# Patient Record
Sex: FEMALE | Race: WHITE | NOT HISPANIC OR LATINO | URBAN - METROPOLITAN AREA
[De-identification: names, ages, dates, MRNs, and addresses within clinical notes are randomized per-mention and may not be internally consistent; named-entity substitution may affect disease eponyms.]

---

## 2019-05-11 ENCOUNTER — EMERGENCY (EMERGENCY)
Facility: HOSPITAL | Age: 84
LOS: 1 days | Discharge: HOME | End: 2019-05-11
Attending: EMERGENCY MEDICINE | Admitting: EMERGENCY MEDICINE
Payer: MEDICARE

## 2019-05-11 VITALS
TEMPERATURE: 98 F | OXYGEN SATURATION: 98 % | SYSTOLIC BLOOD PRESSURE: 126 MMHG | RESPIRATION RATE: 16 BRPM | DIASTOLIC BLOOD PRESSURE: 70 MMHG | HEART RATE: 78 BPM

## 2019-05-11 LAB
ALBUMIN SERPL ELPH-MCNC: 3.3 G/DL — SIGNIFICANT CHANGE UP (ref 3.3–5)
ALP SERPL-CCNC: 54 U/L — SIGNIFICANT CHANGE UP (ref 40–120)
ALT FLD-CCNC: 22 U/L — SIGNIFICANT CHANGE UP (ref 12–78)
ANION GAP SERPL CALC-SCNC: 7 MMOL/L — SIGNIFICANT CHANGE UP (ref 5–17)
APPEARANCE UR: ABNORMAL
APTT BLD: 26.1 SEC — LOW (ref 27.5–36.3)
AST SERPL-CCNC: 19 U/L — SIGNIFICANT CHANGE UP (ref 15–37)
BACTERIA # UR AUTO: ABNORMAL
BASOPHILS # BLD AUTO: 0.03 K/UL — SIGNIFICANT CHANGE UP (ref 0–0.2)
BASOPHILS NFR BLD AUTO: 0.2 % — SIGNIFICANT CHANGE UP (ref 0–2)
BILIRUB SERPL-MCNC: 0.1 MG/DL — LOW (ref 0.2–1.2)
BILIRUB UR-MCNC: NEGATIVE — SIGNIFICANT CHANGE UP
BUN SERPL-MCNC: 28 MG/DL — HIGH (ref 7–23)
CALCIUM SERPL-MCNC: 8.7 MG/DL — SIGNIFICANT CHANGE UP (ref 8.5–10.1)
CHLORIDE SERPL-SCNC: 107 MMOL/L — SIGNIFICANT CHANGE UP (ref 96–108)
CK SERPL-CCNC: 87 U/L — SIGNIFICANT CHANGE UP (ref 26–192)
CO2 SERPL-SCNC: 29 MMOL/L — SIGNIFICANT CHANGE UP (ref 22–31)
COLOR SPEC: YELLOW — SIGNIFICANT CHANGE UP
CREAT SERPL-MCNC: 1.1 MG/DL — SIGNIFICANT CHANGE UP (ref 0.5–1.3)
DIFF PNL FLD: ABNORMAL
EOSINOPHIL # BLD AUTO: 0.73 K/UL — HIGH (ref 0–0.5)
EOSINOPHIL NFR BLD AUTO: 4.5 % — SIGNIFICANT CHANGE UP (ref 0–6)
EPI CELLS # UR: ABNORMAL
GLUCOSE SERPL-MCNC: 122 MG/DL — HIGH (ref 70–99)
GLUCOSE UR QL: NEGATIVE — SIGNIFICANT CHANGE UP
HCT VFR BLD CALC: 35.1 % — SIGNIFICANT CHANGE UP (ref 34.5–45)
HGB BLD-MCNC: 11.4 G/DL — LOW (ref 11.5–15.5)
IMM GRANULOCYTES NFR BLD AUTO: 0.5 % — SIGNIFICANT CHANGE UP (ref 0–1.5)
INR BLD: 1.09 RATIO — SIGNIFICANT CHANGE UP (ref 0.88–1.16)
KETONES UR-MCNC: ABNORMAL
LEUKOCYTE ESTERASE UR-ACNC: NEGATIVE — SIGNIFICANT CHANGE UP
LYMPHOCYTES # BLD AUTO: 15.1 % — SIGNIFICANT CHANGE UP (ref 13–44)
LYMPHOCYTES # BLD AUTO: 2.43 K/UL — SIGNIFICANT CHANGE UP (ref 1–3.3)
MCHC RBC-ENTMCNC: 30 PG — SIGNIFICANT CHANGE UP (ref 27–34)
MCHC RBC-ENTMCNC: 32.5 GM/DL — SIGNIFICANT CHANGE UP (ref 32–36)
MCV RBC AUTO: 92.4 FL — SIGNIFICANT CHANGE UP (ref 80–100)
MONOCYTES # BLD AUTO: 1.03 K/UL — HIGH (ref 0–0.9)
MONOCYTES NFR BLD AUTO: 6.4 % — SIGNIFICANT CHANGE UP (ref 2–14)
NEUTROPHILS # BLD AUTO: 11.81 K/UL — HIGH (ref 1.8–7.4)
NEUTROPHILS NFR BLD AUTO: 73.3 % — SIGNIFICANT CHANGE UP (ref 43–77)
NITRITE UR-MCNC: NEGATIVE — SIGNIFICANT CHANGE UP
NRBC # BLD: 0 /100 WBCS — SIGNIFICANT CHANGE UP (ref 0–0)
PH UR: 5 — SIGNIFICANT CHANGE UP (ref 5–8)
PLATELET # BLD AUTO: 324 K/UL — SIGNIFICANT CHANGE UP (ref 150–400)
POTASSIUM SERPL-MCNC: 4 MMOL/L — SIGNIFICANT CHANGE UP (ref 3.5–5.3)
POTASSIUM SERPL-SCNC: 4 MMOL/L — SIGNIFICANT CHANGE UP (ref 3.5–5.3)
PROT SERPL-MCNC: 7.4 G/DL — SIGNIFICANT CHANGE UP (ref 6–8.3)
PROT UR-MCNC: 150 MG/DL
PROTHROM AB SERPL-ACNC: 12.4 SEC — SIGNIFICANT CHANGE UP (ref 10–12.9)
RBC # BLD: 3.8 M/UL — SIGNIFICANT CHANGE UP (ref 3.8–5.2)
RBC # FLD: 14.2 % — SIGNIFICANT CHANGE UP (ref 10.3–14.5)
RBC CASTS # UR COMP ASSIST: ABNORMAL /HPF (ref 0–4)
SODIUM SERPL-SCNC: 143 MMOL/L — SIGNIFICANT CHANGE UP (ref 135–145)
SP GR SPEC: 1.02 — SIGNIFICANT CHANGE UP (ref 1.01–1.02)
UROBILINOGEN FLD QL: NEGATIVE — SIGNIFICANT CHANGE UP
WBC # BLD: 16.11 K/UL — HIGH (ref 3.8–10.5)
WBC # FLD AUTO: 16.11 K/UL — HIGH (ref 3.8–10.5)
WBC UR QL: NEGATIVE — SIGNIFICANT CHANGE UP

## 2019-05-11 PROCEDURE — 85610 PROTHROMBIN TIME: CPT

## 2019-05-11 PROCEDURE — 73030 X-RAY EXAM OF SHOULDER: CPT | Mod: 26,RT

## 2019-05-11 PROCEDURE — 99284 EMERGENCY DEPT VISIT MOD MDM: CPT

## 2019-05-11 PROCEDURE — 99284 EMERGENCY DEPT VISIT MOD MDM: CPT | Mod: 25

## 2019-05-11 PROCEDURE — 70450 CT HEAD/BRAIN W/O DYE: CPT

## 2019-05-11 PROCEDURE — 73562 X-RAY EXAM OF KNEE 3: CPT

## 2019-05-11 PROCEDURE — 72125 CT NECK SPINE W/O DYE: CPT

## 2019-05-11 PROCEDURE — 85730 THROMBOPLASTIN TIME PARTIAL: CPT

## 2019-05-11 PROCEDURE — 72125 CT NECK SPINE W/O DYE: CPT | Mod: 26

## 2019-05-11 PROCEDURE — 73030 X-RAY EXAM OF SHOULDER: CPT

## 2019-05-11 PROCEDURE — 70450 CT HEAD/BRAIN W/O DYE: CPT | Mod: 26

## 2019-05-11 PROCEDURE — 82550 ASSAY OF CK (CPK): CPT

## 2019-05-11 PROCEDURE — 71045 X-RAY EXAM CHEST 1 VIEW: CPT

## 2019-05-11 PROCEDURE — 85027 COMPLETE CBC AUTOMATED: CPT

## 2019-05-11 PROCEDURE — 71045 X-RAY EXAM CHEST 1 VIEW: CPT | Mod: 26

## 2019-05-11 PROCEDURE — 87086 URINE CULTURE/COLONY COUNT: CPT

## 2019-05-11 PROCEDURE — 80053 COMPREHEN METABOLIC PANEL: CPT

## 2019-05-11 PROCEDURE — 73502 X-RAY EXAM HIP UNI 2-3 VIEWS: CPT

## 2019-05-11 PROCEDURE — 36415 COLL VENOUS BLD VENIPUNCTURE: CPT

## 2019-05-11 PROCEDURE — 90714 TD VACC NO PRESV 7 YRS+ IM: CPT

## 2019-05-11 PROCEDURE — 73562 X-RAY EXAM OF KNEE 3: CPT | Mod: 26,RT

## 2019-05-11 PROCEDURE — 81001 URINALYSIS AUTO W/SCOPE: CPT

## 2019-05-11 PROCEDURE — 73502 X-RAY EXAM HIP UNI 2-3 VIEWS: CPT | Mod: 26,RT

## 2019-05-11 RX ORDER — SODIUM CHLORIDE 9 MG/ML
3 INJECTION INTRAMUSCULAR; INTRAVENOUS; SUBCUTANEOUS ONCE
Refills: 0 | Status: COMPLETED | OUTPATIENT
Start: 2019-05-11 | End: 2019-05-11

## 2019-05-11 RX ORDER — TETANUS AND DIPHTHERIA TOXOIDS ADSORBED 2; 2 [LF]/.5ML; [LF]/.5ML
0.5 INJECTION INTRAMUSCULAR ONCE
Refills: 0 | Status: COMPLETED | OUTPATIENT
Start: 2019-05-11 | End: 2019-05-11

## 2019-05-11 RX ADMIN — TETANUS AND DIPHTHERIA TOXOIDS ADSORBED 0.5 MILLILITER(S): 2; 2 INJECTION INTRAMUSCULAR at 20:28

## 2019-05-11 RX ADMIN — SODIUM CHLORIDE 3 MILLILITER(S): 9 INJECTION INTRAMUSCULAR; INTRAVENOUS; SUBCUTANEOUS at 19:27

## 2019-05-11 NOTE — ED PROVIDER NOTE - ENMT, MLM
Airway patent, Nasal mucosa clear. Mouth with normal mucosa. Throat has no vesicles, no oropharyngeal exudates and uvula is midline. R parietal contusion with superficial skin tear, no open laceration. NO active bleeding at this time.

## 2019-05-11 NOTE — ED ADULT TRIAGE NOTE - TEMPERATURE IN CELSIUS (DEGREES C)
36.4 Have Your Skin Lesions Been Treated?: not been treated Is This A New Presentation, Or A Follow-Up?: Follow Up Skin Lesions How Severe Is Your Skin Lesion?: moderate

## 2019-05-11 NOTE — ED ADULT NURSE NOTE - NS ED NURSE LEVEL OF CONSCIOUSNESS ORIENTATION
pt with fever since last night. tylenol 4am. tmax 103. denies cold symptoms. pt crying throughout vitals
Oriented - self; Oriented - place; Oriented - time

## 2019-05-11 NOTE — ED PROVIDER NOTE - CARE PROVIDER_API CALL
Jordy Washington)  Internal Medicine  [address] [place] [id number]  Phone: [phone]  Fax: [phone]  Follow Up Time:

## 2019-05-11 NOTE — ED PROVIDER NOTE - PROGRESS NOTE DETAILS
Pt doing well, is at basline. No acute findings. no acute signs of infxn, noted elev WBC. Will sent back to SNF. Pt doing well, is at basline. No acute findings. no acute signs of infxn, noted elev WBC. Will sent back to SNF. Pt able to walk in ed at her usual baseline

## 2019-05-11 NOTE — ED ADULT NURSE NOTE - PMH
Anemia    Cognitive deficits    Constipation    Depression    HLD (hyperlipidemia)    HTN (hypertension)    TIA (transient ischemic attack)    Weakness

## 2019-05-11 NOTE — ED PROVIDER NOTE - MUSCULOSKELETAL, MLM
Spine appears normal, no spinal tend (c,t,l). range of motion is not limited, no muscle or joint tenderness

## 2019-05-11 NOTE — ED PROVIDER NOTE - OBJECTIVE STATEMENT
89 yo F p/w was walking in bathroom and lost her footing. Pt fell and hit head. no loc. Pt co small lac to scalp, mild R shoulder pain and mild R hip pain. Pt was on ground for ~ 4 hrs prior to EMS arrival. No cp/sob/palp. no weakness. no dizzyness. NO neck / back pain. NO recent illness. no agg/allev factors. NO other inj or co.

## 2019-05-11 NOTE — ED PROVIDER NOTE - CARE PLAN
Principal Discharge DX:	Fall, initial encounter  Secondary Diagnosis:	Head injuries, initial encounter  Secondary Diagnosis:	Abrasion

## 2019-05-11 NOTE — ED ADULT NURSE NOTE - CHIEF COMPLAINT QUOTE
pt s/p fall, head injury, unwitnessed, pt poor historian, pr on floor for approx 4 hours, hs of dementia, c/o mild dizziness

## 2019-05-11 NOTE — ED ADULT NURSE NOTE - OBJECTIVE STATEMENT
88 yo F p/w was walking in bathroom and lost her footing. Pt states she hit her head on the floor has small lac to scalp, R shoulder pain and R hip pain pt states she was on the floor for appropx 3 hours pt has no other complaints

## 2019-05-11 NOTE — ED ADULT NURSE REASSESSMENT NOTE - NS ED NURSE REASSESS COMMENT FT1
Received patient awake and alert from previous shift RN, Introduced self to patient. Patient presenting with S/P fall, currently in CT scan. Pending Tetanus at this time. Blood work sent by previous RN, safety and comfort maintained, will continue to monitor.

## 2019-05-11 NOTE — ED PROVIDER NOTE - NSFOLLOWUPINSTRUCTIONS_ED_ALL_ED_FT
1) Follow-up with your Primary Medical Doctor. Call today / next business day for prompt follow-up.  2) Return to Emergency room for any worsening or persistent pain, weakness, fever, or any other concerning symptoms.  3) See attached instruction sheets for additional information, including information regarding signs and symptoms to look out for, reasons to seek immediate care and other important instructions.  4) Tylenol as needed  5) Bacitracin to scalp wound twice daily

## 2019-05-12 VITALS
DIASTOLIC BLOOD PRESSURE: 85 MMHG | TEMPERATURE: 98 F | OXYGEN SATURATION: 95 % | HEART RATE: 69 BPM | RESPIRATION RATE: 16 BRPM | SYSTOLIC BLOOD PRESSURE: 145 MMHG

## 2019-05-13 LAB
CULTURE RESULTS: SIGNIFICANT CHANGE UP
SPECIMEN SOURCE: SIGNIFICANT CHANGE UP

## 2020-11-27 ENCOUNTER — INPATIENT (INPATIENT)
Facility: HOSPITAL | Age: 85
LOS: 4 days | Discharge: NURSING, REHAB OR LONG-TERM CARE | DRG: 689 | End: 2020-12-02
Attending: INTERNAL MEDICINE | Admitting: INTERNAL MEDICINE
Payer: MEDICARE

## 2020-11-27 VITALS
HEIGHT: 55 IN | TEMPERATURE: 98 F | HEART RATE: 77 BPM | WEIGHT: 110.01 LBS | SYSTOLIC BLOOD PRESSURE: 161 MMHG | DIASTOLIC BLOOD PRESSURE: 81 MMHG | OXYGEN SATURATION: 98 % | RESPIRATION RATE: 16 BRPM

## 2020-11-27 DIAGNOSIS — R53.1 WEAKNESS: ICD-10-CM

## 2020-11-27 DIAGNOSIS — N39.0 URINARY TRACT INFECTION, SITE NOT SPECIFIED: ICD-10-CM

## 2020-11-27 DIAGNOSIS — R93.0 ABNORMAL FINDINGS ON DIAGNOSTIC IMAGING OF SKULL AND HEAD, NOT ELSEWHERE CLASSIFIED: ICD-10-CM

## 2020-11-27 DIAGNOSIS — R41.82 ALTERED MENTAL STATUS, UNSPECIFIED: ICD-10-CM

## 2020-11-27 DIAGNOSIS — K59.00 CONSTIPATION, UNSPECIFIED: ICD-10-CM

## 2020-11-27 DIAGNOSIS — F32.9 MAJOR DEPRESSIVE DISORDER, SINGLE EPISODE, UNSPECIFIED: ICD-10-CM

## 2020-11-27 DIAGNOSIS — Z29.9 ENCOUNTER FOR PROPHYLACTIC MEASURES, UNSPECIFIED: ICD-10-CM

## 2020-11-27 DIAGNOSIS — I10 ESSENTIAL (PRIMARY) HYPERTENSION: ICD-10-CM

## 2020-11-27 PROBLEM — D64.9 ANEMIA, UNSPECIFIED: Chronic | Status: ACTIVE | Noted: 2019-05-11

## 2020-11-27 PROBLEM — R41.89 OTHER SYMPTOMS AND SIGNS INVOLVING COGNITIVE FUNCTIONS AND AWARENESS: Chronic | Status: ACTIVE | Noted: 2019-05-11

## 2020-11-27 PROBLEM — G45.9 TRANSIENT CEREBRAL ISCHEMIC ATTACK, UNSPECIFIED: Chronic | Status: ACTIVE | Noted: 2019-05-11

## 2020-11-27 PROBLEM — E78.5 HYPERLIPIDEMIA, UNSPECIFIED: Chronic | Status: ACTIVE | Noted: 2019-05-11

## 2020-11-27 LAB
ALBUMIN SERPL ELPH-MCNC: 3.2 G/DL — LOW (ref 3.3–5)
ALP SERPL-CCNC: 66 U/L — SIGNIFICANT CHANGE UP (ref 40–120)
ALT FLD-CCNC: 19 U/L — SIGNIFICANT CHANGE UP (ref 12–78)
ANION GAP SERPL CALC-SCNC: 8 MMOL/L — SIGNIFICANT CHANGE UP (ref 5–17)
APPEARANCE UR: ABNORMAL
APTT BLD: 32.2 SEC — SIGNIFICANT CHANGE UP (ref 27.5–35.5)
AST SERPL-CCNC: 18 U/L — SIGNIFICANT CHANGE UP (ref 15–37)
BASOPHILS # BLD AUTO: 0.02 K/UL — SIGNIFICANT CHANGE UP (ref 0–0.2)
BASOPHILS NFR BLD AUTO: 0.2 % — SIGNIFICANT CHANGE UP (ref 0–2)
BILIRUB SERPL-MCNC: 0.4 MG/DL — SIGNIFICANT CHANGE UP (ref 0.2–1.2)
BILIRUB UR-MCNC: NEGATIVE — SIGNIFICANT CHANGE UP
BUN SERPL-MCNC: 17 MG/DL — SIGNIFICANT CHANGE UP (ref 7–23)
CALCIUM SERPL-MCNC: 8.8 MG/DL — SIGNIFICANT CHANGE UP (ref 8.5–10.1)
CHLORIDE SERPL-SCNC: 106 MMOL/L — SIGNIFICANT CHANGE UP (ref 96–108)
CO2 SERPL-SCNC: 28 MMOL/L — SIGNIFICANT CHANGE UP (ref 22–31)
COLOR SPEC: YELLOW — SIGNIFICANT CHANGE UP
CREAT SERPL-MCNC: 1 MG/DL — SIGNIFICANT CHANGE UP (ref 0.5–1.3)
DIFF PNL FLD: ABNORMAL
EOSINOPHIL # BLD AUTO: 0.36 K/UL — SIGNIFICANT CHANGE UP (ref 0–0.5)
EOSINOPHIL NFR BLD AUTO: 2.8 % — SIGNIFICANT CHANGE UP (ref 0–6)
GLUCOSE SERPL-MCNC: 97 MG/DL — SIGNIFICANT CHANGE UP (ref 70–99)
GLUCOSE UR QL: NEGATIVE — SIGNIFICANT CHANGE UP
HCT VFR BLD CALC: 40.6 % — SIGNIFICANT CHANGE UP (ref 34.5–45)
HGB BLD-MCNC: 13.7 G/DL — SIGNIFICANT CHANGE UP (ref 11.5–15.5)
IMM GRANULOCYTES NFR BLD AUTO: 0.5 % — SIGNIFICANT CHANGE UP (ref 0–1.5)
INR BLD: 1.14 RATIO — SIGNIFICANT CHANGE UP (ref 0.88–1.16)
KETONES UR-MCNC: NEGATIVE — SIGNIFICANT CHANGE UP
LACTATE SERPL-SCNC: 1.1 MMOL/L — SIGNIFICANT CHANGE UP (ref 0.7–2)
LEUKOCYTE ESTERASE UR-ACNC: ABNORMAL
LYMPHOCYTES # BLD AUTO: 1.69 K/UL — SIGNIFICANT CHANGE UP (ref 1–3.3)
LYMPHOCYTES # BLD AUTO: 13 % — SIGNIFICANT CHANGE UP (ref 13–44)
MCHC RBC-ENTMCNC: 30.6 PG — SIGNIFICANT CHANGE UP (ref 27–34)
MCHC RBC-ENTMCNC: 33.7 GM/DL — SIGNIFICANT CHANGE UP (ref 32–36)
MCV RBC AUTO: 90.6 FL — SIGNIFICANT CHANGE UP (ref 80–100)
MONOCYTES # BLD AUTO: 0.64 K/UL — SIGNIFICANT CHANGE UP (ref 0–0.9)
MONOCYTES NFR BLD AUTO: 4.9 % — SIGNIFICANT CHANGE UP (ref 2–14)
NEUTROPHILS # BLD AUTO: 10.24 K/UL — HIGH (ref 1.8–7.4)
NEUTROPHILS NFR BLD AUTO: 78.6 % — HIGH (ref 43–77)
NITRITE UR-MCNC: POSITIVE
NRBC # BLD: 0 /100 WBCS — SIGNIFICANT CHANGE UP (ref 0–0)
PH UR: 6 — SIGNIFICANT CHANGE UP (ref 5–8)
PLATELET # BLD AUTO: 365 K/UL — SIGNIFICANT CHANGE UP (ref 150–400)
POTASSIUM SERPL-MCNC: 3.8 MMOL/L — SIGNIFICANT CHANGE UP (ref 3.5–5.3)
POTASSIUM SERPL-SCNC: 3.8 MMOL/L — SIGNIFICANT CHANGE UP (ref 3.5–5.3)
PROT SERPL-MCNC: 8 G/DL — SIGNIFICANT CHANGE UP (ref 6–8.3)
PROT UR-MCNC: 15
PROTHROM AB SERPL-ACNC: 13.3 SEC — SIGNIFICANT CHANGE UP (ref 10.6–13.6)
RBC # BLD: 4.48 M/UL — SIGNIFICANT CHANGE UP (ref 3.8–5.2)
RBC # FLD: 13.5 % — SIGNIFICANT CHANGE UP (ref 10.3–14.5)
SARS-COV-2 RNA SPEC QL NAA+PROBE: SIGNIFICANT CHANGE UP
SODIUM SERPL-SCNC: 142 MMOL/L — SIGNIFICANT CHANGE UP (ref 135–145)
SP GR SPEC: 1.01 — SIGNIFICANT CHANGE UP (ref 1.01–1.02)
TROPONIN I SERPL-MCNC: <.015 NG/ML — SIGNIFICANT CHANGE UP (ref 0.01–0.04)
UROBILINOGEN FLD QL: NEGATIVE — SIGNIFICANT CHANGE UP
WBC # BLD: 13.02 K/UL — HIGH (ref 3.8–10.5)
WBC # FLD AUTO: 13.02 K/UL — HIGH (ref 3.8–10.5)

## 2020-11-27 PROCEDURE — 99284 EMERGENCY DEPT VISIT MOD MDM: CPT

## 2020-11-27 PROCEDURE — 71045 X-RAY EXAM CHEST 1 VIEW: CPT | Mod: 26

## 2020-11-27 PROCEDURE — 70450 CT HEAD/BRAIN W/O DYE: CPT | Mod: 26

## 2020-11-27 RX ORDER — HEPARIN SODIUM 5000 [USP'U]/ML
5000 INJECTION INTRAVENOUS; SUBCUTANEOUS EVERY 12 HOURS
Refills: 0 | Status: DISCONTINUED | OUTPATIENT
Start: 2020-11-27 | End: 2020-12-02

## 2020-11-27 RX ORDER — ASPIRIN/CALCIUM CARB/MAGNESIUM 324 MG
81 TABLET ORAL DAILY
Refills: 0 | Status: DISCONTINUED | OUTPATIENT
Start: 2020-11-27 | End: 2020-12-02

## 2020-11-27 RX ORDER — AMLODIPINE BESYLATE 2.5 MG/1
5 TABLET ORAL DAILY
Refills: 0 | Status: DISCONTINUED | OUTPATIENT
Start: 2020-11-27 | End: 2020-12-02

## 2020-11-27 RX ORDER — ACETAMINOPHEN 500 MG
650 TABLET ORAL EVERY 6 HOURS
Refills: 0 | Status: DISCONTINUED | OUTPATIENT
Start: 2020-11-27 | End: 2020-12-02

## 2020-11-27 RX ORDER — LANOLIN/MINERAL OIL
1 LOTION (ML) TOPICAL
Qty: 0 | Refills: 0 | DISCHARGE

## 2020-11-27 RX ORDER — LANOLIN ALCOHOL/MO/W.PET/CERES
3 CREAM (GRAM) TOPICAL AT BEDTIME
Refills: 0 | Status: DISCONTINUED | OUTPATIENT
Start: 2020-11-27 | End: 2020-12-02

## 2020-11-27 RX ORDER — PANTOPRAZOLE SODIUM 20 MG/1
40 TABLET, DELAYED RELEASE ORAL
Refills: 0 | Status: DISCONTINUED | OUTPATIENT
Start: 2020-11-27 | End: 2020-12-02

## 2020-11-27 RX ORDER — FERROUS SULFATE 325(65) MG
1 TABLET ORAL
Qty: 0 | Refills: 0 | DISCHARGE

## 2020-11-27 RX ORDER — CEFTRIAXONE 500 MG/1
1000 INJECTION, POWDER, FOR SOLUTION INTRAMUSCULAR; INTRAVENOUS EVERY 24 HOURS
Refills: 0 | Status: DISCONTINUED | OUTPATIENT
Start: 2020-11-28 | End: 2020-11-29

## 2020-11-27 RX ORDER — SODIUM CHLORIDE 9 MG/ML
1000 INJECTION INTRAMUSCULAR; INTRAVENOUS; SUBCUTANEOUS ONCE
Refills: 0 | Status: COMPLETED | OUTPATIENT
Start: 2020-11-27 | End: 2020-11-27

## 2020-11-27 RX ORDER — CEFTRIAXONE 500 MG/1
1000 INJECTION, POWDER, FOR SOLUTION INTRAMUSCULAR; INTRAVENOUS ONCE
Refills: 0 | Status: COMPLETED | OUTPATIENT
Start: 2020-11-27 | End: 2020-11-27

## 2020-11-27 RX ORDER — ALBUTEROL 90 UG/1
2 AEROSOL, METERED ORAL
Qty: 0 | Refills: 0 | DISCHARGE

## 2020-11-27 RX ORDER — CHOLECALCIFEROL (VITAMIN D3) 125 MCG
0 CAPSULE ORAL
Qty: 0 | Refills: 0 | DISCHARGE

## 2020-11-27 RX ORDER — HYDRALAZINE HCL 50 MG
10 TABLET ORAL
Refills: 0 | Status: DISCONTINUED | OUTPATIENT
Start: 2020-11-27 | End: 2020-12-02

## 2020-11-27 RX ORDER — MAGNESIUM HYDROXIDE 400 MG/1
30 TABLET, CHEWABLE ORAL DAILY
Refills: 0 | Status: DISCONTINUED | OUTPATIENT
Start: 2020-11-27 | End: 2020-12-02

## 2020-11-27 RX ORDER — ESCITALOPRAM OXALATE 10 MG/1
10 TABLET, FILM COATED ORAL DAILY
Refills: 0 | Status: DISCONTINUED | OUTPATIENT
Start: 2020-11-27 | End: 2020-12-02

## 2020-11-27 RX ORDER — SODIUM CHLORIDE 9 MG/ML
1000 INJECTION, SOLUTION INTRAVENOUS
Refills: 0 | Status: DISCONTINUED | OUTPATIENT
Start: 2020-11-27 | End: 2020-12-01

## 2020-11-27 RX ORDER — ACETAMINOPHEN 500 MG
2 TABLET ORAL
Qty: 0 | Refills: 0 | DISCHARGE

## 2020-11-27 RX ORDER — POLYETHYLENE GLYCOL 3350 17 G/17G
17 POWDER, FOR SOLUTION ORAL DAILY
Refills: 0 | Status: DISCONTINUED | OUTPATIENT
Start: 2020-11-27 | End: 2020-12-02

## 2020-11-27 RX ORDER — CHOLECALCIFEROL (VITAMIN D3) 125 MCG
1000 CAPSULE ORAL DAILY
Refills: 0 | Status: DISCONTINUED | OUTPATIENT
Start: 2020-11-27 | End: 2020-12-02

## 2020-11-27 RX ADMIN — Medication 10 MILLIGRAM(S): at 18:33

## 2020-11-27 RX ADMIN — CEFTRIAXONE 100 MILLIGRAM(S): 500 INJECTION, POWDER, FOR SOLUTION INTRAMUSCULAR; INTRAVENOUS at 13:48

## 2020-11-27 RX ADMIN — SODIUM CHLORIDE 50 MILLILITER(S): 9 INJECTION, SOLUTION INTRAVENOUS at 22:38

## 2020-11-27 RX ADMIN — SODIUM CHLORIDE 1000 MILLILITER(S): 9 INJECTION INTRAMUSCULAR; INTRAVENOUS; SUBCUTANEOUS at 12:45

## 2020-11-27 NOTE — ED ADULT TRIAGE NOTE - CHIEF COMPLAINT QUOTE
Pt BIBA from [facility] for possible CVA- staff from NH last known well 800- staff said that new onset of right sided weakness -state pt is normally talkative -now making grunts- EMS state patient was speaking on ambulance - pt is speaking here -

## 2020-11-27 NOTE — H&P ADULT - NSHPLABSRESULTS_GEN_ALL_CORE
< from: Xray Chest 1 View-PORTABLE IMMEDIATE (Xray Chest 1 View-PORTABLE IMMEDIATE .) (11.27.20 @ 13:07) >    INTERPRETATION:  DATE OF STUDY: 11/27/2020    PRIOR: 5/11/19    CLINICAL INDICATION: AMS. Lethargy. Assess for chest process.    TECHNIQUE: portable chest.    FINDINGS:  Thoracic aortic atheromatous changes and ectasia are stable.  The cardiomediastinal silhouette is within normal limits.  Pulmonary vascularity appears normal.  No focal consolidations. No pleural effusion or pneumothorax.  No acutebony finding.  Redemonstration of right breast calcifications.    IMPRESSION:  1. Stable chest.  2. Negative for active pulmonary disease.    < end of copied text >    < from: CT Head No Cont (11.27.20 @ 15:29) >    EXAM:  CT BRAIN                            PROCEDURE DATE:  11/27/2020          INTERPRETATION:  CLINICAL INDICATION: Mental status change    Technique: Noncontrast CT of the head was performed.    Multiple contiguous axial images were acquired from the skull base to the vertex without the administration of intravenous contrast. Coronal and sagittal reformations were made.    COMPARISON: Head CT, 5/11/2019    FINDINGS:  Redemonstration moderate enlargement of ventricles and sulci consistent with volume loss. Is demonstration of nonspecific white matter decreased attenuation, likely microvascular disease with age indeterminate lacunar infarction at the left ventral medial thalamus new from prior (2:14). There is atherosclerotic vascular calcification along the carotid siphons. There is slight motion, as well as streak artifact with artificial increased attenuation along the right lateral frontal lobe. There is no new intraparenchymal hematoma, mass effect or midline shift. No new abnormal extra-axial fluid collections are present. The basal cisterns remain patent.    Intact calvarium, absent lenses with cataract surgery, interval new right anterior ostiomeatal complex and right frontal recess obstruction with new right maxillary, right frontal and right anterior ethmoidal air cell complete opacification and mucoceles with bony hyperostosis, mass such as polyp or inverting papilloma not excluded, new mucosal thickening along the right posterior ethmoid sinus left frontal and sphenoid sinuses. Suggest dedicated ENT assessment. Mastoid cavities remain unremarkable. Calcific densities  left frontal and right parietal scalp likely represent calcified sebaceous cysts.    IMPRESSION:    Volume loss, microvascular disease, age indeterminate lacunar infarct including  left ventral medial thalamus, MR sensitive for new ischemia. No acute hemorrhage or midline shift. Right ostiomeatal complex obstruction with right frontal, ethmoid and maxillary mucoceles right maxillary bony hyperostosis,, mass effect as polyp or inverting papilloma not excluded, suggest dedicated ENT assessment. If symptoms persist consider follow-up head CT or MR if no contraindications.    < end of copied text >

## 2020-11-27 NOTE — H&P ADULT - PROBLEM SELECTOR PLAN 1
LIKELY 2/2 TO ACUTE METABOLIC ENCEPHALOPATHY SECONDARY TO UTI ,POA .PROGRESSION /ONSET OF DEMENTIA MAY BE CONTRIBUTING FACTOR .NEUROLOGY CONS REQUESTED TO RULE OUT ACUTE CVA

## 2020-11-27 NOTE — H&P ADULT - PROBLEM SELECTOR PLAN 4
REPORT REVIEWED WITH  ,ISCHEMIC LACUNAR CHANGES ARE OF INDETERMINATE TIME OF ORIGIN .MAY REQUIRE MRI .CONTINUE BASA FOR NOW DAILY

## 2020-11-27 NOTE — H&P ADULT - HISTORY OF PRESENT ILLNESS
89 YO F ,[facility] resident was brought to ER with altered mental status and weakness ,found to have probable UTI Admitted for septic workup and evaluation,send blood and urine cx,serial lactate levels,monitor vitals closley,ivfs hydration,monitor urine output and renal profile,iv abx initiated -started on ceftriaxone in ER and ID CONS requested .CT Brain was done and showed Volume loss, microvascular disease, age indeterminate lacunar infarct including  left ventral medial thalamus, MR sensitive for new ischemia. No acute hemorrhage or midline shift. Right ostiomeatal complex obstruction with right frontal, ethmoid and maxillary mucoceles right maxillary bony hyperostosis,, mass effect as polyp or inverting papilloma not excluded,report was discussed with neurologist and no new interventions recommended ,continue home rx .Patient;s past med hx is significant for Anemia  ,Cognitive deficits  ,constipation ,Depression  ,HLD (hyperlipidemia)  ,HTN (hypertension)  ,TIA (transient ischemic attack)  .Found to have BP elevation and cardiology consult requested for comanagement Admitted  to telemetry unit for monitoring , send 3 sets of cardiac enzymes to rule out acute coronary event, obtain ECHO to evaluate LVEF, cardiology consult  ,continue current management, O2 supply, anticoagulation plan as per cardiology consult COVID PCR sent and currently is pending . Palliative care consult requested ,to discuss advance directives and complete MOLST

## 2020-11-27 NOTE — ED PROVIDER NOTE - ATTENDING CONTRIBUTION TO CARE
I have personally performed a face to face diagnostic evaluation on this patient.  I have reviewed the PA note and agree with the history, exam, and plan of care, except as noted.  History and Exam by me shows  bibems from [facility] for ams today c ? right-sided weakness. pt is in nad.  pt is responsive to verbal stimuli.  labs were significant for uti.  Admitted.

## 2020-11-27 NOTE — H&P ADULT - ATTENDING COMMENTS
89 YO F ,[facility] resident was brought to ER with altered mental status and weakness ,found to have probable UTI Admitted for septic workup and evaluation,send blood and urine cx,serial lactate levels,monitor vitals closley,ivfs hydration,monitor urine output and renal profile,iv abx initiated -started on ceftriaxone in ER and ID CONS requested .CT Brain was done and showed Volume loss, microvascular disease, age indeterminate lacunar infarct including  left ventral medial thalamus, MR sensitive for new ischemia. No acute hemorrhage or midline shift. Right ostiomeatal complex obstruction with right frontal, ethmoid and maxillary mucoceles right maxillary bony hyperostosis,, mass effect as polyp or inverting papilloma not excluded,report was discussed with neurologist and no new interventions recommended ,continue home rx .Patient;s past med hx is significant for Anemia  ,Cognitive deficits  ,constipation ,Depression  ,HLD (hyperlipidemia)  ,HTN (hypertension)  ,TIA (transient ischemic attack)  .Found to have BP elevation and cardiology consult requested for comanagement Admitted  to telemetry unit for monitoring , send 3 sets of cardiac enzymes to rule out acute coronary event, obtain ECHO to evaluate LVEF, cardiology consult  ,continue current management, O2 supply, anticoagulation plan as per cardiology consult Palliative care consult requested ,to discuss advance directives and complete MOLST

## 2020-11-27 NOTE — ED PROVIDER NOTE - CLINICAL SUMMARY MEDICAL DECISION MAKING FREE TEXT BOX
89 yo F p/w lethargy x today, decreased responsiveness as per [facility] staff--> will do full workup, CVA vs infectious etiology

## 2020-11-27 NOTE — ED ADULT NURSE NOTE - OBJECTIVE STATEMENT
patient came in ED from [facility]. as per EMS, patient noted to be less responsive since 08:00am. patient noted to be sleepy, responds with open eyes and verbally when approached. non-labored respiration noted. abdomen nondistended, nontender. afebrile. on fall precaution.

## 2020-11-27 NOTE — CONSULT NOTE ADULT - ASSESSMENT
ams  uti- on rocephin  hypertension - on norvasc and hydralazine  dyslipidemia  echo for lv function

## 2020-11-27 NOTE — ED PROVIDER NOTE - OBJECTIVE STATEMENT
89 yo F PMHx HTN, HLD, TIA, cognitive deficits presents to ED from [place] for evaluation of lethargy x today, decreased responsiveness and reported right sided weakness. Last known well 8 AM as per NH staff. Upon evaluation, pt does not provide any history. Pt is arouseable to verbal stimuli but replies with grunting or "yes" and "no" -- No further hx attainable at this time.

## 2020-11-27 NOTE — CONSULT NOTE ADULT - SUBJECTIVE AND OBJECTIVE BOX
CARDIOLOGY CONSULT NOTE    Patient is a 89y Female with a known history of : admitted with ams and uti  has had hypertension and dyslipidemia  Prophylactic measure [Z29.9]    Constipation [K59.00]    Weakness [R53.1]    Depression [F32.9]    Abnormal CT scan of head [R93.0]    HTN (hypertension) [I10]    UTI (urinary tract infection) [N39.0]    Altered mental status [R41.82]      HPI:  89 YO F ,[facility] resident was brought to ER with altered mental status and weakness ,found to have probable UTI Admitted for septic workup and evaluation,send blood and urine cx,serial lactate levels,monitor vitals closley,ivfs hydration,monitor urine output and renal profile,iv abx initiated -started on ceftriaxone in ER and ID CONS requested .CT Brain was done and showed Volume loss, microvascular disease, age indeterminate lacunar infarct including  left ventral medial thalamus, MR sensitive for new ischemia. No acute hemorrhage or midline shift. Right ostiomeatal complex obstruction with right frontal, ethmoid and maxillary mucoceles right maxillary bony hyperostosis,, mass effect as polyp or inverting papilloma not excluded,report was discussed with neurologist and no new interventions recommended ,continue home rx .Patient;s past med hx is significant for Anemia  ,Cognitive deficits  ,constipation ,Depression  ,HLD (hyperlipidemia)  ,HTN (hypertension)  ,TIA (transient ischemic attack)  .Found to have BP elevation and cardiology consult requested for comanagement Admitted  to telemetry unit for monitoring , send 3 sets of cardiac enzymes to rule out acute coronary event, obtain ECHO to evaluate LVEF, cardiology consult  ,continue current management, O2 supply, anticoagulation plan as per cardiology consult COVID PCR sent and currently is pending . Palliative care consult requested ,to discuss advance directives and complete MOLST  (27 Nov 2020 18:15)      REVIEW OF SYSTEMS:    CONSTITUTIONAL: No fever, weight loss, or fatigue  EYES: No eye pain, visual disturbances, or discharge  ENMT:  No difficulty hearing, tinnitus, vertigo; No sinus or throat pain  NECK: No pain or stiffness  BREASTS: No pain, masses, or nipple discharge  RESPIRATORY: No cough, wheezing, chills or hemoptysis; No shortness of breath  CARDIOVASCULAR: No chest pain, palpitations, dizziness, or leg swelling  GASTROINTESTINAL: No abdominal or epigastric pain. No nausea, vomiting, or hematemesis; No diarrhea or constipation. No melena or hematochezia.  GENITOURINARY: No dysuria, frequency, hematuria, or incontinence  NEUROLOGICAL: No headaches, memory loss, loss of strength, numbness, or tremors  SKIN: No itching, burning, rashes, or lesions   LYMPH NODES: No enlarged glands  ENDOCRINE: No heat or cold intolerance; No hair loss  MUSCULOSKELETAL: No joint pain or swelling; No muscle, back, or extremity pain  PSYCHIATRIC: No depression, anxiety, mood swings, or difficulty sleeping  HEME/LYMPH: No easy bruising, or bleeding gums  ALLERGY AND IMMUNOLOGIC: No hives or eczema    MEDICATIONS  (STANDING):  amLODIPine   Tablet 5 milliGRAM(s) Oral daily  aspirin enteric coated 81 milliGRAM(s) Oral daily  cholecalciferol 1000 Unit(s) Oral daily  dextrose 5% + sodium chloride 0.45%. 1000 milliLiter(s) (50 mL/Hr) IV Continuous <Continuous>  escitalopram 10 milliGRAM(s) Oral daily  heparin   Injectable 5000 Unit(s) SubCutaneous every 12 hours  pantoprazole    Tablet 40 milliGRAM(s) Oral before breakfast  polyethylene glycol 3350 17 Gram(s) Oral daily    MEDICATIONS  (PRN):  acetaminophen   Tablet .. 650 milliGRAM(s) Oral every 6 hours PRN Temp greater or equal to 38C (100.4F), Mild Pain (1 - 3)  hydrALAZINE 10 milliGRAM(s) Oral four times a day PRN Systolic blood pressure >180  magnesium hydroxide Suspension 30 milliLiter(s) Oral daily PRN Constipation  melatonin 3 milliGRAM(s) Oral at bedtime PRN Insomnia      ALLERGIES: No Known Allergies      Social History:     FAMILY HISTORY:      PAST MEDICAL & SURGICAL HISTORY:  Constipation    HLD (hyperlipidemia)    Depression    HTN (hypertension)    Cognitive deficits    Weakness    Anemia    TIA (transient ischemic attack)          PHYSICAL EXAMINATION:  -----------------------------  T(C): 36.8 (11-27-20 @ 19:10), Max: 36.9 (11-27-20 @ 12:41)  HR: 81 (11-27-20 @ 19:10) (66 - 81)  BP: 147/86 (11-27-20 @ 19:10) (147/86 - 195/86)  RR: 16 (11-27-20 @ 19:10) (16 - 16)  SpO2: 97% (11-27-20 @ 19:10) (94% - 98%)  Wt(kg): --    Height (cm): 139.7 (11-27 @ 11:52)  Weight (kg): 49.9 (11-27 @ 11:52)  BMI (kg/m2): 25.6 (11-27 @ 11:52)  BSA (m2): 1.36 (11-27 @ 11:52)    Constitutional: well developed, normal appearance, well groomed, well nourished, no deformities and no acute distress.   Eyes: the conjunctiva exhibited no abnormalities and the eyelids demonstrated no xanthelasmas.   HEENT: normal oral mucosa, no oral pallor and no oral cyanosis.   Neck: normal jugular venous A waves present, normal jugular venous V waves present and no jugular venous blake A waves.   Pulmonary: no respiratory distress, normal respiratory rhythm and effort, no accessory muscle use and lungs were clear to auscultation bilaterally.   Cardiovascular: heart rate and rhythm were normal, normal S1 and S2 and no murmur, gallop, rub, heave or thrill are present.   Abdomen: soft, non-tender, no hepato-splenomegaly and no abdominal mass palpated.   Musculoskeletal: the gait could not be assessed..   Extremities: no clubbing of the fingernails, no localized cyanosis, no petechial hemorrhages and no ischemic changes.   Skin: normal skin color and pigmentation, no rash, no venous stasis, no skin lesions, no skin ulcer and no xanthoma was observed.   Psychiatric: oriented to person, place, and time, the affect was normal, the mood was normal and not feeling anxious.     LABS:   --------  11-27    142  |  106  |  17  ----------------------------<  97  3.8   |  28  |  1.00    Ca    8.8      27 Nov 2020 12:44    TPro  8.0  /  Alb  3.2<L>  /  TBili  0.4  /  DBili  x   /  AST  18  /  ALT  19  /  AlkPhos  66  11-27                         13.7   13.02 )-----------( 365      ( 27 Nov 2020 12:44 )             40.6     PT/INR - ( 27 Nov 2020 12:44 )   PT: 13.3 sec;   INR: 1.14 ratio         PTT - ( 27 Nov 2020 12:44 )  PTT:32.2 sec    11-27 @ 12:44 CPK total:--, CKMB --, Troponin I - <.015 ng/mL          RADIOLOGY:  -----------------        ECG:     ECHO

## 2020-11-27 NOTE — CONSULT NOTE ADULT - SUBJECTIVE AND OBJECTIVE BOX
Patient is a 89y old  Female who presents with a chief complaint of ALTERED MENTAL STATUS (27 Nov 2020 19:52)      HPI:  89 YO F ,[place] [facility] resident was brought to ER with altered mental status and weakness ,found to have probable UTI Admitted for septic workup and evaluation,send blood and urine cx,serial lactate levels,monitor vitals closley,ivfs hydration,monitor urine output and renal profile,iv abx initiated -started on ceftriaxone in ER and ID CONS requested .CT Brain was done and showed Volume loss, microvascular disease, age indeterminate lacunar infarct including  left ventral medial thalamus, MR sensitive for new ischemia. No acute hemorrhage or midline shift. Right ostiomeatal complex obstruction with right frontal, ethmoid and maxillary mucoceles right maxillary bony hyperostosis,, mass effect as polyp or inverting papilloma not excluded,report was discussed with neurologist and no new interventions recommended ,continue home rx .Patient;s past med hx is significant for Anemia  ,Cognitive deficits  ,constipation ,Depression  ,HLD (hyperlipidemia)  ,HTN (hypertension)  ,TIA (transient ischemic attack)  .Found to have BP elevation and cardiology consult requested for comanagement Admitted  to telemetry unit for monitoring , send 3 sets of cardiac enzymes to rule out acute coronary event, obtain ECHO to evaluate LVEF, cardiology consult  ,continue current management, O2 supply, anticoagulation plan as per cardiology consult COVID PCR sent and currently is pending . Palliative care consult requested ,to discuss advance directives and complete MOLST  (27 Nov 2020 18:15)      Asked to see patient for ID Consult    PAST MEDICAL & SURGICAL HISTORY:  Constipation    HLD (hyperlipidemia)    Depression    HTN (hypertension)    Cognitive deficits    Weakness    Anemia    TIA (transient ischemic attack)        Allergies    No Known Allergies    Intolerances        REVIEW OF SYSTEMS:  No vomiting or abdominal pain      HOME MEDICATIONS:    MEDICATIONS  (STANDING):  amLODIPine   Tablet 5 milliGRAM(s) Oral daily  aspirin enteric coated 81 milliGRAM(s) Oral daily  cholecalciferol 1000 Unit(s) Oral daily  dextrose 5% + sodium chloride 0.45%. 1000 milliLiter(s) (50 mL/Hr) IV Continuous <Continuous>  escitalopram 10 milliGRAM(s) Oral daily  heparin   Injectable 5000 Unit(s) SubCutaneous every 12 hours  pantoprazole    Tablet 40 milliGRAM(s) Oral before breakfast  polyethylene glycol 3350 17 Gram(s) Oral daily    MEDICATIONS  (PRN):  acetaminophen   Tablet .. 650 milliGRAM(s) Oral every 6 hours PRN Temp greater or equal to 38C (100.4F), Mild Pain (1 - 3)  hydrALAZINE 10 milliGRAM(s) Oral four times a day PRN Systolic blood pressure >180  magnesium hydroxide Suspension 30 milliLiter(s) Oral daily PRN Constipation  melatonin 3 milliGRAM(s) Oral at bedtime PRN Insomnia      Vital Signs Last 24 Hrs  T(C): 36.8 (27 Nov 2020 19:10), Max: 36.9 (27 Nov 2020 12:41)  T(F): 98.2 (27 Nov 2020 19:10), Max: 98.4 (27 Nov 2020 12:41)  HR: 81 (27 Nov 2020 19:10) (66 - 81)  BP: 147/86 (27 Nov 2020 19:10) (147/86 - 195/86)  BP(mean): --  RR: 16 (27 Nov 2020 19:10) (16 - 16)  SpO2: 97% (27 Nov 2020 19:10) (94% - 98%)    PHYSICAL EXAM:  HEENT: NC/AT  Neck: oft   Lungs: Coarse BS   Heart: RRR  Abdomen: Soft  Genital/ Rectal: No garcia   Extremities: No ulcers,   Neurologic: Confused.   Vascular:    I&O's Summary      LABORATORY:                          13.7   13.02 )-----------( 365      ( 27 Nov 2020 12:44 )             40.6       ESR:                   11-27 @ 12:44  --    C-Reactive Protein:     11-27 @ 12:44  --    Procalcitonin:           11-27 @ 12:44   <0.05      11-27    142  |  106  |  17  ----------------------------<  97  3.8   |  28  |  1.00    Ca    8.8      27 Nov 2020 12:44    TPro  8.0  /  Alb  3.2<L>  /  TBili  0.4  /  DBili  x   /  AST  18  /  ALT  19  /  AlkPhos  66  11-27      LABORATORY:    CBC Full  -  ( 27 Nov 2020 12:44 )  WBC Count : 13.02 K/uL  RBC Count : 4.48 M/uL  Hemoglobin : 13.7 g/dL  Hematocrit : 40.6 %  Platelet Count - Automated : 365 K/uL  Mean Cell Volume : 90.6 fl  Mean Cell Hemoglobin : 30.6 pg  Mean Cell Hemoglobin Concentration : 33.7 gm/dL  Auto Neutrophil # : 10.24 K/uL  Auto Lymphocyte # : 1.69 K/uL  Auto Monocyte # : 0.64 K/uL  Auto Eosinophil # : 0.36 K/uL  Auto Basophil # : 0.02 K/uL  Auto Neutrophil % : 78.6 %  Auto Lymphocyte % : 13.0 %  Auto Monocyte % : 4.9 %  Auto Eosinophil % : 2.8 %  Auto Basophil % : 0.2 %        ESR:                   11-27 @ 12:44  --    C-Reactive Protein:     11-27 @ 12:44  --    Procalcitonin:           11-27 @ 12:44   <0.05      11-27    142  |  106  |  17  ----------------------------<  97  3.8   |  28  |  1.00    Ca    8.8      27 Nov 2020 12:44    TPro  8.0  /  Alb  3.2<L>  /  TBili  0.4  /  DBili  x   /  AST  18  /  ALT  19  /  AlkPhos  66  11-27      Assessment and Plans:     1. UTI/  2. Toxic metabolic encephalopathy.  3. Weakness    / Continue IV Rocephin.  . Follow cultures.    Thank you                                              Patient is a 89y old  Female who presents with a chief complaint of ALTERED MENTAL STATUS (27 Nov 2020 19:52)      HPI:  89 YO F ,[place] [facility] resident was brought to ER with altered mental status and weakness ,found to have probable UTI Admitted for septic workup and evaluation,send blood and urine cx,serial lactate levels,monitor vitals closley,ivfs hydration,monitor urine output and renal profile,iv abx initiated -started on ceftriaxone in ER and ID CONS requested .CT Brain was done and showed Volume loss, microvascular disease, age indeterminate lacunar infarct including  left ventral medial thalamus, MR sensitive for new ischemia. No acute hemorrhage or midline shift. Right ostiomeatal complex obstruction with right frontal, ethmoid and maxillary mucoceles right maxillary bony hyperostosis,, mass effect as polyp or inverting papilloma not excluded,report was discussed with neurologist and no new interventions recommended ,continue home rx .Patient;s past med hx is significant for Anemia  ,Cognitive deficits  ,constipation ,Depression  ,HLD (hyperlipidemia)  ,HTN (hypertension)  ,TIA (transient ischemic attack)  .Found to have BP elevation and cardiology consult requested for comanagement Admitted  to telemetry unit for monitoring , send 3 sets of cardiac enzymes to rule out acute coronary event, obtain ECHO to evaluate LVEF, cardiology consult  ,continue current management, O2 supply, anticoagulation plan as per cardiology consult COVID PCR sent and currently is pending . Palliative care consult requested ,to discuss advance directives and complete MOLST  (27 Nov 2020 18:15)      Asked to see patient for ID Consult    PAST MEDICAL & SURGICAL HISTORY:  Constipation    HLD (hyperlipidemia)    Depression    HTN (hypertension)    Cognitive deficits    Weakness    Anemia    TIA (transient ischemic attack)        Allergies    No Known Allergies    Intolerances        REVIEW OF SYSTEMS:  No vomiting or abdominal pain      HOME MEDICATIONS:    MEDICATIONS  (STANDING):  amLODIPine   Tablet 5 milliGRAM(s) Oral daily  aspirin enteric coated 81 milliGRAM(s) Oral daily  cholecalciferol 1000 Unit(s) Oral daily  dextrose 5% + sodium chloride 0.45%. 1000 milliLiter(s) (50 mL/Hr) IV Continuous <Continuous>  escitalopram 10 milliGRAM(s) Oral daily  heparin   Injectable 5000 Unit(s) SubCutaneous every 12 hours  pantoprazole    Tablet 40 milliGRAM(s) Oral before breakfast  polyethylene glycol 3350 17 Gram(s) Oral daily    MEDICATIONS  (PRN):  acetaminophen   Tablet .. 650 milliGRAM(s) Oral every 6 hours PRN Temp greater or equal to 38C (100.4F), Mild Pain (1 - 3)  hydrALAZINE 10 milliGRAM(s) Oral four times a day PRN Systolic blood pressure >180  magnesium hydroxide Suspension 30 milliLiter(s) Oral daily PRN Constipation  melatonin 3 milliGRAM(s) Oral at bedtime PRN Insomnia      Vital Signs Last 24 Hrs  T(C): 36.8 (27 Nov 2020 19:10), Max: 36.9 (27 Nov 2020 12:41)  T(F): 98.2 (27 Nov 2020 19:10), Max: 98.4 (27 Nov 2020 12:41)  HR: 81 (27 Nov 2020 19:10) (66 - 81)  BP: 147/86 (27 Nov 2020 19:10) (147/86 - 195/86)  BP(mean): --  RR: 16 (27 Nov 2020 19:10) (16 - 16)  SpO2: 97% (27 Nov 2020 19:10) (94% - 98%)    PHYSICAL EXAM:  HEENT: NC/AT  Neck: oft   Lungs: Coarse BS   Heart: RRR  Abdomen: Soft  Genital/ Rectal: No garcia   Extremities: No ulcers,   Neurologic: Confused.     I&O's Summary      LABORATORY:                          13.7   13.02 )-----------( 365      ( 27 Nov 2020 12:44 )             40.6       ESR:                   11-27 @ 12:44  --    C-Reactive Protein:     11-27 @ 12:44  --    Procalcitonin:           11-27 @ 12:44   <0.05      11-27    142  |  106  |  17  ----------------------------<  97  3.8   |  28  |  1.00    Ca    8.8      27 Nov 2020 12:44    TPro  8.0  /  Alb  3.2<L>  /  TBili  0.4  /  DBili  x   /  AST  18  /  ALT  19  /  AlkPhos  66  11-27      LABORATORY:    CBC Full  -  ( 27 Nov 2020 12:44 )  WBC Count : 13.02 K/uL  RBC Count : 4.48 M/uL  Hemoglobin : 13.7 g/dL  Hematocrit : 40.6 %  Platelet Count - Automated : 365 K/uL  Mean Cell Volume : 90.6 fl  Mean Cell Hemoglobin : 30.6 pg  Mean Cell Hemoglobin Concentration : 33.7 gm/dL  Auto Neutrophil # : 10.24 K/uL  Auto Lymphocyte # : 1.69 K/uL  Auto Monocyte # : 0.64 K/uL  Auto Eosinophil # : 0.36 K/uL  Auto Basophil # : 0.02 K/uL  Auto Neutrophil % : 78.6 %  Auto Lymphocyte % : 13.0 %  Auto Monocyte % : 4.9 %  Auto Eosinophil % : 2.8 %  Auto Basophil % : 0.2 %        ESR:                   11-27 @ 12:44  --    C-Reactive Protein:     11-27 @ 12:44  --    Procalcitonin:           11-27 @ 12:44   <0.05      11-27    142  |  106  |  17  ----------------------------<  97  3.8   |  28  |  1.00    Ca    8.8      27 Nov 2020 12:44    TPro  8.0  /  Alb  3.2<L>  /  TBili  0.4  /  DBili  x   /  AST  18  /  ALT  19  /  AlkPhos  66  11-27      Assessment and Plans:     1. UTI.   2. Toxic metabolic encephalopathy.  3. Weakness    . Continue IV Rocephin.  . Follow cultures.  . Monitor mental status.     Thank you                                              Patient is a 89y old  Female who presents with a chief complaint of ALTERED MENTAL STATUS (27 Nov 2020 19:52)        The patient is a 89 year old female [facility] resident was brought to ER with altered mental status and weakness ,found to have probable UTI Admitted for septic workup and evaluation,send blood and urine cx,serial lactate levels,monitor vitals closley,ivfs hydration,monitor urine output and renal profile,iv abx initiated -started on ceftriaxone in ER and ID CONS requested .CT Brain was done and showed Volume loss, microvascular disease, age indeterminate lacunar infarct including  left ventral medial thalamus, MR sensitive for new ischemia. No acute hemorrhage or midline shift. Right ostiomeatal complex obstruction with right frontal, ethmoid and maxillary mucoceles right maxillary bony hyperostosis,, mass effect as polyp or inverting papilloma not excluded,report was discussed with neurologist and no new interventions recommended ,continue home rx .Patient;s past med hx is significant for Anemia  ,Cognitive deficits  ,constipation ,Depression  ,HLD (hyperlipidemia)  ,HTN (hypertension)  ,TIA (transient ischemic attack)  .Found to have BP elevation and cardiology consult requested for comanagement Admitted  to telemetry unit for monitoring , send 3 sets of cardiac enzymes to rule out acute coronary event, obtain ECHO to evaluate LVEF, cardiology consult  ,continue current management, O2 supply, anticoagulation plan as per cardiology consult COVID PCR sent and currently is pending . Palliative care consult requested ,to discuss advance directives and complete MOLST  (27 Nov 2020 18:15)      Asked to see patient for ID Consult    PAST MEDICAL & SURGICAL HISTORY:  Constipation    HLD (hyperlipidemia)    Depression    HTN (hypertension)    Cognitive deficits    Weakness    Anemia    TIA (transient ischemic attack)        Allergies    No Known Allergies    Intolerances        REVIEW OF SYSTEMS:  No vomiting or abdominal pain      HOME MEDICATIONS:    MEDICATIONS  (STANDING):  amLODIPine   Tablet 5 milliGRAM(s) Oral daily  aspirin enteric coated 81 milliGRAM(s) Oral daily  cholecalciferol 1000 Unit(s) Oral daily  dextrose 5% + sodium chloride 0.45%. 1000 milliLiter(s) (50 mL/Hr) IV Continuous <Continuous>  escitalopram 10 milliGRAM(s) Oral daily  heparin   Injectable 5000 Unit(s) SubCutaneous every 12 hours  pantoprazole    Tablet 40 milliGRAM(s) Oral before breakfast  polyethylene glycol 3350 17 Gram(s) Oral daily    MEDICATIONS  (PRN):  acetaminophen   Tablet .. 650 milliGRAM(s) Oral every 6 hours PRN Temp greater or equal to 38C (100.4F), Mild Pain (1 - 3)  hydrALAZINE 10 milliGRAM(s) Oral four times a day PRN Systolic blood pressure >180  magnesium hydroxide Suspension 30 milliLiter(s) Oral daily PRN Constipation  melatonin 3 milliGRAM(s) Oral at bedtime PRN Insomnia      Vital Signs Last 24 Hrs  T(C): 36.8 (27 Nov 2020 19:10), Max: 36.9 (27 Nov 2020 12:41)  T(F): 98.2 (27 Nov 2020 19:10), Max: 98.4 (27 Nov 2020 12:41)  HR: 81 (27 Nov 2020 19:10) (66 - 81)  BP: 147/86 (27 Nov 2020 19:10) (147/86 - 195/86)  BP(mean): --  RR: 16 (27 Nov 2020 19:10) (16 - 16)  SpO2: 97% (27 Nov 2020 19:10) (94% - 98%)    PHYSICAL EXAM:  HEENT: NC/AT  Neck: oft   Lungs: Coarse BS   Heart: RRR  Abdomen: Soft  Genital/ Rectal: No garcia   Extremities: No ulcers,   Neurologic: Confused.     I&O's Summary      LABORATORY:                          13.7   13.02 )-----------( 365      ( 27 Nov 2020 12:44 )             40.6       ESR:                   11-27 @ 12:44  --    C-Reactive Protein:     11-27 @ 12:44  --    Procalcitonin:           11-27 @ 12:44   <0.05      11-27    142  |  106  |  17  ----------------------------<  97  3.8   |  28  |  1.00    Ca    8.8      27 Nov 2020 12:44    TPro  8.0  /  Alb  3.2<L>  /  TBili  0.4  /  DBili  x   /  AST  18  /  ALT  19  /  AlkPhos  66  11-27      LABORATORY:    CBC Full  -  ( 27 Nov 2020 12:44 )  WBC Count : 13.02 K/uL  RBC Count : 4.48 M/uL  Hemoglobin : 13.7 g/dL  Hematocrit : 40.6 %  Platelet Count - Automated : 365 K/uL  Mean Cell Volume : 90.6 fl  Mean Cell Hemoglobin : 30.6 pg  Mean Cell Hemoglobin Concentration : 33.7 gm/dL  Auto Neutrophil # : 10.24 K/uL  Auto Lymphocyte # : 1.69 K/uL  Auto Monocyte # : 0.64 K/uL  Auto Eosinophil # : 0.36 K/uL  Auto Basophil # : 0.02 K/uL  Auto Neutrophil % : 78.6 %  Auto Lymphocyte % : 13.0 %  Auto Monocyte % : 4.9 %  Auto Eosinophil % : 2.8 %  Auto Basophil % : 0.2 %        ESR:                   11-27 @ 12:44  --    C-Reactive Protein:     11-27 @ 12:44  --    Procalcitonin:           11-27 @ 12:44   <0.05      11-27    142  |  106  |  17  ----------------------------<  97  3.8   |  28  |  1.00    Ca    8.8      27 Nov 2020 12:44    TPro  8.0  /  Alb  3.2<L>  /  TBili  0.4  /  DBili  x   /  AST  18  /  ALT  19  /  AlkPhos  66  11-27      Assessment and Plans:     1. UTI.   2. Toxic metabolic encephalopathy.  3. Weakness    . Continue IV Rocephin.  . Follow cultures.  . Monitor mental status.     Thank you                                              Patient is a 89y old  Female who presents with a chief complaint of ALTERED MENTAL STATUS (27 Nov 2020 19:52)        The patient is a 89 year old female [facility] resident with a history of HTN, constipation, anemia, TIA and depression was brought to ER today with altered mental status and weakness. In the ED she was afebrile but had high WBC of 13K. UA was suggestive of UTI. CT Brain was done and showed volume loss, microvascular disease, age indeterminate lacunar infarct including  left ventral medial thalamus. She was admitted and placed on IV Rocephin. There was no report of cough or SOB or hypoxia. CXR was negative for pneumonia. COVID 19 PCR was negative.       PAST MEDICAL & SURGICAL HISTORY:  Constipation    HLD (hyperlipidemia)    Depression    HTN (hypertension)    Cognitive deficits    Weakness    Anemia    TIA (transient ischemic attack)        Allergies    No Known Allergies    Intolerances        REVIEW OF SYSTEMS:  No vomiting or abdominal pain  No hematuria    HOME MEDICATIONS:    MEDICATIONS  (STANDING):  amLODIPine   Tablet 5 milliGRAM(s) Oral daily  aspirin enteric coated 81 milliGRAM(s) Oral daily  cholecalciferol 1000 Unit(s) Oral daily  dextrose 5% + sodium chloride 0.45%. 1000 milliLiter(s) (50 mL/Hr) IV Continuous <Continuous>  escitalopram 10 milliGRAM(s) Oral daily  heparin   Injectable 5000 Unit(s) SubCutaneous every 12 hours  pantoprazole    Tablet 40 milliGRAM(s) Oral before breakfast  polyethylene glycol 3350 17 Gram(s) Oral daily    MEDICATIONS  (PRN):  acetaminophen   Tablet .. 650 milliGRAM(s) Oral every 6 hours PRN Temp greater or equal to 38C (100.4F), Mild Pain (1 - 3)  hydrALAZINE 10 milliGRAM(s) Oral four times a day PRN Systolic blood pressure >180  magnesium hydroxide Suspension 30 milliLiter(s) Oral daily PRN Constipation  melatonin 3 milliGRAM(s) Oral at bedtime PRN Insomnia      Vital Signs Last 24 Hrs  T(C): 36.8 (27 Nov 2020 19:10), Max: 36.9 (27 Nov 2020 12:41)  T(F): 98.2 (27 Nov 2020 19:10), Max: 98.4 (27 Nov 2020 12:41)  HR: 81 (27 Nov 2020 19:10) (66 - 81)  BP: 147/86 (27 Nov 2020 19:10) (147/86 - 195/86)  BP(mean): --  RR: 16 (27 Nov 2020 19:10) (16 - 16)  SpO2: 97% (27 Nov 2020 19:10) (94% - 98%)    PHYSICAL EXAM:  HEENT: NC/AT  Neck: soft, no tenderness.  Lungs: Coarse BS bilaterally; no wheezing.   Heart: RRR, no murmurs.   Abdomen: Soft, no tenderness.   Genital/ Rectal: No garcia   Extremities: No ulcers. no edema.   Neurologic: Confused.     I&O's Summary      LABORATORY:                          13.7   13.02 )-----------( 365      ( 27 Nov 2020 12:44 )             40.6       ESR:                   11-27 @ 12:44  --    C-Reactive Protein:     11-27 @ 12:44  --    Procalcitonin:           11-27 @ 12:44   <0.05      11-27    142  |  106  |  17  ----------------------------<  97  3.8   |  28  |  1.00    Ca    8.8      27 Nov 2020 12:44    TPro  8.0  /  Alb  3.2<L>  /  TBili  0.4  /  DBili  x   /  AST  18  /  ALT  19  /  AlkPhos  66  11-27      LABORATORY:    CBC Full  -  ( 27 Nov 2020 12:44 )  WBC Count : 13.02 K/uL  RBC Count : 4.48 M/uL  Hemoglobin : 13.7 g/dL  Hematocrit : 40.6 %  Platelet Count - Automated : 365 K/uL  Mean Cell Volume : 90.6 fl  Mean Cell Hemoglobin : 30.6 pg  Mean Cell Hemoglobin Concentration : 33.7 gm/dL  Auto Neutrophil # : 10.24 K/uL  Auto Lymphocyte # : 1.69 K/uL  Auto Monocyte # : 0.64 K/uL  Auto Eosinophil # : 0.36 K/uL  Auto Basophil # : 0.02 K/uL  Auto Neutrophil % : 78.6 %  Auto Lymphocyte % : 13.0 %  Auto Monocyte % : 4.9 %  Auto Eosinophil % : 2.8 %  Auto Basophil % : 0.2 %        ESR:                   11-27 @ 12:44  --    C-Reactive Protein:     11-27 @ 12:44  --    Procalcitonin:           11-27 @ 12:44   <0.05      11-27    142  |  106  |  17  ----------------------------<  97  3.8   |  28  |  1.00    Ca    8.8      27 Nov 2020 12:44    TPro  8.0  /  Alb  3.2<L>  /  TBili  0.4  /  DBili  x   /  AST  18  /  ALT  19  /  AlkPhos  66  11-27      Assessment and Plans:     1. UTI.   2. Toxic metabolic encephalopathy.  3. Weakness    . Continue IV Rocephin.  . Follow cultures.  . Monitor mental status.     Thank you for the courtesy of this consultation.

## 2020-11-27 NOTE — H&P ADULT - NSICDXPASTMEDICALHX_GEN_ALL_CORE_FT
PAST MEDICAL HISTORY:  Anemia     Cognitive deficits     Constipation     Depression     HLD (hyperlipidemia)     HTN (hypertension)     TIA (transient ischemic attack)     Weakness

## 2020-11-27 NOTE — H&P ADULT - NSHPATTENDINGPLANDISCUSS_GEN_ALL_CORE
ER PHYSICIAN , , ,TRIED TO CALL MACI KEARNEY [phone] ,was not able to leave a message ( "called party is temporarily not available ")

## 2020-11-27 NOTE — H&P ADULT - NEGATIVE OPHTHALMOLOGIC SYMPTOMS
no blurred vision L/no blurred vision R/no photophobia/no lacrimation L/no lacrimation R/no diplopia

## 2020-11-27 NOTE — H&P ADULT - ASSESSMENT
89 YO F ,[place] [facility] resident was brought to ER with altered mental status and weakness ,found to have probable UTI Admitted for septic workup and evaluation,send blood and urine cx,serial lactate levels,monitor vitals closley,ivfs hydration,monitor urine output and renal profile,iv abx initiated -started on ceftriaxone in ER and ID CONS requested .CT Brain was done and showed Volume loss, microvascular disease, age indeterminate lacunar infarct including  left ventral medial thalamus, MR sensitive for new ischemia. No acute hemorrhage or midline shift. Right ostiomeatal complex obstruction with right frontal, ethmoid and maxillary mucoceles right maxillary bony hyperostosis,, mass effect as polyp or inverting papilloma not excluded,report was discussed with neurologist and no new interventions recommended ,continue home rx .Patient;s past med hx is significant for Anemia  ,Cognitive deficits  ,constipation ,Depression  ,HLD (hyperlipidemia)  ,HTN (hypertension)  ,TIA (transient ischemic attack)  .Found to have BP elevation and cardiology consult requested for comanagement Admitted  to telemetry unit for monitoring , send 3 sets of cardiac enzymes to rule out acute coronary event, obtain ECHO to evaluate LVEF, cardiology consult  ,continue current management, O2 supply, anticoagulation plan as per cardiology consult Palliative care consult requested ,to discuss advance directives and complete MOLST

## 2020-11-28 DIAGNOSIS — Z51.5 ENCOUNTER FOR PALLIATIVE CARE: ICD-10-CM

## 2020-11-28 LAB
ALBUMIN SERPL ELPH-MCNC: 2.9 G/DL — LOW (ref 3.3–5)
ALP SERPL-CCNC: 60 U/L — SIGNIFICANT CHANGE UP (ref 40–120)
ALT FLD-CCNC: 16 U/L — SIGNIFICANT CHANGE UP (ref 12–78)
ANION GAP SERPL CALC-SCNC: 8 MMOL/L — SIGNIFICANT CHANGE UP (ref 5–17)
AST SERPL-CCNC: 17 U/L — SIGNIFICANT CHANGE UP (ref 15–37)
BILIRUB SERPL-MCNC: 0.4 MG/DL — SIGNIFICANT CHANGE UP (ref 0.2–1.2)
BUN SERPL-MCNC: 11 MG/DL — SIGNIFICANT CHANGE UP (ref 7–23)
CALCIUM SERPL-MCNC: 8.4 MG/DL — LOW (ref 8.5–10.1)
CHLORIDE SERPL-SCNC: 106 MMOL/L — SIGNIFICANT CHANGE UP (ref 96–108)
CHOLEST SERPL-MCNC: 233 MG/DL — HIGH
CO2 SERPL-SCNC: 27 MMOL/L — SIGNIFICANT CHANGE UP (ref 22–31)
CREAT SERPL-MCNC: 0.84 MG/DL — SIGNIFICANT CHANGE UP (ref 0.5–1.3)
GLUCOSE SERPL-MCNC: 110 MG/DL — HIGH (ref 70–99)
HCT VFR BLD CALC: 40.4 % — SIGNIFICANT CHANGE UP (ref 34.5–45)
HDLC SERPL-MCNC: 29 MG/DL — LOW
HGB BLD-MCNC: 13.5 G/DL — SIGNIFICANT CHANGE UP (ref 11.5–15.5)
LACTATE SERPL-SCNC: 1 MMOL/L — SIGNIFICANT CHANGE UP (ref 0.7–2)
LIPID PNL WITH DIRECT LDL SERPL: 175 MG/DL — HIGH
MCHC RBC-ENTMCNC: 30.1 PG — SIGNIFICANT CHANGE UP (ref 27–34)
MCHC RBC-ENTMCNC: 33.4 GM/DL — SIGNIFICANT CHANGE UP (ref 32–36)
MCV RBC AUTO: 90.2 FL — SIGNIFICANT CHANGE UP (ref 80–100)
NON HDL CHOLESTEROL: 204 MG/DL — HIGH
NRBC # BLD: 0 /100 WBCS — SIGNIFICANT CHANGE UP (ref 0–0)
PLATELET # BLD AUTO: 341 K/UL — SIGNIFICANT CHANGE UP (ref 150–400)
POTASSIUM SERPL-MCNC: 3.2 MMOL/L — LOW (ref 3.5–5.3)
POTASSIUM SERPL-SCNC: 3.2 MMOL/L — LOW (ref 3.5–5.3)
PROCALCITONIN SERPL-MCNC: <0.05 — SIGNIFICANT CHANGE UP (ref 0–0.04)
PROT SERPL-MCNC: 7.3 G/DL — SIGNIFICANT CHANGE UP (ref 6–8.3)
RBC # BLD: 4.48 M/UL — SIGNIFICANT CHANGE UP (ref 3.8–5.2)
RBC # FLD: 13.5 % — SIGNIFICANT CHANGE UP (ref 10.3–14.5)
SARS-COV-2 IGG SERPL QL IA: NEGATIVE — SIGNIFICANT CHANGE UP
SARS-COV-2 IGM SERPL IA-ACNC: 1.01 INDEX — SIGNIFICANT CHANGE UP
SODIUM SERPL-SCNC: 141 MMOL/L — SIGNIFICANT CHANGE UP (ref 135–145)
TRIGL SERPL-MCNC: 143 MG/DL — SIGNIFICANT CHANGE UP
TROPONIN I SERPL-MCNC: 0.17 NG/ML — HIGH (ref 0.01–0.04)
TSH SERPL-MCNC: 3.39 UIU/ML — SIGNIFICANT CHANGE UP (ref 0.36–3.74)
WBC # BLD: 13.58 K/UL — HIGH (ref 3.8–10.5)
WBC # FLD AUTO: 13.58 K/UL — HIGH (ref 3.8–10.5)

## 2020-11-28 RX ADMIN — CEFTRIAXONE 100 MILLIGRAM(S): 500 INJECTION, POWDER, FOR SOLUTION INTRAMUSCULAR; INTRAVENOUS at 12:03

## 2020-11-28 RX ADMIN — PANTOPRAZOLE SODIUM 40 MILLIGRAM(S): 20 TABLET, DELAYED RELEASE ORAL at 05:18

## 2020-11-28 RX ADMIN — HEPARIN SODIUM 5000 UNIT(S): 5000 INJECTION INTRAVENOUS; SUBCUTANEOUS at 05:18

## 2020-11-28 RX ADMIN — Medication 81 MILLIGRAM(S): at 11:21

## 2020-11-28 RX ADMIN — AMLODIPINE BESYLATE 5 MILLIGRAM(S): 2.5 TABLET ORAL at 05:18

## 2020-11-28 RX ADMIN — Medication 1000 UNIT(S): at 11:22

## 2020-11-28 RX ADMIN — ESCITALOPRAM OXALATE 10 MILLIGRAM(S): 10 TABLET, FILM COATED ORAL at 11:21

## 2020-11-28 RX ADMIN — HEPARIN SODIUM 5000 UNIT(S): 5000 INJECTION INTRAVENOUS; SUBCUTANEOUS at 17:07

## 2020-11-28 RX ADMIN — POLYETHYLENE GLYCOL 3350 17 GRAM(S): 17 POWDER, FOR SOLUTION ORAL at 11:22

## 2020-11-28 RX ADMIN — Medication 10 MILLIGRAM(S): at 05:17

## 2020-11-28 NOTE — PROGRESS NOTE ADULT - SUBJECTIVE AND OBJECTIVE BOX
Interval History:    CENTRAL LINE:   [  ] YES       [  ] NO  OLMOS:                 [  ] YES       [  ] NO         REVIEW OF SYSTEMS:  All Systems below were reviewed and are negative [  ]  HEENT:  ID:  Pulmonary:  Cardiac:  GI:  Renal:  Musculoskeletal:  All other systems above were reviewed and are negative   [  ]      MEDICATIONS  (STANDING):  amLODIPine   Tablet 5 milliGRAM(s) Oral daily  aspirin enteric coated 81 milliGRAM(s) Oral daily  cefTRIAXone   IVPB 1000 milliGRAM(s) IV Intermittent every 24 hours  cholecalciferol 1000 Unit(s) Oral daily  dextrose 5% + sodium chloride 0.45%. 1000 milliLiter(s) (50 mL/Hr) IV Continuous <Continuous>  escitalopram 10 milliGRAM(s) Oral daily  heparin   Injectable 5000 Unit(s) SubCutaneous every 12 hours  pantoprazole    Tablet 40 milliGRAM(s) Oral before breakfast  polyethylene glycol 3350 17 Gram(s) Oral daily    MEDICATIONS  (PRN):  acetaminophen   Tablet .. 650 milliGRAM(s) Oral every 6 hours PRN Temp greater or equal to 38C (100.4F), Mild Pain (1 - 3)  hydrALAZINE 10 milliGRAM(s) Oral four times a day PRN Systolic blood pressure >180  magnesium hydroxide Suspension 30 milliLiter(s) Oral daily PRN Constipation  melatonin 3 milliGRAM(s) Oral at bedtime PRN Insomnia      Vital Signs Last 24 Hrs  T(C): 37.2 (28 Nov 2020 16:00), Max: 37.2 (28 Nov 2020 16:00)  T(F): 99 (28 Nov 2020 16:00), Max: 99 (28 Nov 2020 16:00)  HR: 73 (28 Nov 2020 16:00) (64 - 81)  BP: 176/61 (28 Nov 2020 16:00) (137/69 - 187/69)  BP(mean): --  RR: 17 (28 Nov 2020 16:00) (16 - 19)  SpO2: 95% (28 Nov 2020 16:00) (94% - 97%)    I&O's Summary    28 Nov 2020 07:01  -  28 Nov 2020 19:09  --------------------------------------------------------  IN: 0 mL / OUT: 200 mL / NET: -200 mL        PHYSICAL EXAM:  HEENT: NC/AT; PERRLA  Neck: Soft; no tenderness  Lungs: CTA bilaterally; no wheezing.   Heart:  Abdomen:  Genital/ Rectal:  Extremities:  Neurologic:  Vascular:      LABORATORY:    CBC Full  -  ( 28 Nov 2020 05:40 )  WBC Count : 13.58 K/uL  RBC Count : 4.48 M/uL  Hemoglobin : 13.5 g/dL  Hematocrit : 40.4 %  Platelet Count - Automated : 341 K/uL  Mean Cell Volume : 90.2 fl  Mean Cell Hemoglobin : 30.1 pg  Mean Cell Hemoglobin Concentration : 33.4 gm/dL  Auto Neutrophil # : x  Auto Lymphocyte # : x  Auto Monocyte # : x  Auto Eosinophil # : x  Auto Basophil # : x  Auto Neutrophil % : x  Auto Lymphocyte % : x  Auto Monocyte % : x  Auto Eosinophil % : x  Auto Basophil % : x      ESR:                   11-28 @ 05:40  --    C-Reactive Protein:     11-28 @ 05:40  --    Procalcitonin:           11-28 @ 05:40   <0.05  ESR:                   11-27 @ 12:44  --    C-Reactive Protein:     11-27 @ 12:44  --    Procalcitonin:           11-27 @ 12:44   <0.05      11-28    141  |  106  |  11  ----------------------------<  110<H>  3.2<L>   |  27  |  0.84    Ca    8.4<L>      28 Nov 2020 05:40    TPro  7.3  /  Alb  2.9<L>  /  TBili  0.4  /  DBili  x   /  AST  17  /  ALT  16  /  AlkPhos  60  11-28          Assessment and Plan:          Wayne Wall MD   [phone].   She is afebrile  Comfortable.     MEDICATIONS  (STANDING):  amLODIPine   Tablet 5 milliGRAM(s) Oral daily  aspirin enteric coated 81 milliGRAM(s) Oral daily  cefTRIAXone   IVPB 1000 milliGRAM(s) IV Intermittent every 24 hours  cholecalciferol 1000 Unit(s) Oral daily  dextrose 5% + sodium chloride 0.45%. 1000 milliLiter(s) (50 mL/Hr) IV Continuous <Continuous>  escitalopram 10 milliGRAM(s) Oral daily  heparin   Injectable 5000 Unit(s) SubCutaneous every 12 hours  pantoprazole    Tablet 40 milliGRAM(s) Oral before breakfast  polyethylene glycol 3350 17 Gram(s) Oral daily    MEDICATIONS  (PRN):  acetaminophen   Tablet .. 650 milliGRAM(s) Oral every 6 hours PRN Temp greater or equal to 38C (100.4F), Mild Pain (1 - 3)  hydrALAZINE 10 milliGRAM(s) Oral four times a day PRN Systolic blood pressure >180  magnesium hydroxide Suspension 30 milliLiter(s) Oral daily PRN Constipation  melatonin 3 milliGRAM(s) Oral at bedtime PRN Insomnia      Vital Signs Last 24 Hrs  T(C): 37.2 (28 Nov 2020 16:00), Max: 37.2 (28 Nov 2020 16:00)  T(F): 99 (28 Nov 2020 16:00), Max: 99 (28 Nov 2020 16:00)  HR: 73 (28 Nov 2020 16:00) (64 - 81)  BP: 176/61 (28 Nov 2020 16:00) (137/69 - 187/69)  BP(mean): --  RR: 17 (28 Nov 2020 16:00) (16 - 19)  SpO2: 95% (28 Nov 2020 16:00) (94% - 97%)    I&O's Summary    28 Nov 2020 07:01  -  28 Nov 2020 19:09  --------------------------------------------------------  IN: 0 mL / OUT: 200 mL / NET: -200 mL      PHYSICAL EXAM:  HEENT: NC/AT; PERRLA  Neck: Soft; no tenderness  Lungs: Coarse BS bilaterally; no wheezing.   Heart: RRR; no murmurs.   Abdomen: Soft; no tenderness.   Extremities: No ulcers.   Neurologic: Confused.       LABORATORY:    CBC Full  -  ( 28 Nov 2020 05:40 )  WBC Count : 13.58 K/uL  RBC Count : 4.48 M/uL  Hemoglobin : 13.5 g/dL  Hematocrit : 40.4 %  Platelet Count - Automated : 341 K/uL  Mean Cell Volume : 90.2 fl  Mean Cell Hemoglobin : 30.1 pg  Mean Cell Hemoglobin Concentration : 33.4 gm/dL  Auto Neutrophil # : x  Auto Lymphocyte # : x  Auto Monocyte # : x  Auto Eosinophil # : x  Auto Basophil # : x  Auto Neutrophil % : x  Auto Lymphocyte % : x  Auto Monocyte % : x  Auto Eosinophil % : x  Auto Basophil % : x      ESR:                   11-28 @ 05:40  --    C-Reactive Protein:     11-28 @ 05:40  --    Procalcitonin:           11-28 @ 05:40   <0.05  ESR:                   11-27 @ 12:44  --    C-Reactive Protein:     11-27 @ 12:44  --    Procalcitonin:           11-27 @ 12:44   <0.05      11-28    141  |  106  |  11  ----------------------------<  110<H>  3.2<L>   |  27  |  0.84    Ca    8.4<L>      28 Nov 2020 05:40    TPro  7.3  /  Alb  2.9<L>  /  TBili  0.4  /  DBili  x   /  AST  17  /  ALT  16  /  AlkPhos  60  11-28    Assessment and Plan:    1. UTI.   2. Toxic metabolic encephalopathy.  3. Weakness.  4. Leukocytosis.    . Urine culture is growing E coli. Continue IV Rocephin.  . Follow cultures. Anticipate oral antibiotics in 48 hrs.      Wayne Wall MD   [phone].

## 2020-11-28 NOTE — PROGRESS NOTE ADULT - SUBJECTIVE AND OBJECTIVE BOX
Patient is a 89y Female with a known history of : admitted with ams and uti  Palliative care encounter [Z51.5]    Prophylactic measure [Z29.9]    Constipation [K59.00]    Weakness [R53.1]    Depression [F32.9]    Abnormal CT scan of head [R93.0]    HTN (hypertension) [I10]    UTI (urinary tract infection) [N39.0]    Altered mental status [R41.82]      HPI:  89 YO F ,[place] alf resident was brought to ER with altered mental status and weakness ,found to have probable UTI Admitted for septic workup and evaluation,send blood and urine cx,serial lactate levels,monitor vitals closley,ivfs hydration,monitor urine output and renal profile,iv abx initiated -started on ceftriaxone in ER and ID CONS requested .CT Brain was done and showed Volume loss, microvascular disease, age indeterminate lacunar infarct including  left ventral medial thalamus, MR sensitive for new ischemia. No acute hemorrhage or midline shift. Right ostiomeatal complex obstruction with right frontal, ethmoid and maxillary mucoceles right maxillary bony hyperostosis,, mass effect as polyp or inverting papilloma not excluded,report was discussed with neurologist and no new interventions recommended ,continue home rx .Patient;s past med hx is significant for Anemia  ,Cognitive deficits  ,constipation ,Depression  ,HLD (hyperlipidemia)  ,HTN (hypertension)  ,TIA (transient ischemic attack)  .Found to have BP elevation and cardiology consult requested for comanagement Admitted  to telemetry unit for monitoring , send 3 sets of cardiac enzymes to rule out acute coronary event, obtain ECHO to evaluate LVEF, cardiology consult  ,continue current management, O2 supply, anticoagulation plan as per cardiology consult COVID PCR sent and currently is pending . Palliative care consult requested ,to discuss advance directives and complete MOLST  (27 Nov 2020 18:15)      REVIEW OF SYSTEMS:    CONSTITUTIONAL: No fever, weight loss, or fatigue  EYES: No eye pain, visual disturbances, or discharge  ENMT:  No difficulty hearing, tinnitus, vertigo; No sinus or throat pain  NECK: No pain or stiffness  BREASTS: No pain, masses, or nipple discharge  RESPIRATORY: No cough, wheezing, chills or hemoptysis; No shortness of breath  CARDIOVASCULAR: No chest pain, palpitations, dizziness, or leg swelling  GASTROINTESTINAL: No abdominal or epigastric pain. No nausea, vomiting, or hematemesis; No diarrhea or constipation. No melena or hematochezia.  GENITOURINARY: No dysuria, frequency, hematuria, or incontinence  NEUROLOGICAL: No headaches, memory loss, loss of strength, numbness, or tremors  SKIN: No itching, burning, rashes, or lesions   LYMPH NODES: No enlarged glands  ENDOCRINE: No heat or cold intolerance; No hair loss  MUSCULOSKELETAL: No joint pain or swelling; No muscle, back, or extremity pain  PSYCHIATRIC: No depression, anxiety, mood swings, or difficulty sleeping  HEME/LYMPH: No easy bruising, or bleeding gums  ALLERGY AND IMMUNOLOGIC: No hives or eczema    MEDICATIONS  (STANDING):  amLODIPine   Tablet 5 milliGRAM(s) Oral daily  aspirin enteric coated 81 milliGRAM(s) Oral daily  cefTRIAXone   IVPB 1000 milliGRAM(s) IV Intermittent every 24 hours  cholecalciferol 1000 Unit(s) Oral daily  dextrose 5% + sodium chloride 0.45%. 1000 milliLiter(s) (50 mL/Hr) IV Continuous <Continuous>  escitalopram 10 milliGRAM(s) Oral daily  heparin   Injectable 5000 Unit(s) SubCutaneous every 12 hours  pantoprazole    Tablet 40 milliGRAM(s) Oral before breakfast  polyethylene glycol 3350 17 Gram(s) Oral daily    MEDICATIONS  (PRN):  acetaminophen   Tablet .. 650 milliGRAM(s) Oral every 6 hours PRN Temp greater or equal to 38C (100.4F), Mild Pain (1 - 3)  hydrALAZINE 10 milliGRAM(s) Oral four times a day PRN Systolic blood pressure >180  magnesium hydroxide Suspension 30 milliLiter(s) Oral daily PRN Constipation  melatonin 3 milliGRAM(s) Oral at bedtime PRN Insomnia      ALLERGIES: No Known Allergies      FAMILY HISTORY:      PHYSICAL EXAMINATION:  -----------------------------  T(C): 36.5 (11-28-20 @ 07:35), Max: 36.9 (11-27-20 @ 12:41)  HR: 64 (11-28-20 @ 07:35) (64 - 81)  BP: 160/68 (11-28-20 @ 07:35) (137/69 - 195/86)  RR: 19 (11-28-20 @ 07:35) (16 - 19)  SpO2: 95% (11-28-20 @ 07:35) (94% - 98%)  Wt(kg): --    Height (cm): 139.7 (11-27 @ 11:52)  Weight (kg): 49.9 (11-27 @ 11:52)  BMI (kg/m2): 25.6 (11-27 @ 11:52)  BSA (m2): 1.36 (11-27 @ 11:52)    VITALS  T(C): 36.5 (11-28-20 @ 07:35), Max: 36.9 (11-27-20 @ 12:41)  HR: 64 (11-28-20 @ 07:35) (64 - 81)  BP: 160/68 (11-28-20 @ 07:35) (137/69 - 195/86)  RR: 19 (11-28-20 @ 07:35) (16 - 19)  SpO2: 95% (11-28-20 @ 07:35) (94% - 98%)    Constitutional: well developed, normal appearance, well groomed, well nourished, no deformities and no acute distress.   Eyes: the conjunctiva exhibited no abnormalities and the eyelids demonstrated no xanthelasmas.   HEENT: normal oral mucosa, no oral pallor and no oral cyanosis.   Neck: normal jugular venous A waves present, normal jugular venous V waves present and no jugular venous blake A waves.   Pulmonary: no respiratory distress, normal respiratory rhythm and effort, no accessory muscle use and lungs were clear to auscultation bilaterally.   Cardiovascular: heart rate and rhythm were normal, normal S1 and S2 and no murmur, gallop, rub, heave or thrill are present.   Abdomen: soft, non-tender, no hepato-splenomegaly and no abdominal mass palpated.   Musculoskeletal: the gait could not be assessed..   Extremities: no clubbing of the fingernails, no localized cyanosis, no petechial hemorrhages and no ischemic changes.   Skin: normal skin color and pigmentation, no rash, no venous stasis, no skin lesions, no skin ulcer and no xanthoma was observed.   Psychiatric: oriented to person, place, and time, the affect was normal, the mood was normal and not feeling anxious.     LABS:   --------  11-28    141  |  106  |  11  ----------------------------<  110<H>  3.2<L>   |  27  |  0.84    Ca    8.4<L>      28 Nov 2020 05:40    TPro  7.3  /  Alb  2.9<L>  /  TBili  0.4  /  DBili  x   /  AST  17  /  ALT  16  /  AlkPhos  60  11-28                         13.5   13.58 )-----------( 341      ( 28 Nov 2020 05:40 )             40.4     PT/INR - ( 27 Nov 2020 12:44 )   PT: 13.3 sec;   INR: 1.14 ratio         PTT - ( 27 Nov 2020 12:44 )  PTT:32.2 sec    11-28 @ 05:40 CPK total:--, CKMB --, Troponin I - .167 ng/mL<H>  11-27 @ 12:44 CPK total:--, CKMB --, Troponin I - <.015 ng/mL          RADIOLOGY:  -----------------    ECG:     ECHO:

## 2020-11-28 NOTE — CONSULT NOTE ADULT - PROBLEM SELECTOR RECOMMENDATION 9
89 YO F ,[place] [facility] resident was brought to ER with altered mental status and weakness ,found to have probable UTI Admitted for septic workup   on Dysphagia Diet - asp prec - HOB Elev - oral hygiene  on ABX - for poss UTI - cx pending - UA noted - labs reviewed -   SLP eval pending  on gentle hydration at present  left a message for the son  GOC discussion pending

## 2020-11-28 NOTE — SWALLOW BEDSIDE ASSESSMENT ADULT - COMMENTS
Per charting, the patient is a "89 YO F ,[place] [facility] resident was brought to ER with altered mental status and weakness ,found to have probable UTI Admitted for septic workup and evaluation,send blood and urine cx,serial lactate levels,monitor vitals closley,ivfs hydration,monitor urine output and renal profile,iv abx initiated -started on ceftriaxone in ER and ID CONS requested .    CT HEAD 11/27: "Volume loss, microvascular disease, age indeterminate lacunar infarct including  left ventral medial thalamus, MR sensitive for new ischemia. No acute hemorrhage or midline shift. Right ostiomeatal complex obstruction with right frontal, ethmoid and maxillary mucoceles right maxillary bony hyperostosis,, mass effect as polyp or inverting papilloma not excluded, suggest dedicated ENT assessment. If symptoms persist consider follow-up head CT or MR if no contraindications."    XR CHEST 11/27: "1. Stable chest. 2. Negative for active pulmonary disease."    Consult received and chart reviewed. The patient was seen this morning for an assessment of swallow function, at which time she was arousable to awake/alert state. The patient inconsistently followed simple directives and answered yes/no questions.

## 2020-11-28 NOTE — DIETITIAN INITIAL EVALUATION ADULT. - OTHER INFO
89 year old female Dx UTI change in mental status PMH HLD constipation HTN TIA CVA   patient from [facility] seen sleeping in bed  per RN with fair PO intake  fecal incontinence per flow sheets Folate and B12 ordered

## 2020-11-28 NOTE — CONSULT NOTE ADULT - SUBJECTIVE AND OBJECTIVE BOX
Date/Time Patient Seen:  		  Referring MD:   Data Reviewed	       Patient is a 89y old  Female who presents with a chief complaint of ALTERED MENTAL STATUS (27 Nov 2020 20:05)      Subjective/HPI    in bed  seen and examined  vs and meds reviewed  labs reviewed  H and P reviewed  ER provider note reviewed  poor historian  from [facility]     89 YO F [place] [facility] resident was brought to ER with altered mental status and weakness ,found to have probable UTI Admitted for septic workup and evaluation,send blood and urine cx,serial lactate levels,monitor vitals closley,ivfs hydration,monitor urine output and renal profile,iv abx initiated -started on ceftriaxone in ER and ID CONS requested .CT Brain was done and showed Volume loss, microvascular disease, age indeterminate lacunar infarct including  left ventral medial thalamus, MR sensitive for new ischemia. No acute hemorrhage or midline shift. Right ostiomeatal complex obstruction with right frontal, ethmoid and maxillary mucoceles right maxillary bony hyperostosis,, mass effect as polyp or inverting papilloma not excluded,report was discussed with neurologist and no new interventions recommended ,continue home rx .Patient;s past med hx is significant for Anemia  ,Cognitive deficits  ,constipation ,Depression  ,HLD (hyperlipidemia)  ,HTN (hypertension)  ,TIA (transient ischemic attack)  .Found to have BP elevation and cardiology consult requested for comanagement Admitted  to telemetry unit for monitoring , send 3 sets of cardiac enzymes to rule out acute coronary event, obtain ECHO to evaluate LVEF, cardiology consult  ,continue current management, O2 supply, anticoagulation plan as per cardiology consult COVID PCR sent and currently is pending . Palliative care consult requested ,to discuss advance directives and complete MOLST       non smoker  non drinker  lives in [facility]  family hx - ashd   ros - ams    PAST MEDICAL & SURGICAL HISTORY:  Constipation    HLD (hyperlipidemia)    Depression    HTN (hypertension)    Cognitive deficits    Weakness    Anemia    TIA (transient ischemic attack)          Medication list         MEDICATIONS  (STANDING):  amLODIPine   Tablet 5 milliGRAM(s) Oral daily  aspirin enteric coated 81 milliGRAM(s) Oral daily  cefTRIAXone   IVPB 1000 milliGRAM(s) IV Intermittent every 24 hours  cholecalciferol 1000 Unit(s) Oral daily  dextrose 5% + sodium chloride 0.45%. 1000 milliLiter(s) (50 mL/Hr) IV Continuous <Continuous>  escitalopram 10 milliGRAM(s) Oral daily  heparin   Injectable 5000 Unit(s) SubCutaneous every 12 hours  pantoprazole    Tablet 40 milliGRAM(s) Oral before breakfast  polyethylene glycol 3350 17 Gram(s) Oral daily    MEDICATIONS  (PRN):  acetaminophen   Tablet .. 650 milliGRAM(s) Oral every 6 hours PRN Temp greater or equal to 38C (100.4F), Mild Pain (1 - 3)  hydrALAZINE 10 milliGRAM(s) Oral four times a day PRN Systolic blood pressure >180  magnesium hydroxide Suspension 30 milliLiter(s) Oral daily PRN Constipation  melatonin 3 milliGRAM(s) Oral at bedtime PRN Insomnia         Vitals log        ICU Vital Signs Last 24 Hrs  T(C): 36.6 (28 Nov 2020 05:05), Max: 36.9 (27 Nov 2020 12:41)  T(F): 97.8 (28 Nov 2020 05:05), Max: 98.4 (27 Nov 2020 12:41)  HR: 66 (28 Nov 2020 06:50) (64 - 81)  BP: 147/74 (28 Nov 2020 06:50) (137/69 - 195/86)  BP(mean): --  ABP: --  ABP(mean): --  RR: 17 (28 Nov 2020 05:05) (16 - 17)  SpO2: 97% (28 Nov 2020 05:05) (94% - 98%)           Input and Output:  I&O's Detail      Lab Data                        13.5   13.58 )-----------( 341      ( 28 Nov 2020 05:40 )             40.4     11-28    141  |  106  |  11  ----------------------------<  110<H>  3.2<L>   |  27  |  0.84    Ca    8.4<L>      28 Nov 2020 05:40    TPro  7.3  /  Alb  2.9<L>  /  TBili  0.4  /  DBili  x   /  AST  17  /  ALT  16  /  AlkPhos  60  11-28      CARDIAC MARKERS ( 28 Nov 2020 05:40 )  .167 ng/mL / x     / x     / x     / x      CARDIAC MARKERS ( 27 Nov 2020 12:44 )  <.015 ng/mL / x     / x     / x     / x            Review of Systems	  ams    Objective     Physical Examination    heart s1s2  lung dec BS  abd soft      Pertinent Lab findings & Imaging      [place]:  NO   Adequate UO     I&O's Detail           Discussed with:     Cultures:	        Radiology    EXAM:  CT BRAIN                            PROCEDURE DATE:  11/27/2020          INTERPRETATION:  CLINICAL INDICATION: Mental status change    Technique: Noncontrast CT of the head was performed.    Multiple contiguous axial images were acquired from the skull base to the vertex without the administration of intravenous contrast. Coronal and sagittal reformations were made.    COMPARISON: Head CT, 5/11/2019    FINDINGS:  Redemonstration moderate enlargement of ventricles and sulci consistent with volume loss. Is demonstration of nonspecific white matter decreased attenuation, likely microvascular disease with age indeterminate lacunar infarction at the left ventral medial thalamus new from prior (2:14). There is atherosclerotic vascular calcification along the carotid siphons. There is slight motion, as well as streak artifact with artificial increased attenuation along the right lateral frontal lobe. There is no new intraparenchymal hematoma, mass effect or midline shift. No new abnormal extra-axial fluid collections are present. The basal cisterns remain patent.    Intact calvarium, absent lenses with cataract surgery, interval new right anterior ostiomeatal complex and right frontal recess obstruction with new right maxillary, right frontal and right anterior ethmoidal air cell complete opacification and mucoceles with bony hyperostosis, mass such as polyp or inverting papilloma not excluded, new mucosal thickening along the right posterior ethmoid sinus left frontal and sphenoid sinuses. Suggest dedicated ENT assessment. Mastoid cavities remain unremarkable. Calcific densities  left frontal and right parietal scalp likely represent calcified sebaceous cysts.    IMPRESSION:    Volume loss, microvascular disease, age indeterminate lacunar infarct including  left ventral medial thalamus, MR sensitive for new ischemia. No acute hemorrhage or midline shift. Right ostiomeatal complex obstruction with right frontal, ethmoid and maxillary mucoceles right maxillary bony hyperostosis,, mass effect as polyp or inverting papilloma not excluded, suggest dedicated ENT assessment. If symptoms persist consider follow-up head CT or MR if no contraindications.              TAWNYA SINGH MD; Attending Radiologist        EXAM:  XR CHEST PORTABLE IMMED 1V                            PROCEDURE DATE:  11/27/2020          INTERPRETATION:  DATE OF STUDY: 11/27/2020    PRIOR: 5/11/19    CLINICAL INDICATION: AMS. Lethargy. Assess for chest process.    TECHNIQUE: portable chest.    FINDINGS:  Thoracic aortic atheromatous changes and ectasia are stable.  The cardiomediastinal silhouette is within normal limits.  Pulmonary vascularity appears normal.  No focal consolidations. No pleural effusion or pneumothorax.  No acute bony finding.  Redemonstration of right breast calcifications.    IMPRESSION:  1. Stable chest.  2. Negative for active pulmonary disease.              BEBETO CAMPBELL MD; Attending Radiologist

## 2020-11-28 NOTE — SWALLOW BEDSIDE ASSESSMENT ADULT - SWALLOW EVAL: DIAGNOSIS
1. The patient demonstrated functional oral management of puree and thin liquids marked by adequate bolus collection, transfer and posterior transport. 2. The patient demonstrated a mild oral dysphagia for mechanical soft and minimal amount of regular solids marked by prolonged manipulation resulting in delayed bolus collection, transfer and posterior transport. Lingual residue noted subsequent to deglutition which cleared with a liquid wash. 3. The patient demonstrated a mild pharyngeal dysphagia for all consistencies trialed marked by suspect delayed initiation of swallow trigger with reduced hyolaryngeal elevation upon digital palpation without evidence of laryngeal penetration. 4. Recommend dysphagia 2 mechanical soft with thin liquids + aspiration precautions.

## 2020-11-28 NOTE — SWALLOW BEDSIDE ASSESSMENT ADULT - ASR SWALLOW ASPIRATION MONITOR
gurgly voice/oral hygiene/fever/pneumonia/change of breathing pattern/position upright (89Y)/throat clearing/cough/upper respiratory infection

## 2020-11-29 LAB
-  AMIKACIN: SIGNIFICANT CHANGE UP
-  AMOXICILLIN/CLAVULANIC ACID: SIGNIFICANT CHANGE UP
-  AMPICILLIN/SULBACTAM: SIGNIFICANT CHANGE UP
-  AMPICILLIN/SULBACTAM: SIGNIFICANT CHANGE UP
-  AMPICILLIN: SIGNIFICANT CHANGE UP
-  AMPICILLIN: SIGNIFICANT CHANGE UP
-  AZTREONAM: SIGNIFICANT CHANGE UP
-  CEFAZOLIN: SIGNIFICANT CHANGE UP
-  CEFAZOLIN: SIGNIFICANT CHANGE UP
-  CEFEPIME: SIGNIFICANT CHANGE UP
-  CEFOTAXIME: SIGNIFICANT CHANGE UP
-  CEFOXITIN: SIGNIFICANT CHANGE UP
-  CEFTAZIDIME: SIGNIFICANT CHANGE UP
-  CEFTRIAXONE: SIGNIFICANT CHANGE UP
-  CEFTRIAXONE: SIGNIFICANT CHANGE UP
-  CEFUROXIME: SIGNIFICANT CHANGE UP
-  CIPROFLOXACIN: SIGNIFICANT CHANGE UP
-  ERTAPENEM: SIGNIFICANT CHANGE UP
-  GENTAMICIN: SIGNIFICANT CHANGE UP
-  LEVOFLOXACIN: SIGNIFICANT CHANGE UP
-  MEROPENEM: SIGNIFICANT CHANGE UP
-  MINOCYCLINE: SIGNIFICANT CHANGE UP
-  NITROFURANTOIN: SIGNIFICANT CHANGE UP
-  NITROFURANTOIN: SIGNIFICANT CHANGE UP
-  PIPERACILLIN/TAZOBACTAM: SIGNIFICANT CHANGE UP
-  TIGECYCLINE: SIGNIFICANT CHANGE UP
-  TOBRAMYCIN: SIGNIFICANT CHANGE UP
-  TRIMETHOPRIM/SULFAMETHOXAZOLE: SIGNIFICANT CHANGE UP
CULTURE RESULTS: SIGNIFICANT CHANGE UP
FOLATE SERPL-MCNC: 12.6 NG/ML — SIGNIFICANT CHANGE UP
HCT VFR BLD CALC: 41.7 % — SIGNIFICANT CHANGE UP (ref 34.5–45)
HGB BLD-MCNC: 14.1 G/DL — SIGNIFICANT CHANGE UP (ref 11.5–15.5)
MCHC RBC-ENTMCNC: 30.3 PG — SIGNIFICANT CHANGE UP (ref 27–34)
MCHC RBC-ENTMCNC: 33.8 GM/DL — SIGNIFICANT CHANGE UP (ref 32–36)
MCV RBC AUTO: 89.5 FL — SIGNIFICANT CHANGE UP (ref 80–100)
METHOD TYPE: SIGNIFICANT CHANGE UP
NRBC # BLD: 0 /100 WBCS — SIGNIFICANT CHANGE UP (ref 0–0)
ORGANISM # SPEC MICROSCOPIC CNT: SIGNIFICANT CHANGE UP
ORGANISM # SPEC MICROSCOPIC CNT: SIGNIFICANT CHANGE UP
PLATELET # BLD AUTO: 344 K/UL — SIGNIFICANT CHANGE UP (ref 150–400)
RBC # BLD: 4.66 M/UL — SIGNIFICANT CHANGE UP (ref 3.8–5.2)
RBC # FLD: 13.7 % — SIGNIFICANT CHANGE UP (ref 10.3–14.5)
SPECIMEN SOURCE: SIGNIFICANT CHANGE UP
TSH SERPL-MCNC: 3.18 UIU/ML — SIGNIFICANT CHANGE UP (ref 0.36–3.74)
VIT B12 SERPL-MCNC: 706 PG/ML — SIGNIFICANT CHANGE UP (ref 232–1245)
WBC # BLD: 11.45 K/UL — HIGH (ref 3.8–10.5)
WBC # FLD AUTO: 11.45 K/UL — HIGH (ref 3.8–10.5)

## 2020-11-29 RX ORDER — CEFPODOXIME PROXETIL 100 MG
200 TABLET ORAL EVERY 12 HOURS
Refills: 0 | Status: DISCONTINUED | OUTPATIENT
Start: 2020-11-30 | End: 2020-12-02

## 2020-11-29 RX ADMIN — Medication 1000 UNIT(S): at 11:14

## 2020-11-29 RX ADMIN — CEFTRIAXONE 100 MILLIGRAM(S): 500 INJECTION, POWDER, FOR SOLUTION INTRAMUSCULAR; INTRAVENOUS at 12:16

## 2020-11-29 RX ADMIN — MAGNESIUM HYDROXIDE 30 MILLILITER(S): 400 TABLET, CHEWABLE ORAL at 11:15

## 2020-11-29 RX ADMIN — POLYETHYLENE GLYCOL 3350 17 GRAM(S): 17 POWDER, FOR SOLUTION ORAL at 11:15

## 2020-11-29 RX ADMIN — HEPARIN SODIUM 5000 UNIT(S): 5000 INJECTION INTRAVENOUS; SUBCUTANEOUS at 16:47

## 2020-11-29 RX ADMIN — ESCITALOPRAM OXALATE 10 MILLIGRAM(S): 10 TABLET, FILM COATED ORAL at 11:14

## 2020-11-29 RX ADMIN — Medication 81 MILLIGRAM(S): at 11:15

## 2020-11-29 RX ADMIN — SODIUM CHLORIDE 50 MILLILITER(S): 9 INJECTION, SOLUTION INTRAVENOUS at 05:53

## 2020-11-29 RX ADMIN — AMLODIPINE BESYLATE 5 MILLIGRAM(S): 2.5 TABLET ORAL at 05:50

## 2020-11-29 RX ADMIN — PANTOPRAZOLE SODIUM 40 MILLIGRAM(S): 20 TABLET, DELAYED RELEASE ORAL at 05:50

## 2020-11-29 RX ADMIN — HEPARIN SODIUM 5000 UNIT(S): 5000 INJECTION INTRAVENOUS; SUBCUTANEOUS at 05:50

## 2020-11-29 NOTE — PROGRESS NOTE ADULT - PROBLEM SELECTOR PLAN 1
89 YO F ,[place] [facility] resident was brought to ER with altered mental status and weakness ,found to have probable UTI Admitted for septic workup   on Dysphagia Diet - asp prec - HOB Elev - oral hygiene  on ABX - for poss UTI - cx noted - UA noted - labs reviewed -   SLP eval  - Dysphagia II diet - HOB elev - oral hygiene -   on gentle hydration at present  GOC discussion - spoke with Son - three children all together - Son who lives in [place] does not know if mom is DNR - he will speak with his brothers

## 2020-11-29 NOTE — PHYSICAL THERAPY INITIAL EVALUATION ADULT - IMPAIRMENTS FOUND, PT EVAL
gait, locomotion, and balance/muscle strength/posture/cognitive impairment/gross motor/poor safety awareness/aerobic capacity/endurance

## 2020-11-29 NOTE — PROGRESS NOTE ADULT - SUBJECTIVE AND OBJECTIVE BOX
Patient is a 89y Female with a known history of :  Palliative care encounter [Z51.5]    Prophylactic measure [Z29.9]    Constipation [K59.00]    Weakness [R53.1]    Depression [F32.9]    Abnormal CT scan of head [R93.0]    HTN (hypertension) [I10]    UTI (urinary tract infection) [N39.0]    Altered mental status [R41.82]      HPI:  89 YO F ,[facility] resident was brought to ER with altered mental status and weakness ,found to have probable UTI Admitted for septic workup and evaluation,send blood and urine cx,serial lactate levels,monitor vitals closley,ivfs hydration,monitor urine output and renal profile,iv abx initiated -started on ceftriaxone in ER and ID CONS requested .CT Brain was done and showed Volume loss, microvascular disease, age indeterminate lacunar infarct including  left ventral medial thalamus, MR sensitive for new ischemia. No acute hemorrhage or midline shift. Right ostiomeatal complex obstruction with right frontal, ethmoid and maxillary mucoceles right maxillary bony hyperostosis,, mass effect as polyp or inverting papilloma not excluded,report was discussed with neurologist and no new interventions recommended ,continue home rx .Patient;s past med hx is significant for Anemia  ,Cognitive deficits  ,constipation ,Depression  ,HLD (hyperlipidemia)  ,HTN (hypertension)  ,TIA (transient ischemic attack)  .Found to have BP elevation and cardiology consult requested for comanagement Admitted  to telemetry unit for monitoring , send 3 sets of cardiac enzymes to rule out acute coronary event, obtain ECHO to evaluate LVEF, cardiology consult  ,continue current management, O2 supply, anticoagulation plan as per cardiology consult COVID PCR sent and currently is pending . Palliative care consult requested ,to discuss advance directives and complete MOLST  (27 Nov 2020 18:15)      REVIEW OF SYSTEMS:    CONSTITUTIONAL: No fever, weight loss, or fatigue  EYES: No eye pain, visual disturbances, or discharge  ENMT:  No difficulty hearing, tinnitus, vertigo; No sinus or throat pain  NECK: No pain or stiffness  BREASTS: No pain, masses, or nipple discharge  RESPIRATORY: No cough, wheezing, chills or hemoptysis; No shortness of breath  CARDIOVASCULAR: No chest pain, palpitations, dizziness, or leg swelling  GASTROINTESTINAL: No abdominal or epigastric pain. No nausea, vomiting, or hematemesis; No diarrhea or constipation. No melena or hematochezia.  GENITOURINARY: No dysuria, frequency, hematuria, or incontinence  NEUROLOGICAL: No headaches, memory loss, loss of strength, numbness, or tremors  SKIN: No itching, burning, rashes, or lesions   LYMPH NODES: No enlarged glands  ENDOCRINE: No heat or cold intolerance; No hair loss  MUSCULOSKELETAL: No joint pain or swelling; No muscle, back, or extremity pain  PSYCHIATRIC: No depression, anxiety, mood swings, or difficulty sleeping  HEME/LYMPH: No easy bruising, or bleeding gums  ALLERGY AND IMMUNOLOGIC: No hives or eczema    MEDICATIONS  (STANDING):  amLODIPine   Tablet 5 milliGRAM(s) Oral daily  aspirin enteric coated 81 milliGRAM(s) Oral daily  cefTRIAXone   IVPB 1000 milliGRAM(s) IV Intermittent every 24 hours  cholecalciferol 1000 Unit(s) Oral daily  dextrose 5% + sodium chloride 0.45%. 1000 milliLiter(s) (50 mL/Hr) IV Continuous <Continuous>  escitalopram 10 milliGRAM(s) Oral daily  heparin   Injectable 5000 Unit(s) SubCutaneous every 12 hours  pantoprazole    Tablet 40 milliGRAM(s) Oral before breakfast  polyethylene glycol 3350 17 Gram(s) Oral daily    MEDICATIONS  (PRN):  acetaminophen   Tablet .. 650 milliGRAM(s) Oral every 6 hours PRN Temp greater or equal to 38C (100.4F), Mild Pain (1 - 3)  hydrALAZINE 10 milliGRAM(s) Oral four times a day PRN Systolic blood pressure >180  magnesium hydroxide Suspension 30 milliLiter(s) Oral daily PRN Constipation  melatonin 3 milliGRAM(s) Oral at bedtime PRN Insomnia      ALLERGIES: No Known Allergies      FAMILY HISTORY:      PHYSICAL EXAMINATION:  -----------------------------  T(C): 36.3 (11-29-20 @ 04:12), Max: 37.2 (11-28-20 @ 16:00)  HR: 74 (11-29-20 @ 04:12) (70 - 82)  BP: 153/76 (11-29-20 @ 04:12) (152/71 - 178/83)  RR: 17 (11-29-20 @ 04:12) (17 - 19)  SpO2: 95% (11-29-20 @ 04:12) (92% - 95%)  Wt(kg): --    11-28 @ 07:01  -  11-29 @ 07:00  --------------------------------------------------------  IN:  Total IN: 0 mL    OUT:    Voided (mL): 600 mL  Total OUT: 600 mL    Total NET: -600 mL            VITALS  T(C): 36.3 (11-29-20 @ 04:12), Max: 37.2 (11-28-20 @ 16:00)  HR: 74 (11-29-20 @ 04:12) (70 - 82)  BP: 153/76 (11-29-20 @ 04:12) (152/71 - 178/83)  RR: 17 (11-29-20 @ 04:12) (17 - 19)  SpO2: 95% (11-29-20 @ 04:12) (92% - 95%)    Constitutional: well developed, normal appearance, well groomed, well nourished, no deformities and no acute distress.   Eyes: the conjunctiva exhibited no abnormalities and the eyelids demonstrated no xanthelasmas.   HEENT: normal oral mucosa, no oral pallor and no oral cyanosis.   Neck: normal jugular venous A waves present, normal jugular venous V waves present and no jugular venous blake A waves.   Pulmonary: no respiratory distress, normal respiratory rhythm and effort, no accessory muscle use and lungs were clear to auscultation bilaterally.   Cardiovascular: heart rate and rhythm were normal, normal S1 and S2 and no murmur, gallop, rub, heave or thrill are present.   Abdomen: soft, non-tender, no hepato-splenomegaly and no abdominal mass palpated.   Musculoskeletal: the gait could not be assessed..   Extremities: no clubbing of the fingernails, no localized cyanosis, no petechial hemorrhages and no ischemic changes.   Skin: normal skin color and pigmentation, no rash, no venous stasis, no skin lesions, no skin ulcer and no xanthoma was observed.   Psychiatric: oriented to person, place, and time, the affect was normal, the mood was normal and not feeling anxious.     LABS:   --------  11-28    141  |  106  |  11  ----------------------------<  110<H>  3.2<L>   |  27  |  0.84    Ca    8.4<L>      28 Nov 2020 05:40    TPro  7.3  /  Alb  2.9<L>  /  TBili  0.4  /  DBili  x   /  AST  17  /  ALT  16  /  AlkPhos  60  11-28                         14.1   11.45 )-----------( 344      ( 29 Nov 2020 08:50 )             41.7     PT/INR - ( 27 Nov 2020 12:44 )   PT: 13.3 sec;   INR: 1.14 ratio         PTT - ( 27 Nov 2020 12:44 )  PTT:32.2 sec    11-28 @ 05:40 CPK total:--, CKMB --, Troponin I - .167 ng/mL<H>  11-27 @ 12:44 CPK total:--, CKMB --, Troponin I - <.015 ng/mL    233 mg/dL<H>, --, 29 mg/dL<L>, 143 mg/dL    Culture Results:   >100,000 CFU/ml Escherichia coli (11-27 @ 18:31)  Culture Results:   No growth to date. (11-27 @ 18:29)  Culture Results:   No growth to date. (11-27 @ 18:29)      RADIOLOGY:  -----------------    ECG:     ECHO:

## 2020-11-29 NOTE — PROGRESS NOTE ADULT - SUBJECTIVE AND OBJECTIVE BOX
Date/Time Patient Seen:  		  Referring MD:   Data Reviewed	       Patient is a 89y old  Female who presents with a chief complaint of ALTERED MENTAL STATUS (28 Nov 2020 19:09)      Subjective/HPI     PAST MEDICAL & SURGICAL HISTORY:  Constipation    HLD (hyperlipidemia)    Depression    HTN (hypertension)    Cognitive deficits    Weakness    Anemia    TIA (transient ischemic attack)          Medication list         MEDICATIONS  (STANDING):  amLODIPine   Tablet 5 milliGRAM(s) Oral daily  aspirin enteric coated 81 milliGRAM(s) Oral daily  cefTRIAXone   IVPB 1000 milliGRAM(s) IV Intermittent every 24 hours  cholecalciferol 1000 Unit(s) Oral daily  dextrose 5% + sodium chloride 0.45%. 1000 milliLiter(s) (50 mL/Hr) IV Continuous <Continuous>  escitalopram 10 milliGRAM(s) Oral daily  heparin   Injectable 5000 Unit(s) SubCutaneous every 12 hours  pantoprazole    Tablet 40 milliGRAM(s) Oral before breakfast  polyethylene glycol 3350 17 Gram(s) Oral daily    MEDICATIONS  (PRN):  acetaminophen   Tablet .. 650 milliGRAM(s) Oral every 6 hours PRN Temp greater or equal to 38C (100.4F), Mild Pain (1 - 3)  hydrALAZINE 10 milliGRAM(s) Oral four times a day PRN Systolic blood pressure >180  magnesium hydroxide Suspension 30 milliLiter(s) Oral daily PRN Constipation  melatonin 3 milliGRAM(s) Oral at bedtime PRN Insomnia         Vitals log        ICU Vital Signs Last 24 Hrs  T(C): 36.3 (29 Nov 2020 04:12), Max: 37.2 (28 Nov 2020 16:00)  T(F): 97.4 (29 Nov 2020 04:12), Max: 99 (28 Nov 2020 16:00)  HR: 74 (29 Nov 2020 04:12) (70 - 82)  BP: 153/76 (29 Nov 2020 04:12) (152/71 - 178/83)  BP(mean): --  ABP: --  ABP(mean): --  RR: 17 (29 Nov 2020 04:12) (17 - 19)  SpO2: 95% (29 Nov 2020 04:12) (92% - 95%)           Input and Output:  I&O's Detail    28 Nov 2020 07:01  -  29 Nov 2020 07:00  --------------------------------------------------------  IN:  Total IN: 0 mL    OUT:    Voided (mL): 600 mL  Total OUT: 600 mL    Total NET: -600 mL          Lab Data                        13.5   13.58 )-----------( 341      ( 28 Nov 2020 05:40 )             40.4     11-28    141  |  106  |  11  ----------------------------<  110<H>  3.2<L>   |  27  |  0.84    Ca    8.4<L>      28 Nov 2020 05:40    TPro  7.3  /  Alb  2.9<L>  /  TBili  0.4  /  DBili  x   /  AST  17  /  ALT  16  /  AlkPhos  60  11-28      CARDIAC MARKERS ( 28 Nov 2020 05:40 )  .167 ng/mL / x     / x     / x     / x      CARDIAC MARKERS ( 27 Nov 2020 12:44 )  <.015 ng/mL / x     / x     / x     / x            Review of Systems	      Objective     Physical Examination    heart s1s2  lung dec BS  abd soft      Pertinent Lab findings & Imaging      Benny:  NO   Adequate UO     I&O's Detail    28 Nov 2020 07:01  -  29 Nov 2020 07:00  --------------------------------------------------------  IN:  Total IN: 0 mL    OUT:    Voided (mL): 600 mL  Total OUT: 600 mL    Total NET: -600 mL               Discussed with:     Cultures:	        Radiology

## 2020-11-29 NOTE — PHYSICAL THERAPY INITIAL EVALUATION ADULT - ADDITIONAL COMMENTS
Pt came from [facility], PLOF unknown, pt poor historian secondary to being lethargic/confusion, cognitive hx.

## 2020-11-29 NOTE — PROGRESS NOTE ADULT - SUBJECTIVE AND OBJECTIVE BOX
Interval History:    CENTRAL LINE:   [  ] YES       [  ] NO  OLMOS:                 [  ] YES       [  ] NO         REVIEW OF SYSTEMS:  All Systems below were reviewed and are negative [  ]  HEENT:  ID:  Pulmonary:  Cardiac:  GI:  Renal:  Musculoskeletal:  All other systems above were reviewed and are negative   [  ]      MEDICATIONS  (STANDING):  amLODIPine   Tablet 5 milliGRAM(s) Oral daily  aspirin enteric coated 81 milliGRAM(s) Oral daily  cefTRIAXone   IVPB 1000 milliGRAM(s) IV Intermittent every 24 hours  cholecalciferol 1000 Unit(s) Oral daily  dextrose 5% + sodium chloride 0.45%. 1000 milliLiter(s) (50 mL/Hr) IV Continuous <Continuous>  escitalopram 10 milliGRAM(s) Oral daily  heparin   Injectable 5000 Unit(s) SubCutaneous every 12 hours  pantoprazole    Tablet 40 milliGRAM(s) Oral before breakfast  polyethylene glycol 3350 17 Gram(s) Oral daily    MEDICATIONS  (PRN):  acetaminophen   Tablet .. 650 milliGRAM(s) Oral every 6 hours PRN Temp greater or equal to 38C (100.4F), Mild Pain (1 - 3)  hydrALAZINE 10 milliGRAM(s) Oral four times a day PRN Systolic blood pressure >180  magnesium hydroxide Suspension 30 milliLiter(s) Oral daily PRN Constipation  melatonin 3 milliGRAM(s) Oral at bedtime PRN Insomnia      Vital Signs Last 24 Hrs  T(C): 37.2 (29 Nov 2020 14:25), Max: 37.2 (29 Nov 2020 14:25)  T(F): 98.9 (29 Nov 2020 14:25), Max: 98.9 (29 Nov 2020 14:25)  HR: 75 (29 Nov 2020 14:25) (70 - 82)  BP: 183/67 (29 Nov 2020 14:25) (153/76 - 183/67)  BP(mean): --  RR: 18 (29 Nov 2020 14:25) (17 - 19)  SpO2: 90% (29 Nov 2020 14:25) (90% - 95%)    I&O's Summary    28 Nov 2020 07:01  -  29 Nov 2020 07:00  --------------------------------------------------------  IN: 0 mL / OUT: 600 mL / NET: -600 mL        PHYSICAL EXAM:  HEENT: NC/AT; PERRLA  Neck: Soft; no tenderness  Lungs: CTA bilaterally; no wheezing.   Heart:  Abdomen:  Genital/ Rectal:  Extremities:  Neurologic:  Vascular:      LABORATORY:    CBC Full  -  ( 29 Nov 2020 08:50 )  WBC Count : 11.45 K/uL  RBC Count : 4.66 M/uL  Hemoglobin : 14.1 g/dL  Hematocrit : 41.7 %  Platelet Count - Automated : 344 K/uL  Mean Cell Volume : 89.5 fl  Mean Cell Hemoglobin : 30.3 pg  Mean Cell Hemoglobin Concentration : 33.8 gm/dL  Auto Neutrophil # : x  Auto Lymphocyte # : x  Auto Monocyte # : x  Auto Eosinophil # : x  Auto Basophil # : x  Auto Neutrophil % : x  Auto Lymphocyte % : x  Auto Monocyte % : x  Auto Eosinophil % : x  Auto Basophil % : x      ESR:                   11-28 @ 05:40  --    C-Reactive Protein:     11-28 @ 05:40  --    Procalcitonin:           11-28 @ 05:40   <0.05  ESR:                   11-27 @ 12:44  --    C-Reactive Protein:     11-27 @ 12:44  --    Procalcitonin:           11-27 @ 12:44   <0.05      11-28    141  |  106  |  11  ----------------------------<  110<H>  3.2<L>   |  27  |  0.84    Ca    8.4<L>      28 Nov 2020 05:40    TPro  7.3  /  Alb  2.9<L>  /  TBili  0.4  /  DBili  x   /  AST  17  /  ALT  16  /  AlkPhos  60  11-28          Assessment and Plan:          Wayne Wall MD   [phone].   She is afebrile  Comfortable      MEDICATIONS  (STANDING):  amLODIPine   Tablet 5 milliGRAM(s) Oral daily  aspirin enteric coated 81 milliGRAM(s) Oral daily  cefTRIAXone   IVPB 1000 milliGRAM(s) IV Intermittent every 24 hours  cholecalciferol 1000 Unit(s) Oral daily  dextrose 5% + sodium chloride 0.45%. 1000 milliLiter(s) (50 mL/Hr) IV Continuous <Continuous>  escitalopram 10 milliGRAM(s) Oral daily  heparin   Injectable 5000 Unit(s) SubCutaneous every 12 hours  pantoprazole    Tablet 40 milliGRAM(s) Oral before breakfast  polyethylene glycol 3350 17 Gram(s) Oral daily    MEDICATIONS  (PRN):  acetaminophen   Tablet .. 650 milliGRAM(s) Oral every 6 hours PRN Temp greater or equal to 38C (100.4F), Mild Pain (1 - 3)  hydrALAZINE 10 milliGRAM(s) Oral four times a day PRN Systolic blood pressure >180  magnesium hydroxide Suspension 30 milliLiter(s) Oral daily PRN Constipation  melatonin 3 milliGRAM(s) Oral at bedtime PRN Insomnia      Vital Signs Last 24 Hrs  T(C): 37.2 (29 Nov 2020 14:25), Max: 37.2 (29 Nov 2020 14:25)  T(F): 98.9 (29 Nov 2020 14:25), Max: 98.9 (29 Nov 2020 14:25)  HR: 75 (29 Nov 2020 14:25) (70 - 82)  BP: 183/67 (29 Nov 2020 14:25) (153/76 - 183/67)  BP(mean): --  RR: 18 (29 Nov 2020 14:25) (17 - 19)  SpO2: 90% (29 Nov 2020 14:25) (90% - 95%)    I&O's Summary    28 Nov 2020 07:01  -  29 Nov 2020 07:00  --------------------------------------------------------  IN: 0 mL / OUT: 600 mL / NET: -600 mL        PHYSICAL EXAM:  HEENT: NC/AT; PERRLA  Neck: Soft; no tenderness  Lungs: CTA bilaterally; no wheezing.   Heart:  Abdomen:  Genital/ Rectal:  Extremities:  Neurologic:  Vascular:      LABORATORY:    CBC Full  -  ( 29 Nov 2020 08:50 )  WBC Count : 11.45 K/uL  RBC Count : 4.66 M/uL  Hemoglobin : 14.1 g/dL  Hematocrit : 41.7 %  Platelet Count - Automated : 344 K/uL  Mean Cell Volume : 89.5 fl  Mean Cell Hemoglobin : 30.3 pg  Mean Cell Hemoglobin Concentration : 33.8 gm/dL  Auto Neutrophil # : x  Auto Lymphocyte # : x  Auto Monocyte # : x  Auto Eosinophil # : x  Auto Basophil # : x  Auto Neutrophil % : x  Auto Lymphocyte % : x  Auto Monocyte % : x  Auto Eosinophil % : x  Auto Basophil % : x      ESR:                   11-28 @ 05:40  --    C-Reactive Protein:     11-28 @ 05:40  --    Procalcitonin:           11-28 @ 05:40   <0.05  ESR:                   11-27 @ 12:44  --    C-Reactive Protein:     11-27 @ 12:44  --    Procalcitonin:           11-27 @ 12:44   <0.05      11-28    141  |  106  |  11  ----------------------------<  110<H>  3.2<L>   |  27  |  0.84    Ca    8.4<L>      28 Nov 2020 05:40    TPro  7.3  /  Alb  2.9<L>  /  TBili  0.4  /  DBili  x   /  AST  17  /  ALT  16  /  AlkPhos  60  11-28    Assessment and Plan:    2. Toxic metabolic encephalopathy.  3. Weakness.  4. Leukocytosis.    . Urine culture is growing E coli.   . Change Rocephin to po Vantin.            Wayne Wall MD   [phone].   She is afebrile  Comfortable      MEDICATIONS  (STANDING):  amLODIPine   Tablet 5 milliGRAM(s) Oral daily  aspirin enteric coated 81 milliGRAM(s) Oral daily  cefTRIAXone   IVPB 1000 milliGRAM(s) IV Intermittent every 24 hours  cholecalciferol 1000 Unit(s) Oral daily  dextrose 5% + sodium chloride 0.45%. 1000 milliLiter(s) (50 mL/Hr) IV Continuous <Continuous>  escitalopram 10 milliGRAM(s) Oral daily  heparin   Injectable 5000 Unit(s) SubCutaneous every 12 hours  pantoprazole    Tablet 40 milliGRAM(s) Oral before breakfast  polyethylene glycol 3350 17 Gram(s) Oral daily    MEDICATIONS  (PRN):  acetaminophen   Tablet .. 650 milliGRAM(s) Oral every 6 hours PRN Temp greater or equal to 38C (100.4F), Mild Pain (1 - 3)  hydrALAZINE 10 milliGRAM(s) Oral four times a day PRN Systolic blood pressure >180  magnesium hydroxide Suspension 30 milliLiter(s) Oral daily PRN Constipation  melatonin 3 milliGRAM(s) Oral at bedtime PRN Insomnia      Vital Signs Last 24 Hrs  T(C): 37.2 (29 Nov 2020 14:25), Max: 37.2 (29 Nov 2020 14:25)  T(F): 98.9 (29 Nov 2020 14:25), Max: 98.9 (29 Nov 2020 14:25)  HR: 75 (29 Nov 2020 14:25) (70 - 82)  BP: 183/67 (29 Nov 2020 14:25) (153/76 - 183/67)  BP(mean): --  RR: 18 (29 Nov 2020 14:25) (17 - 19)  SpO2: 90% (29 Nov 2020 14:25) (90% - 95%)    I&O's Summary    28 Nov 2020 07:01  -  29 Nov 2020 07:00  --------------------------------------------------------  IN: 0 mL / OUT: 600 mL / NET: -600 mL    PHYSICAL EXAM:  HEENT: NC/AT; PERRLA  Neck: Soft; no tenderness  Lungs: CTA bilaterally; no wheezing.   Heart: RRR; no murmurs.  Abdomen: Soft; no tenderness.   Genital/ Rectal: No garcia   Extremities: No ulcers.   Neurologic: Awake.       LABORATORY:    CBC Full  -  ( 29 Nov 2020 08:50 )  WBC Count : 11.45 K/uL  RBC Count : 4.66 M/uL  Hemoglobin : 14.1 g/dL  Hematocrit : 41.7 %  Platelet Count - Automated : 344 K/uL  Mean Cell Volume : 89.5 fl  Mean Cell Hemoglobin : 30.3 pg  Mean Cell Hemoglobin Concentration : 33.8 gm/dL  Auto Neutrophil # : x  Auto Lymphocyte # : x  Auto Monocyte # : x  Auto Eosinophil # : x  Auto Basophil # : x  Auto Neutrophil % : x  Auto Lymphocyte % : x  Auto Monocyte % : x  Auto Eosinophil % : x  Auto Basophil % : x      ESR:                   11-28 @ 05:40  --    C-Reactive Protein:     11-28 @ 05:40  --    Procalcitonin:           11-28 @ 05:40   <0.05  ESR:                   11-27 @ 12:44  --    C-Reactive Protein:     11-27 @ 12:44  --    Procalcitonin:           11-27 @ 12:44   <0.05      11-28    141  |  106  |  11  ----------------------------<  110<H>  3.2<L>   |  27  |  0.84    Ca    8.4<L>      28 Nov 2020 05:40    TPro  7.3  /  Alb  2.9<L>  /  TBili  0.4  /  DBili  x   /  AST  17  /  ALT  16  /  AlkPhos  60  11-28    Assessment and Plan:    2. Toxic metabolic encephalopathy.  3. Weakness.  4. Leukocytosis.    . Urine culture is growing E coli.   . Change Rocephin to po Vantin 200 mg daily for 4 days.  . Discharge planning,       Wayne Wall MD   [phone].

## 2020-11-29 NOTE — PHYSICAL THERAPY INITIAL EVALUATION ADULT - PERTINENT HX OF CURRENT PROBLEM, REHAB EVAL
Pt is 89 y.o. [facility] resident brought to ER with AMS, weakness, found to have UTI and admitted for septic workup.

## 2020-11-29 NOTE — PHYSICAL THERAPY INITIAL EVALUATION ADULT - CRITERIA FOR SKILLED THERAPEUTIC INTERVENTIONS
therapy frequency/anticipated equipment needs at discharge/anticipated discharge recommendation/predicted duration of therapy intervention/rehab potential/impairments found/risk reduction/prevention/functional limitations in following categories

## 2020-11-29 NOTE — PROGRESS NOTE ADULT - SUBJECTIVE AND OBJECTIVE BOX
Neurology follow up note    TASH [id number]      Interval History:    Patient more awake in bed     MEDICATIONS    acetaminophen   Tablet .. 650 milliGRAM(s) Oral every 6 hours PRN  amLODIPine   Tablet 5 milliGRAM(s) Oral daily  aspirin enteric coated 81 milliGRAM(s) Oral daily  cefTRIAXone   IVPB 1000 milliGRAM(s) IV Intermittent every 24 hours  cholecalciferol 1000 Unit(s) Oral daily  dextrose 5% + sodium chloride 0.45%. 1000 milliLiter(s) IV Continuous <Continuous>  escitalopram 10 milliGRAM(s) Oral daily  heparin   Injectable 5000 Unit(s) SubCutaneous every 12 hours  hydrALAZINE 10 milliGRAM(s) Oral four times a day PRN  magnesium hydroxide Suspension 30 milliLiter(s) Oral daily PRN  melatonin 3 milliGRAM(s) Oral at bedtime PRN  pantoprazole    Tablet 40 milliGRAM(s) Oral before breakfast  polyethylene glycol 3350 17 Gram(s) Oral daily      Allergies    No Known Allergies    Intolerances            Vital Signs Last 24 Hrs  T(C): 36.3 (2020 04:12), Max: 37.2 (2020 16:00)  T(F): 97.4 (2020 04:12), Max: 99 (2020 16:00)  HR: 74 (2020 04:12) (70 - 82)  BP: 153/76 (2020 04:12) (153/76 - 178/83)  BP(mean): --  RR: 17 (2020 04:12) (17 - 19)  SpO2: 95% (2020 04:12) (92% - 95%)        REVIEW OF SYSTEMS:  Very limited secondary to the patient has underlying dementia, is lethargic, but presented to the hospital secondary to decreased responsiveness and mental status       PHYSICAL EXAMINATION:  HEENT:  Head:  Normocephalic, atraumatic.  Eyes:  No scleral icterus.  Ears:  Hard to evaluate secondary to the patient to be lethargic.  NECK:  Supple.  RESPIRATORY:  Decreased breath sounds bilaterally, but gives poor effort.  CARDIOVASCULAR:  S1 and S2 heard.  ABDOMEN:  Soft and nontender.  EXTREMITIES:  No clubbing or cyanosis were noted.      NEUROLOGIC:  The patient was awake, the patient does keep her eyes closed, had difficulty trying to open them.  Speech:  The patient would articulate one to two words.  No dysarthria.  Motor examination is very limited secondary to the patient's uncooperative examination.  Attempted to pull up bilateral upper extremities, the patient would actively resist, pull both arms down, would say overall 3/5, bilateral lower extremities with plantar stimulation, positive flexation at the hip and knee, would say 3/5, did not notice any focal gross asymmetric movements.          LABS:  CBC Full  -  ( 2020 08:50 )  WBC Count : 11.45 K/uL  RBC Count : 4.66 M/uL  Hemoglobin : 14.1 g/dL  Hematocrit : 41.7 %  Platelet Count - Automated : 344 K/uL  Mean Cell Volume : 89.5 fl  Mean Cell Hemoglobin : 30.3 pg  Mean Cell Hemoglobin Concentration : 33.8 gm/dL  Auto Neutrophil # : x  Auto Lymphocyte # : x  Auto Monocyte # : x  Auto Eosinophil # : x  Auto Basophil # : x  Auto Neutrophil % : x  Auto Lymphocyte % : x  Auto Monocyte % : x  Auto Eosinophil % : x  Auto Basophil % : x    Urinalysis Basic - ( 2020 12:44 )    Color: Yellow / Appearance: Slightly Turbid / S.015 / pH: x  Gluc: x / Ketone: Negative  / Bili: Negative / Urobili: Negative   Blood: x / Protein: 15 / Nitrite: Positive   Leuk Esterase: Small / RBC: 0-2 /HPF / WBC 6-10   Sq Epi: x / Non Sq Epi: Occasional / Bacteria: Many          141  |  106  |  11  ----------------------------<  110<H>  3.2<L>   |  27  |  0.84    Ca    8.4<L>      2020 05:40    TPro  7.3  /  Alb  2.9<L>  /  TBili  0.4  /  DBili  x   /  AST  17  /  ALT  16  /  AlkPhos  60  -    Hemoglobin A1C:     LIVER FUNCTIONS - ( 2020 05:40 )  Alb: 2.9 g/dL / Pro: 7.3 g/dL / ALK PHOS: 60 U/L / ALT: 16 U/L / AST: 17 U/L / GGT: x           Vitamin B12 Vitamin B12, Serum: 706 pg/mL ( @ 11:22)    PT/INR - ( 2020 12:44 )   PT: 13.3 sec;   INR: 1.14 ratio         PTT - ( 2020 12:44 )  PTT:32.2 sec      RADIOLOGY        ANALYSIS AND PLAN:  A 89-year-old with an episode of altered mental status.  For episode of altered mental status, suspect most likely more of metabolic encephalopathy, possibly underlying urinary tract infection.  I would recommend to rule out other causes of metabolic encephalopathy.  The examination is limited but there is no clear signs to suggest a new cerebrovascular accident has ensued, and a small lacunar thalamic infarct will not cause the patient being lethargic and change in mental status.  infectious workup as needed.  Antibiotics as needed.  For history of dementia, secondary to the patient's age, would recommend supportive therapy.  For history of hypertension, monitor systolic blood pressure.  For history of high cholesterol, would check lipid panel.  Would recommend a low-dose statin.  For history of TIAs in the past, would recommend to continue the patient on antiplatelets.  The patient's cognitive status for dementia was evaluated to the best of my ability.  Son's name is Wayne, telephone number is [phone], alternate number is [phone] 2020    Greater than 45 minutes of time was spent with the patient, plan of care, reviewing data, speaking to the family and  50% of the visit was spent counseling and/or coordinating care with multidisciplinary healthcare team

## 2020-11-30 DIAGNOSIS — R13.10 DYSPHAGIA, UNSPECIFIED: ICD-10-CM

## 2020-11-30 DIAGNOSIS — E87.6 HYPOKALEMIA: ICD-10-CM

## 2020-11-30 LAB
ANION GAP SERPL CALC-SCNC: 8 MMOL/L — SIGNIFICANT CHANGE UP (ref 5–17)
BUN SERPL-MCNC: 9 MG/DL — SIGNIFICANT CHANGE UP (ref 7–23)
CALCIUM SERPL-MCNC: 8.9 MG/DL — SIGNIFICANT CHANGE UP (ref 8.5–10.1)
CHLORIDE SERPL-SCNC: 105 MMOL/L — SIGNIFICANT CHANGE UP (ref 96–108)
CO2 SERPL-SCNC: 29 MMOL/L — SIGNIFICANT CHANGE UP (ref 22–31)
CREAT SERPL-MCNC: 1.1 MG/DL — SIGNIFICANT CHANGE UP (ref 0.5–1.3)
GLUCOSE SERPL-MCNC: 118 MG/DL — HIGH (ref 70–99)
HCT VFR BLD CALC: 39.2 % — SIGNIFICANT CHANGE UP (ref 34.5–45)
HGB BLD-MCNC: 13.1 G/DL — SIGNIFICANT CHANGE UP (ref 11.5–15.5)
MCHC RBC-ENTMCNC: 30.1 PG — SIGNIFICANT CHANGE UP (ref 27–34)
MCHC RBC-ENTMCNC: 33.4 GM/DL — SIGNIFICANT CHANGE UP (ref 32–36)
MCV RBC AUTO: 90.1 FL — SIGNIFICANT CHANGE UP (ref 80–100)
NRBC # BLD: 0 /100 WBCS — SIGNIFICANT CHANGE UP (ref 0–0)
PLATELET # BLD AUTO: 370 K/UL — SIGNIFICANT CHANGE UP (ref 150–400)
POTASSIUM SERPL-MCNC: 3.1 MMOL/L — LOW (ref 3.5–5.3)
POTASSIUM SERPL-SCNC: 3.1 MMOL/L — LOW (ref 3.5–5.3)
RBC # BLD: 4.35 M/UL — SIGNIFICANT CHANGE UP (ref 3.8–5.2)
RBC # FLD: 13.5 % — SIGNIFICANT CHANGE UP (ref 10.3–14.5)
SODIUM SERPL-SCNC: 142 MMOL/L — SIGNIFICANT CHANGE UP (ref 135–145)
WBC # BLD: 13.47 K/UL — HIGH (ref 3.8–10.5)
WBC # FLD AUTO: 13.47 K/UL — HIGH (ref 3.8–10.5)

## 2020-11-30 PROCEDURE — 70544 MR ANGIOGRAPHY HEAD W/O DYE: CPT | Mod: 26,59

## 2020-11-30 PROCEDURE — 70551 MRI BRAIN STEM W/O DYE: CPT | Mod: 26

## 2020-11-30 RX ORDER — POTASSIUM CHLORIDE 20 MEQ
40 PACKET (EA) ORAL ONCE
Refills: 0 | Status: COMPLETED | OUTPATIENT
Start: 2020-11-30 | End: 2020-11-30

## 2020-11-30 RX ORDER — POTASSIUM CHLORIDE 20 MEQ
10 PACKET (EA) ORAL
Refills: 0 | Status: COMPLETED | OUTPATIENT
Start: 2020-11-30 | End: 2020-11-30

## 2020-11-30 RX ADMIN — Medication 40 MILLIEQUIVALENT(S): at 08:41

## 2020-11-30 RX ADMIN — Medication 81 MILLIGRAM(S): at 12:37

## 2020-11-30 RX ADMIN — Medication 10 MILLIGRAM(S): at 19:27

## 2020-11-30 RX ADMIN — Medication 200 MILLIGRAM(S): at 05:00

## 2020-11-30 RX ADMIN — Medication 200 MILLIGRAM(S): at 17:49

## 2020-11-30 RX ADMIN — HEPARIN SODIUM 5000 UNIT(S): 5000 INJECTION INTRAVENOUS; SUBCUTANEOUS at 05:02

## 2020-11-30 RX ADMIN — AMLODIPINE BESYLATE 5 MILLIGRAM(S): 2.5 TABLET ORAL at 05:00

## 2020-11-30 RX ADMIN — ESCITALOPRAM OXALATE 10 MILLIGRAM(S): 10 TABLET, FILM COATED ORAL at 12:36

## 2020-11-30 RX ADMIN — POLYETHYLENE GLYCOL 3350 17 GRAM(S): 17 POWDER, FOR SOLUTION ORAL at 12:36

## 2020-11-30 RX ADMIN — Medication 1000 UNIT(S): at 12:36

## 2020-11-30 RX ADMIN — HEPARIN SODIUM 5000 UNIT(S): 5000 INJECTION INTRAVENOUS; SUBCUTANEOUS at 17:49

## 2020-11-30 RX ADMIN — PANTOPRAZOLE SODIUM 40 MILLIGRAM(S): 20 TABLET, DELAYED RELEASE ORAL at 05:00

## 2020-11-30 RX ADMIN — Medication 100 MILLIEQUIVALENT(S): at 10:42

## 2020-11-30 RX ADMIN — Medication 100 MILLIEQUIVALENT(S): at 08:41

## 2020-11-30 RX ADMIN — Medication 10 MILLIGRAM(S): at 05:00

## 2020-11-30 RX ADMIN — Medication 100 MILLIEQUIVALENT(S): at 12:35

## 2020-11-30 NOTE — PROGRESS NOTE ADULT - PROBLEM SELECTOR PLAN 1
LIKELY 2/2 TO ACUTE METABOLIC ENCEPHALOPATHY SECONDARY TO UTI ,POA .PROGRESSION /ONSET OF DEMENTIA MAY BE CONTRIBUTING FACTOR .NEUROLOGY CONS REQUESTED TO RULE OUT ACUTE CVA AND MRI IS PENDING

## 2020-11-30 NOTE — PROGRESS NOTE ADULT - ASSESSMENT
89 YO F ,[place] [facility] resident was brought to ER with altered mental status and weakness ,found to have probable UTI Admitted for septic workup and evaluation,send blood and urine cx,serial lactate levels,monitor vitals closley,ivfs hydration,monitor urine output and renal profile,iv abx initiated -started on ceftriaxone in ER and ID CONS requested .CT Brain was done and showed Volume loss, microvascular disease, age indeterminate lacunar infarct including  left ventral medial thalamus, MR sensitive for new ischemia. No acute hemorrhage or midline shift. Right ostiomeatal complex obstruction with right frontal, ethmoid and maxillary mucoceles right maxillary bony hyperostosis,, mass effect as polyp or inverting papilloma not excluded,report was discussed with neurologist and no new interventions recommended ,continue home rx .Patient;s past med hx is significant for Anemia  ,Cognitive deficits  ,constipation ,Depression  ,HLD (hyperlipidemia)  ,HTN (hypertension)  ,TIA (transient ischemic attack)  .Found to have BP elevation and cardiology consult requested for comanagement Admitted  to telemetry unit for monitoring , send 3 sets of cardiac enzymes to rule out acute coronary event, obtain ECHO to evaluate LVEF, cardiology consult  ,continue current management, O2 supply, anticoagulation plan as per cardiology consult Palliative care consult requested ,to discuss advance directives and complete MOLST

## 2020-11-30 NOTE — PROGRESS NOTE ADULT - PROBLEM SELECTOR PLAN 1
89 YO F ,[place] [facility] resident was brought to ER with altered mental status and weakness ,found to have probable UTI Admitted for septic workup   on Dysphagia Diet - asp prec - HOB Elev - oral hygiene  on ABX - for poss UTI - cx noted - UA noted - labs reviewed -   SLP eval  - Dysphagia II diet - HOB elev - oral hygiene -   on gentle hydration at present  GOC discussion - spoke with Son - three children all together - Son who lives in [place] does not know if mom is DNR - he will speak with his brothers.   TTE report reviewed -

## 2020-11-30 NOTE — PROGRESS NOTE ADULT - SUBJECTIVE AND OBJECTIVE BOX
Interval History:    CENTRAL LINE:   [  ] YES       [  ] NO  OLMOS:                 [  ] YES       [  ] NO         REVIEW OF SYSTEMS:  All Systems below were reviewed and are negative [  ]  HEENT:  ID:  Pulmonary:  Cardiac:  GI:  Renal:  Musculoskeletal:  All other systems above were reviewed and are negative   [  ]      MEDICATIONS  (STANDING):  amLODIPine   Tablet 5 milliGRAM(s) Oral daily  aspirin enteric coated 81 milliGRAM(s) Oral daily  cefpodoxime 200 milliGRAM(s) Oral every 12 hours  cholecalciferol 1000 Unit(s) Oral daily  dextrose 5% + sodium chloride 0.45%. 1000 milliLiter(s) (50 mL/Hr) IV Continuous <Continuous>  escitalopram 10 milliGRAM(s) Oral daily  heparin   Injectable 5000 Unit(s) SubCutaneous every 12 hours  pantoprazole    Tablet 40 milliGRAM(s) Oral before breakfast  polyethylene glycol 3350 17 Gram(s) Oral daily    MEDICATIONS  (PRN):  acetaminophen   Tablet .. 650 milliGRAM(s) Oral every 6 hours PRN Temp greater or equal to 38C (100.4F), Mild Pain (1 - 3)  hydrALAZINE 10 milliGRAM(s) Oral four times a day PRN Systolic blood pressure >180  magnesium hydroxide Suspension 30 milliLiter(s) Oral daily PRN Constipation  melatonin 3 milliGRAM(s) Oral at bedtime PRN Insomnia      Vital Signs Last 24 Hrs  T(C): 36.4 (30 Nov 2020 16:17), Max: 37 (30 Nov 2020 04:10)  T(F): 97.6 (30 Nov 2020 16:17), Max: 98.6 (30 Nov 2020 04:10)  HR: 72 (30 Nov 2020 16:17) (67 - 78)  BP: 177/85 (30 Nov 2020 19:26) (119/73 - 190/80)  BP(mean): --  RR: 18 (30 Nov 2020 16:17) (14 - 19)  SpO2: 93% (30 Nov 2020 16:17) (91% - 96%)    I&O's Summary    29 Nov 2020 07:01  -  30 Nov 2020 07:00  --------------------------------------------------------  IN: 450 mL / OUT: 400 mL / NET: 50 mL    30 Nov 2020 07:01  -  30 Nov 2020 20:09  --------------------------------------------------------  IN: 120 mL / OUT: 0 mL / NET: 120 mL        PHYSICAL EXAM:  HEENT: NC/AT; PERRLA  Neck: Soft; no tenderness  Lungs: CTA bilaterally; no wheezing.   Heart:  Abdomen:  Genital/ Rectal:  Extremities:  Neurologic:  Vascular:      LABORATORY:    CBC Full  -  ( 30 Nov 2020 06:05 )  WBC Count : 13.47 K/uL  RBC Count : 4.35 M/uL  Hemoglobin : 13.1 g/dL  Hematocrit : 39.2 %  Platelet Count - Automated : 370 K/uL  Mean Cell Volume : 90.1 fl  Mean Cell Hemoglobin : 30.1 pg  Mean Cell Hemoglobin Concentration : 33.4 gm/dL  Auto Neutrophil # : x  Auto Lymphocyte # : x  Auto Monocyte # : x  Auto Eosinophil # : x  Auto Basophil # : x  Auto Neutrophil % : x  Auto Lymphocyte % : x  Auto Monocyte % : x  Auto Eosinophil % : x  Auto Basophil % : x      ESR:                   11-28 @ 05:40  --    C-Reactive Protein:     11-28 @ 05:40  --    Procalcitonin:           11-28 @ 05:40   <0.05  ESR:                   11-27 @ 12:44  --    C-Reactive Protein:     11-27 @ 12:44  --    Procalcitonin:           11-27 @ 12:44   <0.05      11-30    142  |  105  |  9   ----------------------------<  118<H>  3.1<L>   |  29  |  1.10    Ca    8.9      30 Nov 2020 06:05            Assessment and Plan:          Wayne Wall MD   [phone]. She is afebrile  Comfortable    MEDICATIONS  (STANDING):  amLODIPine   Tablet 5 milliGRAM(s) Oral daily  aspirin enteric coated 81 milliGRAM(s) Oral daily  cefpodoxime 200 milliGRAM(s) Oral every 12 hours  cholecalciferol 1000 Unit(s) Oral daily  dextrose 5% + sodium chloride 0.45%. 1000 milliLiter(s) (50 mL/Hr) IV Continuous <Continuous>  escitalopram 10 milliGRAM(s) Oral daily  heparin   Injectable 5000 Unit(s) SubCutaneous every 12 hours  pantoprazole    Tablet 40 milliGRAM(s) Oral before breakfast  polyethylene glycol 3350 17 Gram(s) Oral daily    MEDICATIONS  (PRN):  acetaminophen   Tablet .. 650 milliGRAM(s) Oral every 6 hours PRN Temp greater or equal to 38C (100.4F), Mild Pain (1 - 3)  hydrALAZINE 10 milliGRAM(s) Oral four times a day PRN Systolic blood pressure >180  magnesium hydroxide Suspension 30 milliLiter(s) Oral daily PRN Constipation  melatonin 3 milliGRAM(s) Oral at bedtime PRN Insomnia      Vital Signs Last 24 Hrs  T(C): 36.4 (30 Nov 2020 16:17), Max: 37 (30 Nov 2020 04:10)  T(F): 97.6 (30 Nov 2020 16:17), Max: 98.6 (30 Nov 2020 04:10)  HR: 72 (30 Nov 2020 16:17) (67 - 78)  BP: 177/85 (30 Nov 2020 19:26) (119/73 - 190/80)  BP(mean): --  RR: 18 (30 Nov 2020 16:17) (14 - 19)  SpO2: 93% (30 Nov 2020 16:17) (91% - 96%)    I&O's Summary    29 Nov 2020 07:01  -  30 Nov 2020 07:00  --------------------------------------------------------  IN: 450 mL / OUT: 400 mL / NET: 50 mL    30 Nov 2020 07:01  -  30 Nov 2020 20:09  --------------------------------------------------------  IN: 120 mL / OUT: 0 mL / NET: 120 mL    PHYSICAL EXAM:  HEENT: NC/AT; PERRLA  Neck: Soft; no tenderness  Lungs: Coarse BS bilaterally; no wheezing.   Heart: RRR; no murmurs  Abdomen: Soft; no tenderness.   Extremities: No ulcers  Neurologic: Awake.       LABORATORY:    CBC Full  -  ( 30 Nov 2020 06:05 )  WBC Count : 13.47 K/uL  RBC Count : 4.35 M/uL  Hemoglobin : 13.1 g/dL  Hematocrit : 39.2 %  Platelet Count - Automated : 370 K/uL  Mean Cell Volume : 90.1 fl  Mean Cell Hemoglobin : 30.1 pg  Mean Cell Hemoglobin Concentration : 33.4 gm/dL  Auto Neutrophil # : x  Auto Lymphocyte # : x  Auto Monocyte # : x  Auto Eosinophil # : x  Auto Basophil # : x  Auto Neutrophil % : x  Auto Lymphocyte % : x  Auto Monocyte % : x  Auto Eosinophil % : x  Auto Basophil % : x      ESR:                   11-28 @ 05:40  --    C-Reactive Protein:     11-28 @ 05:40  --    Procalcitonin:           11-28 @ 05:40   <0.05  ESR:                   11-27 @ 12:44  --    C-Reactive Protein:     11-27 @ 12:44  --    Procalcitonin:           11-27 @ 12:44   <0.05      11-30    142  |  105  |  9   ----------------------------<  118<H>  3.1<L>   |  29  |  1.10    Ca    8.9      30 Nov 2020 06:05    Assessment and Plan:    1. UTI.  2. Toxic metabolic encephalopathy.  3. Weakness.  4. Leukocytosis.    . Urine culture is growing E coli.   .Discontinue IV Rocephin, Add Vantin 200 mg daily for 4 days.  . Discharge planning,           Wayne Wall MD   [phone].

## 2020-11-30 NOTE — PROGRESS NOTE ADULT - SUBJECTIVE AND OBJECTIVE BOX
PROGRESS NOTE  Patient is a 89y old  Female who presents with a chief complaint of ALTERED MENTAL STATUS (30 Nov 2020 10:51)  LATE ENTRY- patient was seen and examined earlier today . Plan of care was discussed with med staff and unit coordinator .   Chart and available morning labs /imaging are reviewed electronically , urgent issues addressed . More information  is being added upon completion of rounds , when more information is collected and management discussed with consultants , medical staff and social service/case management on the floor   OVERNIGHT  No new issues reported by medical staff . All above noted Patient is resting in a bed comfortably .Confused ,poor mentation .No distress noted     HPI:  89 YO F ,[place] [facility] resident was brought to ER with altered mental status and weakness ,found to have probable UTI Admitted for septic workup and evaluation,send blood and urine cx,serial lactate levels,monitor vitals closley,ivfs hydration,monitor urine output and renal profile,iv abx initiated -started on ceftriaxone in ER and ID CONS requested .CT Brain was done and showed Volume loss, microvascular disease, age indeterminate lacunar infarct including  left ventral medial thalamus, MR sensitive for new ischemia. No acute hemorrhage or midline shift. Right ostiomeatal complex obstruction with right frontal, ethmoid and maxillary mucoceles right maxillary bony hyperostosis,, mass effect as polyp or inverting papilloma not excluded,report was discussed with neurologist and no new interventions recommended ,continue home rx .Patient;s past med hx is significant for Anemia  ,Cognitive deficits  ,constipation ,Depression  ,HLD (hyperlipidemia)  ,HTN (hypertension)  ,TIA (transient ischemic attack)  .Found to have BP elevation and cardiology consult requested for comanagement Admitted  to telemetry unit for monitoring , send 3 sets of cardiac enzymes to rule out acute coronary event, obtain ECHO to evaluate LVEF, cardiology consult  ,continue current management, O2 supply, anticoagulation plan as per cardiology consult COVID PCR sent and currently is pending . Palliative care consult requested ,to discuss advance directives and complete MOLST  (27 Nov 2020 18:15)    PAST MEDICAL & SURGICAL HISTORY:  Constipation    HLD (hyperlipidemia)    Depression    HTN (hypertension)    Cognitive deficits    Weakness    Anemia    TIA (transient ischemic attack)        MEDICATIONS  (STANDING):  amLODIPine   Tablet 5 milliGRAM(s) Oral daily  aspirin enteric coated 81 milliGRAM(s) Oral daily  cefpodoxime 200 milliGRAM(s) Oral every 12 hours  cholecalciferol 1000 Unit(s) Oral daily  dextrose 5% + sodium chloride 0.45%. 1000 milliLiter(s) (50 mL/Hr) IV Continuous <Continuous>  escitalopram 10 milliGRAM(s) Oral daily  heparin   Injectable 5000 Unit(s) SubCutaneous every 12 hours  pantoprazole    Tablet 40 milliGRAM(s) Oral before breakfast  polyethylene glycol 3350 17 Gram(s) Oral daily    MEDICATIONS  (PRN):  acetaminophen   Tablet .. 650 milliGRAM(s) Oral every 6 hours PRN Temp greater or equal to 38C (100.4F), Mild Pain (1 - 3)  hydrALAZINE 10 milliGRAM(s) Oral four times a day PRN Systolic blood pressure >180  magnesium hydroxide Suspension 30 milliLiter(s) Oral daily PRN Constipation  melatonin 3 milliGRAM(s) Oral at bedtime PRN Insomnia      OBJECTIVE    T(C): 36.4 (11-30-20 @ 16:17), Max: 37 (11-30-20 @ 04:10)  HR: 72 (11-30-20 @ 16:17) (67 - 78)  BP: 179/80 (11-30-20 @ 16:17) (119/73 - 190/80)  RR: 18 (11-30-20 @ 16:17) (14 - 19)  SpO2: 93% (11-30-20 @ 16:17) (91% - 96%)  Wt(kg): --  I&O's Summary    29 Nov 2020 07:01  -  30 Nov 2020 07:00  --------------------------------------------------------  IN: 450 mL / OUT: 400 mL / NET: 50 mL    30 Nov 2020 07:01  -  30 Nov 2020 16:43  --------------------------------------------------------  IN: 120 mL / OUT: 0 mL / NET: 120 mL          REVIEW OF SYSTEMS:  CONSTITUTIONAL: No fever, weight loss, or fatigue  ROS is unobtainable due to dementia and confusion PATIENT IS CONFUSED AND IS UNABLE TO GIVE ANY/RELIABLE HISTORY OR REVIEW OF SYSTEMS PLEASE ALSO SEE UNDER HPI AND ASSESSMENT FOR OBTAINED REVIEW OF SYSTEMS   PHYSICAL EXAM:  Appearance: NAD. VS past 24 hrs -as above   HEENT:   Moist oral mucosa. Conjunctiva clear b/l.   Neck : supple  Respiratory: Lungs CTAB.  Gastrointestinal:  Soft, nontender. No rebound. No rigidity. BS present	  Cardiovascular: RRR ,S1S2 present  Neurologic: Non-focal. Moving all extremities.  Extremities: No edema. No erythema. No calf tenderness.  Skin: No rashes, No ecchymoses, No cyanosis.	  wounds ,skin lesions-See skin assesment flow sheet   LABS:                        13.1   13.47 )-----------( 370      ( 30 Nov 2020 06:05 )             39.2     11-30    142  |  105  |  9   ----------------------------<  118<H>  3.1<L>   |  29  |  1.10    Ca    8.9      30 Nov 2020 06:05      CAPILLARY BLOOD GLUCOSE              Culture - Urine (collected 27 Nov 2020 18:31)  Source: .Urine Catheterized  Final Report (29 Nov 2020 13:37):    >100,000 CFU/ml Escherichia coli  Organism: Escherichia coli (29 Nov 2020 13:37)  Organism: Escherichia coli (29 Nov 2020 13:37)    Culture - Blood (collected 27 Nov 2020 18:29)  Source: .Blood Blood-Peripheral  Preliminary Report (28 Nov 2020 19:01):    No growth to date.    Culture - Blood (collected 27 Nov 2020 18:29)  Source: .Blood Blood-Peripheral  Preliminary Report (28 Nov 2020 19:01):    No growth to date.      RADIOLOGY & ADDITIONAL TESTS:< from: TTE Echo Complete w/o Contrast w/ Doppler (11.28.20 @ 17:39) >   EXAM:  ECHO TTE WO CON COMP W DOPP         PROCEDURE DATE:  11/28/2020        INTERPRETATION:  Ordering Physician: NGUYEN URIAS [id number]    Indication: LV function    Study Quality: Difficult and limited study  A complete echocardiographic study was performed utilizing standard protocol including spectral and color Doppler in all echocardiographic windows.    Height: 139.7 cm  Weight: 49.9 KG  BSA: 1.36 sq mm  Blood Pressure: 176/61    MEASUREMENTS  IVS: 1.2cm  PWT: 1.1cm  LA: 3.1cm  AO: 2.9cm  AV Cusp Separation: cm  LVIDd: 3.8cm  LVIDs: 2.7cm  LVOT: 2.1cm    LVEF: 55%  RVSP: mmHg    FINDINGS  Left Ventricle: Left ventricle was of normal size, normal LV systolic function EF  Aortic Valve: Aortic sclerosis with normal systolic opening  Mitral Valve: Mitral annular calcifications with mild mitral regurgitation  Tricuspid Valve: Trace tricuspid regurgitation  Pulmonic Valve: Normal  Left Atrium: Normal  Right Ventricle: Normal  Right Atrium: Normal  Diastolic Function: Normal  Pericardium/Pleura: Normal  CONCLUSIONS:  Difficult and limited study  Left ventricle was of normal size, normal LV systolic function EF 55%  Aortic sclerosis  Mitral annular calcifications with mild mitral regurgitation  Mild tricuspid regurgitation  < end of copied text >   reviewed elctronically  ASSESSMENT/PLAN: 	    45minutes spent on this visit, 50% visit time spent in care co-ordination with other attendings and counselling patient  I have discussed care plan with patient and HCP,expressed understanding of problems treatment and their effect and side effects, alternatives in detail,I have asked if they have any questions and concerns and appropriately addressed them to best of my ability

## 2020-11-30 NOTE — PROGRESS NOTE ADULT - PROBLEM SELECTOR PLAN 3
REPORT REVIEWED WITH  ,ISCHEMIC LACUNAR CHANGES ARE OF INDETERMINATE TIME OF ORIGIN .MRI IS PENDING .CONTINUE BASA FOR NOW DAILY

## 2020-11-30 NOTE — PROGRESS NOTE ADULT - SUBJECTIVE AND OBJECTIVE BOX
Date/Time Patient Seen:  		  Referring MD:   Data Reviewed	       Patient is a 89y old  Female who presents with a chief complaint of ALTERED MENTAL STATUS (29 Nov 2020 16:19)      Subjective/HPI     PAST MEDICAL & SURGICAL HISTORY:  Constipation    HLD (hyperlipidemia)    Depression    HTN (hypertension)    Cognitive deficits    Weakness    Anemia    TIA (transient ischemic attack)          Medication list         MEDICATIONS  (STANDING):  amLODIPine   Tablet 5 milliGRAM(s) Oral daily  aspirin enteric coated 81 milliGRAM(s) Oral daily  cefpodoxime 200 milliGRAM(s) Oral every 12 hours  cholecalciferol 1000 Unit(s) Oral daily  dextrose 5% + sodium chloride 0.45%. 1000 milliLiter(s) (50 mL/Hr) IV Continuous <Continuous>  escitalopram 10 milliGRAM(s) Oral daily  heparin   Injectable 5000 Unit(s) SubCutaneous every 12 hours  pantoprazole    Tablet 40 milliGRAM(s) Oral before breakfast  polyethylene glycol 3350 17 Gram(s) Oral daily  potassium chloride    Tablet ER 40 milliEquivalent(s) Oral once  potassium chloride  10 mEq/100 mL IVPB 10 milliEquivalent(s) IV Intermittent every 1 hour    MEDICATIONS  (PRN):  acetaminophen   Tablet .. 650 milliGRAM(s) Oral every 6 hours PRN Temp greater or equal to 38C (100.4F), Mild Pain (1 - 3)  hydrALAZINE 10 milliGRAM(s) Oral four times a day PRN Systolic blood pressure >180  magnesium hydroxide Suspension 30 milliLiter(s) Oral daily PRN Constipation  melatonin 3 milliGRAM(s) Oral at bedtime PRN Insomnia         Vitals log        ICU Vital Signs Last 24 Hrs  T(C): 36.4 (30 Nov 2020 07:46), Max: 37.2 (29 Nov 2020 14:25)  T(F): 97.6 (30 Nov 2020 07:46), Max: 98.9 (29 Nov 2020 14:25)  HR: 67 (30 Nov 2020 07:46) (67 - 79)  BP: 119/73 (30 Nov 2020 07:46) (119/73 - 190/80)  BP(mean): --  ABP: --  ABP(mean): --  RR: 14 (30 Nov 2020 07:46) (14 - 18)  SpO2: 94% (30 Nov 2020 07:46) (90% - 96%)           Input and Output:  I&O's Detail    29 Nov 2020 07:01  -  30 Nov 2020 07:00  --------------------------------------------------------  IN:    dextrose 5% + sodium chloride 0.45%: 450 mL  Total IN: 450 mL    OUT:    Voided (mL): 400 mL  Total OUT: 400 mL    Total NET: 50 mL          Lab Data                        13.1   13.47 )-----------( 370      ( 30 Nov 2020 06:05 )             39.2     11-30    142  |  105  |  9   ----------------------------<  118<H>  3.1<L>   |  29  |  1.10    Ca    8.9      30 Nov 2020 06:05              Review of Systems	      Objective     Physical Examination    heart s1s2  lung dec BS  abd soft      Pertinent Lab findings & Imaging      Benny:  NO   Adequate UO     I&O's Detail    29 Nov 2020 07:01  -  30 Nov 2020 07:00  --------------------------------------------------------  IN:    dextrose 5% + sodium chloride 0.45%: 450 mL  Total IN: 450 mL    OUT:    Voided (mL): 400 mL  Total OUT: 400 mL    Total NET: 50 mL               Discussed with:     Cultures:	        Radiology

## 2020-11-30 NOTE — PROGRESS NOTE ADULT - SUBJECTIVE AND OBJECTIVE BOX
Patient is a 89y Female with a known history of :  Palliative care encounter [Z51.5]    Prophylactic measure [Z29.9]    Constipation [K59.00]    Weakness [R53.1]    Depression [F32.9]    Abnormal CT scan of head [R93.0]    HTN (hypertension) [I10]    UTI (urinary tract infection) [N39.0]    Altered mental status [R41.82]      HPI:  89 YO F ,[place] [facility] resident was brought to ER with altered mental status and weakness ,found to have probable UTI Admitted for septic workup and evaluation,send blood and urine cx,serial lactate levels,monitor vitals closley,ivfs hydration,monitor urine output and renal profile,iv abx initiated -started on ceftriaxone in ER and ID CONS requested .CT Brain was done and showed Volume loss, microvascular disease, age indeterminate lacunar infarct including  left ventral medial thalamus, MR sensitive for new ischemia. No acute hemorrhage or midline shift. Right ostiomeatal complex obstruction with right frontal, ethmoid and maxillary mucoceles right maxillary bony hyperostosis,, mass effect as polyp or inverting papilloma not excluded,report was discussed with neurologist and no new interventions recommended ,continue home rx .Patient;s past med hx is significant for Anemia  ,Cognitive deficits  ,constipation ,Depression  ,HLD (hyperlipidemia)  ,HTN (hypertension)  ,TIA (transient ischemic attack)  .Found to have BP elevation and cardiology consult requested for comanagement Admitted  to telemetry unit for monitoring , send 3 sets of cardiac enzymes to rule out acute coronary event, obtain ECHO to evaluate LVEF, cardiology consult  ,continue current management, O2 supply, anticoagulation plan as per cardiology consult COVID PCR sent and currently is pending . Palliative care consult requested ,to discuss advance directives and complete MOLST  (27 Nov 2020 18:15)      REVIEW OF SYSTEMS:    CONSTITUTIONAL: No fever, weight loss, or fatigue  EYES: No eye pain, visual disturbances, or discharge  ENMT:  No difficulty hearing, tinnitus, vertigo; No sinus or throat pain  NECK: No pain or stiffness  BREASTS: No pain, masses, or nipple discharge  RESPIRATORY: No cough, wheezing, chills or hemoptysis; No shortness of breath  CARDIOVASCULAR: No chest pain, palpitations, dizziness, or leg swelling  GASTROINTESTINAL: No abdominal or epigastric pain. No nausea, vomiting, or hematemesis; No diarrhea or constipation. No melena or hematochezia.  GENITOURINARY: No dysuria, frequency, hematuria, or incontinence  NEUROLOGICAL: No headaches, memory loss, loss of strength, numbness, or tremors  SKIN: No itching, burning, rashes, or lesions   LYMPH NODES: No enlarged glands  ENDOCRINE: No heat or cold intolerance; No hair loss  MUSCULOSKELETAL: No joint pain or swelling; No muscle, back, or extremity pain  PSYCHIATRIC: No depression, anxiety, mood swings, or difficulty sleeping  HEME/LYMPH: No easy bruising, or bleeding gums  ALLERGY AND IMMUNOLOGIC: No hives or eczema    MEDICATIONS  (STANDING):  amLODIPine   Tablet 5 milliGRAM(s) Oral daily  aspirin enteric coated 81 milliGRAM(s) Oral daily  cefpodoxime 200 milliGRAM(s) Oral every 12 hours  cholecalciferol 1000 Unit(s) Oral daily  dextrose 5% + sodium chloride 0.45%. 1000 milliLiter(s) (50 mL/Hr) IV Continuous <Continuous>  escitalopram 10 milliGRAM(s) Oral daily  heparin   Injectable 5000 Unit(s) SubCutaneous every 12 hours  pantoprazole    Tablet 40 milliGRAM(s) Oral before breakfast  polyethylene glycol 3350 17 Gram(s) Oral daily  potassium chloride  10 mEq/100 mL IVPB 10 milliEquivalent(s) IV Intermittent every 1 hour    MEDICATIONS  (PRN):  acetaminophen   Tablet .. 650 milliGRAM(s) Oral every 6 hours PRN Temp greater or equal to 38C (100.4F), Mild Pain (1 - 3)  hydrALAZINE 10 milliGRAM(s) Oral four times a day PRN Systolic blood pressure >180  magnesium hydroxide Suspension 30 milliLiter(s) Oral daily PRN Constipation  melatonin 3 milliGRAM(s) Oral at bedtime PRN Insomnia      ALLERGIES: No Known Allergies      FAMILY HISTORY:      PHYSICAL EXAMINATION:  -----------------------------  T(C): 36.4 (11-30-20 @ 07:46), Max: 37.2 (11-29-20 @ 14:25)  HR: 67 (11-30-20 @ 07:46) (67 - 79)  BP: 119/73 (11-30-20 @ 07:46) (119/73 - 190/80)  RR: 14 (11-30-20 @ 07:46) (14 - 18)  SpO2: 94% (11-30-20 @ 07:46) (90% - 96%)  Wt(kg): --    11-29 @ 07:01  -  11-30 @ 07:00  --------------------------------------------------------  IN:    dextrose 5% + sodium chloride 0.45%: 450 mL  Total IN: 450 mL    OUT:    Voided (mL): 400 mL  Total OUT: 400 mL    Total NET: 50 mL      11-30 @ 07:01  -  11-30 @ 09:23  --------------------------------------------------------  IN:    Oral Fluid: 120 mL  Total IN: 120 mL    OUT:  Total OUT: 0 mL    Total NET: 120 mL            VITALS  T(C): 36.4 (11-30-20 @ 07:46), Max: 37.2 (11-29-20 @ 14:25)  HR: 67 (11-30-20 @ 07:46) (67 - 79)  BP: 119/73 (11-30-20 @ 07:46) (119/73 - 190/80)  RR: 14 (11-30-20 @ 07:46) (14 - 18)  SpO2: 94% (11-30-20 @ 07:46) (90% - 96%)    Constitutional: well developed, normal appearance, well groomed, well nourished, no deformities and no acute distress.   Eyes: the conjunctiva exhibited no abnormalities and the eyelids demonstrated no xanthelasmas.   HEENT: normal oral mucosa, no oral pallor and no oral cyanosis.   Neck: normal jugular venous A waves present, normal jugular venous V waves present and no jugular venous blake A waves.   Pulmonary: no respiratory distress, normal respiratory rhythm and effort, no accessory muscle use and lungs were clear to auscultation bilaterally.   Cardiovascular: heart rate and rhythm were normal, normal S1 and S2 and no murmur, gallop, rub, heave or thrill are present.   Abdomen: soft, non-tender, no hepato-splenomegaly and no abdominal mass palpated.   Musculoskeletal: the gait could not be assessed..   Extremities: no clubbing of the fingernails, no localized cyanosis, no petechial hemorrhages and no ischemic changes.   Skin: normal skin color and pigmentation, no rash, no venous stasis, no skin lesions, no skin ulcer and no xanthoma was observed.   Psychiatric: oriented to person, place, and time, the affect was normal, the mood was normal and not feeling anxious.     LABS:   --------  11-30    142  |  105  |  9   ----------------------------<  118<H>  3.1<L>   |  29  |  1.10    Ca    8.9      30 Nov 2020 06:05                           13.1   13.47 )-----------( 370      ( 30 Nov 2020 06:05 )             39.2           233 mg/dL<H>, --, 29 mg/dL<L>, 143 mg/dL        RADIOLOGY:  -----------------    ECG:     ECHO:

## 2020-11-30 NOTE — PROGRESS NOTE ADULT - ATTENDING COMMENTS
89 YO F ,[place] [facility] resident was brought to ER with altered mental status and weakness ,found to have probable UTI Admitted for septic workup and evaluation,send blood and urine cx,serial lactate levels,monitor vitals closley,ivfs hydration,monitor urine output and renal profile,iv abx initiated -started on ceftriaxone in ER and ID CONS requested .CT Brain was done and showed Volume loss, microvascular disease, age indeterminate lacunar infarct including  left ventral medial thalamus, MR sensitive for new ischemia. No acute hemorrhage or midline shift. Right ostiomeatal complex obstruction with right frontal, ethmoid and maxillary mucoceles right maxillary bony hyperostosis,, mass effect as polyp or inverting papilloma not excluded,report was discussed with neurologist and no new interventions recommended ,continue home rx .Patient;s past med hx is significant for Anemia  ,Cognitive deficits  ,constipation ,Depression  ,HLD (hyperlipidemia)  ,HTN (hypertension)  ,TIA (transient ischemic attack)  .Found to have BP elevation and cardiology consult requested for comanagement Admitted  to telemetry unit for monitoring , send 3 sets of cardiac enzymes to rule out acute coronary event, obtain ECHO to evaluate LVEF, cardiology consult  ,continue current management, O2 supply, anticoagulation plan as per cardiology consult Palliative care consult requested ,to discuss advance directives and complete MOLST

## 2020-11-30 NOTE — PROGRESS NOTE ADULT - SUBJECTIVE AND OBJECTIVE BOX
Neurology follow up note    TASH [id number]      Interval History:    Patient feels ok     MEDICATIONS    acetaminophen   Tablet .. 650 milliGRAM(s) Oral every 6 hours PRN  amLODIPine   Tablet 5 milliGRAM(s) Oral daily  aspirin enteric coated 81 milliGRAM(s) Oral daily  cefpodoxime 200 milliGRAM(s) Oral every 12 hours  cholecalciferol 1000 Unit(s) Oral daily  dextrose 5% + sodium chloride 0.45%. 1000 milliLiter(s) IV Continuous <Continuous>  escitalopram 10 milliGRAM(s) Oral daily  heparin   Injectable 5000 Unit(s) SubCutaneous every 12 hours  hydrALAZINE 10 milliGRAM(s) Oral four times a day PRN  magnesium hydroxide Suspension 30 milliLiter(s) Oral daily PRN  melatonin 3 milliGRAM(s) Oral at bedtime PRN  pantoprazole    Tablet 40 milliGRAM(s) Oral before breakfast  polyethylene glycol 3350 17 Gram(s) Oral daily  potassium chloride  10 mEq/100 mL IVPB 10 milliEquivalent(s) IV Intermittent every 1 hour      Allergies    No Known Allergies    Intolerances            Vital Signs Last 24 Hrs  T(C): 36.4 (30 Nov 2020 07:46), Max: 37.2 (29 Nov 2020 14:25)  T(F): 97.6 (30 Nov 2020 07:46), Max: 98.9 (29 Nov 2020 14:25)  HR: 67 (30 Nov 2020 07:46) (67 - 79)  BP: 119/73 (30 Nov 2020 07:46) (119/73 - 190/80)  BP(mean): --  RR: 14 (30 Nov 2020 07:46) (14 - 18)  SpO2: 94% (30 Nov 2020 07:46) (90% - 96%)      REVIEW OF SYSTEMS:  Very limited secondary to the patient has underlying dementia, is lethargic, but presented to the hospital secondary to decreased responsiveness and mental status       PHYSICAL EXAMINATION:  HEENT:  Head:  Normocephalic, atraumatic.  Eyes:  No scleral icterus.  Ears:  Hard to evaluate secondary to the patient to be lethargic.  NECK:  Supple.  RESPIRATORY:  Decreased breath sounds bilaterally, but gives poor effort.  CARDIOVASCULAR:  S1 and S2 heard.  ABDOMEN:  Soft and nontender.  EXTREMITIES:  No clubbing or cyanosis were noted.      NEUROLOGIC:  The patient was awake, the patient does keep her eyes closed, had difficulty trying to open them.  Speech:  The patient would articulate one to two words.  No dysarthria.  Motor examination is very limited secondary to the patient's uncooperative examination.  Attempted to pull up bilateral upper extremities, the patient would actively resist, pull both arms down, would say overall 3/5, bilateral lower extremities with plantar stimulation, positive flexation at the hip and knee, would say 3/5, did not notice any focal gross asymmetric movements.                  LABS:  CBC Full  -  ( 30 Nov 2020 06:05 )  WBC Count : 13.47 K/uL  RBC Count : 4.35 M/uL  Hemoglobin : 13.1 g/dL  Hematocrit : 39.2 %  Platelet Count - Automated : 370 K/uL  Mean Cell Volume : 90.1 fl  Mean Cell Hemoglobin : 30.1 pg  Mean Cell Hemoglobin Concentration : 33.4 gm/dL  Auto Neutrophil # : x  Auto Lymphocyte # : x  Auto Monocyte # : x  Auto Eosinophil # : x  Auto Basophil # : x  Auto Neutrophil % : x  Auto Lymphocyte % : x  Auto Monocyte % : x  Auto Eosinophil % : x  Auto Basophil % : x      11-30    142  |  105  |  9   ----------------------------<  118<H>  3.1<L>   |  29  |  1.10    Ca    8.9      30 Nov 2020 06:05      Hemoglobin A1C:       Vitamin B12 Vitamin B12, Serum: 706 pg/mL (11-29 @ 11:22)          RADIOLOGY      ANALYSIS AND PLAN:  A 89-year-old with an episode of altered mental status.  For episode of altered mental status, suspect most likely more of metabolic encephalopathy, possibly underlying urinary tract infection.  I would recommend to rule out other causes of metabolic encephalopathy.  The examination is limited but there is no clear signs to suggest a new cerebrovascular accident has ensued, and a small lacunar thalamic infarct will not cause the patient being lethargic and change in mental status.  infectious workup as needed.  Antibiotics as needed.  For history of dementia, secondary to the patient's age, would recommend supportive therapy.  For history of hypertension, monitor systolic blood pressure.  For history of high cholesterol, would check lipid panel.  Would recommend a low-dose statin.  For history of TIAs in the past, would recommend to continue the patient on antiplatelets.  Son's name is Wayne, telephone number is [phone], alternate number is [phone] 11/30/2020    Greater than 40 minutes of time was spent with the patient, plan of care, reviewing data, speaking to the family and  50% of the visit was spent counseling and/or coordinating care with multidisciplinary healthcare team

## 2020-12-01 DIAGNOSIS — J34.9 UNSPECIFIED DISORDER OF NOSE AND NASAL SINUSES: ICD-10-CM

## 2020-12-01 DIAGNOSIS — I63.9 CEREBRAL INFARCTION, UNSPECIFIED: ICD-10-CM

## 2020-12-01 LAB
HCT VFR BLD CALC: 41.1 % — SIGNIFICANT CHANGE UP (ref 34.5–45)
HGB BLD-MCNC: 13.5 G/DL — SIGNIFICANT CHANGE UP (ref 11.5–15.5)
MCHC RBC-ENTMCNC: 30.1 PG — SIGNIFICANT CHANGE UP (ref 27–34)
MCHC RBC-ENTMCNC: 32.8 GM/DL — SIGNIFICANT CHANGE UP (ref 32–36)
MCV RBC AUTO: 91.5 FL — SIGNIFICANT CHANGE UP (ref 80–100)
NRBC # BLD: 0 /100 WBCS — SIGNIFICANT CHANGE UP (ref 0–0)
PLATELET # BLD AUTO: 340 K/UL — SIGNIFICANT CHANGE UP (ref 150–400)
PROCALCITONIN SERPL-MCNC: 0.08 NG/ML — HIGH (ref 0–0.04)
RBC # BLD: 4.49 M/UL — SIGNIFICANT CHANGE UP (ref 3.8–5.2)
RBC # FLD: 13.8 % — SIGNIFICANT CHANGE UP (ref 10.3–14.5)
SARS-COV-2 RNA SPEC QL NAA+PROBE: SIGNIFICANT CHANGE UP
WBC # BLD: 11.88 K/UL — HIGH (ref 3.8–10.5)
WBC # FLD AUTO: 11.88 K/UL — HIGH (ref 3.8–10.5)

## 2020-12-01 RX ORDER — LACTOBACILLUS ACIDOPHILUS 100MM CELL
2 CAPSULE ORAL
Qty: 0 | Refills: 0 | DISCHARGE
Start: 2020-12-01

## 2020-12-01 RX ORDER — AMLODIPINE BESYLATE 2.5 MG/1
1 TABLET ORAL
Qty: 0 | Refills: 0 | DISCHARGE
Start: 2020-12-01

## 2020-12-01 RX ORDER — ACETAMINOPHEN 500 MG
2 TABLET ORAL
Qty: 0 | Refills: 0 | DISCHARGE
Start: 2020-12-01

## 2020-12-01 RX ORDER — SODIUM CHLORIDE 0.65 %
1 AEROSOL, SPRAY (ML) NASAL
Refills: 0 | Status: DISCONTINUED | OUTPATIENT
Start: 2020-12-01 | End: 2020-12-01

## 2020-12-01 RX ORDER — SIMVASTATIN 20 MG/1
1 TABLET, FILM COATED ORAL
Qty: 0 | Refills: 0 | DISCHARGE
Start: 2020-12-01

## 2020-12-01 RX ORDER — SODIUM CHLORIDE 0.65 %
0 AEROSOL, SPRAY (ML) NASAL
Qty: 0 | Refills: 0 | DISCHARGE
Start: 2020-12-01

## 2020-12-01 RX ORDER — SODIUM CHLORIDE 0.65 %
1 AEROSOL, SPRAY (ML) NASAL
Refills: 0 | Status: DISCONTINUED | OUTPATIENT
Start: 2020-12-01 | End: 2020-12-02

## 2020-12-01 RX ORDER — ESCITALOPRAM OXALATE 10 MG/1
1 TABLET, FILM COATED ORAL
Qty: 0 | Refills: 0 | DISCHARGE

## 2020-12-01 RX ORDER — LACTOBACILLUS ACIDOPHILUS 100MM CELL
1 CAPSULE ORAL DAILY
Refills: 0 | Status: DISCONTINUED | OUTPATIENT
Start: 2020-12-01 | End: 2020-12-02

## 2020-12-01 RX ORDER — HYDRALAZINE HCL 50 MG
1 TABLET ORAL
Qty: 0 | Refills: 0 | DISCHARGE
Start: 2020-12-01

## 2020-12-01 RX ORDER — LANOLIN ALCOHOL/MO/W.PET/CERES
1 CREAM (GRAM) TOPICAL
Qty: 0 | Refills: 0 | DISCHARGE
Start: 2020-12-01

## 2020-12-01 RX ORDER — MAGNESIUM HYDROXIDE 400 MG/1
30 TABLET, CHEWABLE ORAL
Qty: 0 | Refills: 0 | DISCHARGE
Start: 2020-12-01

## 2020-12-01 RX ORDER — AMLODIPINE BESYLATE 2.5 MG/1
1 TABLET ORAL
Qty: 0 | Refills: 0 | DISCHARGE

## 2020-12-01 RX ORDER — FERROUS SULFATE 325(65) MG
7.5 TABLET ORAL
Qty: 0 | Refills: 0 | DISCHARGE

## 2020-12-01 RX ORDER — ESCITALOPRAM OXALATE 10 MG/1
1 TABLET, FILM COATED ORAL
Qty: 0 | Refills: 0 | DISCHARGE
Start: 2020-12-01

## 2020-12-01 RX ORDER — CEFPODOXIME PROXETIL 100 MG
1 TABLET ORAL
Qty: 0 | Refills: 0 | DISCHARGE
Start: 2020-12-01

## 2020-12-01 RX ORDER — SIMVASTATIN 20 MG/1
40 TABLET, FILM COATED ORAL AT BEDTIME
Refills: 0 | Status: DISCONTINUED | OUTPATIENT
Start: 2020-12-01 | End: 2020-12-02

## 2020-12-01 RX ADMIN — Medication 81 MILLIGRAM(S): at 12:20

## 2020-12-01 RX ADMIN — SODIUM CHLORIDE 50 MILLILITER(S): 9 INJECTION, SOLUTION INTRAVENOUS at 06:56

## 2020-12-01 RX ADMIN — Medication 200 MILLIGRAM(S): at 06:59

## 2020-12-01 RX ADMIN — AMLODIPINE BESYLATE 5 MILLIGRAM(S): 2.5 TABLET ORAL at 06:59

## 2020-12-01 RX ADMIN — SIMVASTATIN 40 MILLIGRAM(S): 20 TABLET, FILM COATED ORAL at 22:24

## 2020-12-01 RX ADMIN — POLYETHYLENE GLYCOL 3350 17 GRAM(S): 17 POWDER, FOR SOLUTION ORAL at 12:20

## 2020-12-01 RX ADMIN — ESCITALOPRAM OXALATE 10 MILLIGRAM(S): 10 TABLET, FILM COATED ORAL at 12:20

## 2020-12-01 RX ADMIN — HEPARIN SODIUM 5000 UNIT(S): 5000 INJECTION INTRAVENOUS; SUBCUTANEOUS at 06:59

## 2020-12-01 RX ADMIN — PANTOPRAZOLE SODIUM 40 MILLIGRAM(S): 20 TABLET, DELAYED RELEASE ORAL at 06:59

## 2020-12-01 RX ADMIN — Medication 3 MILLIGRAM(S): at 22:24

## 2020-12-01 RX ADMIN — Medication 200 MILLIGRAM(S): at 18:12

## 2020-12-01 RX ADMIN — Medication 1000 UNIT(S): at 12:20

## 2020-12-01 RX ADMIN — HEPARIN SODIUM 5000 UNIT(S): 5000 INJECTION INTRAVENOUS; SUBCUTANEOUS at 18:12

## 2020-12-01 NOTE — PROGRESS NOTE ADULT - SUBJECTIVE AND OBJECTIVE BOX
Patient is a 89y Female with a known history of :  Hypokalemia [E87.6]    Dysphagia [R13.10]    Palliative care encounter [Z51.5]    Prophylactic measure [Z29.9]    Constipation [K59.00]    Weakness [R53.1]    Depression [F32.9]    Abnormal CT scan of head [R93.0]    HTN (hypertension) [I10]    UTI (urinary tract infection) [N39.0]    Altered mental status [R41.82]      HPI:  89 YO F ,[place] [facility] resident was brought to ER with altered mental status and weakness ,found to have probable UTI Admitted for septic workup and evaluation,send blood and urine cx,serial lactate levels,monitor vitals closley,ivfs hydration,monitor urine output and renal profile,iv abx initiated -started on ceftriaxone in ER and ID CONS requested .CT Brain was done and showed Volume loss, microvascular disease, age indeterminate lacunar infarct including  left ventral medial thalamus, MR sensitive for new ischemia. No acute hemorrhage or midline shift. Right ostiomeatal complex obstruction with right frontal, ethmoid and maxillary mucoceles right maxillary bony hyperostosis,, mass effect as polyp or inverting papilloma not excluded,report was discussed with neurologist and no new interventions recommended ,continue home rx .Patient;s past med hx is significant for Anemia  ,Cognitive deficits  ,constipation ,Depression  ,HLD (hyperlipidemia)  ,HTN (hypertension)  ,TIA (transient ischemic attack)  .Found to have BP elevation and cardiology consult requested for comanagement Admitted  to telemetry unit for monitoring , send 3 sets of cardiac enzymes to rule out acute coronary event, obtain ECHO to evaluate LVEF, cardiology consult  ,continue current management, O2 supply, anticoagulation plan as per cardiology consult COVID PCR sent and currently is pending . Palliative care consult requested ,to discuss advance directives and complete MOLST  (27 Nov 2020 18:15)      REVIEW OF SYSTEMS:    CONSTITUTIONAL: No fever, weight loss, or fatigue  EYES: No eye pain, visual disturbances, or discharge  ENMT:  No difficulty hearing, tinnitus, vertigo; No sinus or throat pain  NECK: No pain or stiffness  BREASTS: No pain, masses, or nipple discharge  RESPIRATORY: No cough, wheezing, chills or hemoptysis; No shortness of breath  CARDIOVASCULAR: No chest pain, palpitations, dizziness, or leg swelling  GASTROINTESTINAL: No abdominal or epigastric pain. No nausea, vomiting, or hematemesis; No diarrhea or constipation. No melena or hematochezia.  GENITOURINARY: No dysuria, frequency, hematuria, or incontinence  NEUROLOGICAL: No headaches, memory loss, loss of strength, numbness, or tremors  SKIN: No itching, burning, rashes, or lesions   LYMPH NODES: No enlarged glands  ENDOCRINE: No heat or cold intolerance; No hair loss  MUSCULOSKELETAL: No joint pain or swelling; No muscle, back, or extremity pain  PSYCHIATRIC: No depression, anxiety, mood swings, or difficulty sleeping  HEME/LYMPH: No easy bruising, or bleeding gums  ALLERGY AND IMMUNOLOGIC: No hives or eczema    MEDICATIONS  (STANDING):  amLODIPine   Tablet 5 milliGRAM(s) Oral daily  aspirin enteric coated 81 milliGRAM(s) Oral daily  cefpodoxime 200 milliGRAM(s) Oral every 12 hours  cholecalciferol 1000 Unit(s) Oral daily  dextrose 5% + sodium chloride 0.45%. 1000 milliLiter(s) (50 mL/Hr) IV Continuous <Continuous>  escitalopram 10 milliGRAM(s) Oral daily  heparin   Injectable 5000 Unit(s) SubCutaneous every 12 hours  pantoprazole    Tablet 40 milliGRAM(s) Oral before breakfast  polyethylene glycol 3350 17 Gram(s) Oral daily    MEDICATIONS  (PRN):  acetaminophen   Tablet .. 650 milliGRAM(s) Oral every 6 hours PRN Temp greater or equal to 38C (100.4F), Mild Pain (1 - 3)  hydrALAZINE 10 milliGRAM(s) Oral four times a day PRN Systolic blood pressure >180  magnesium hydroxide Suspension 30 milliLiter(s) Oral daily PRN Constipation  melatonin 3 milliGRAM(s) Oral at bedtime PRN Insomnia      ALLERGIES: No Known Allergies      FAMILY HISTORY:      PHYSICAL EXAMINATION:  -----------------------------  T(C): 36.4 (12-01-20 @ 07:25), Max: 36.8 (11-30-20 @ 20:11)  HR: 77 (12-01-20 @ 07:25) (66 - 77)  BP: 155/79 (12-01-20 @ 07:25) (141/99 - 179/80)  RR: 16 (12-01-20 @ 07:25) (16 - 19)  SpO2: 96% (12-01-20 @ 07:25) (91% - 96%)  Wt(kg): --    11-30 @ 07:01  -  12-01 @ 07:00  --------------------------------------------------------  IN:    Oral Fluid: 120 mL  Total IN: 120 mL    OUT:    Voided (mL): 300 mL  Total OUT: 300 mL    Total NET: -180 mL            VITALS  T(C): 36.4 (12-01-20 @ 07:25), Max: 36.8 (11-30-20 @ 20:11)  HR: 77 (12-01-20 @ 07:25) (66 - 77)  BP: 155/79 (12-01-20 @ 07:25) (141/99 - 179/80)  RR: 16 (12-01-20 @ 07:25) (16 - 19)  SpO2: 96% (12-01-20 @ 07:25) (91% - 96%)    Constitutional: well developed, normal appearance, well groomed, well nourished, no deformities and no acute distress.   Eyes: the conjunctiva exhibited no abnormalities and the eyelids demonstrated no xanthelasmas.   HEENT: normal oral mucosa, no oral pallor and no oral cyanosis.   Neck: normal jugular venous A waves present, normal jugular venous V waves present and no jugular venous blake A waves.   Pulmonary: no respiratory distress, normal respiratory rhythm and effort, no accessory muscle use and lungs were clear to auscultation bilaterally.   Cardiovascular: heart rate and rhythm were normal, normal S1 and S2 and no murmur, gallop, rub, heave or thrill are present.   Abdomen: soft, non-tender, no hepato-splenomegaly and no abdominal mass palpated.   Musculoskeletal: the gait could not be assessed..   Extremities: no clubbing of the fingernails, no localized cyanosis, no petechial hemorrhages and no ischemic changes.   Skin: normal skin color and pigmentation, no rash, no venous stasis, no skin lesions, no skin ulcer and no xanthoma was observed.   Psychiatric: oriented to person, place, and time, the affect was normal, the mood was normal and not feeling anxious.     LABS:   --------  11-30    142  |  105  |  9   ----------------------------<  118<H>  3.1<L>   |  29  |  1.10    Ca    8.9      30 Nov 2020 06:05                           13.1   13.47 )-----------( 370      ( 30 Nov 2020 06:05 )             39.2                 RADIOLOGY:  -----------------    ECG:     ECHO:

## 2020-12-01 NOTE — DISCHARGE NOTE PROVIDER - PROVIDER TOKENS
PROVIDER:[TOKEN:[[id number]],FOLLOWUP:[2 weeks]],PROVIDER:[TOKEN:[[id number]],FOLLOWUP:[1 month]],PROVIDER:[TOKEN:[[id number]],FOLLOWUP:[1 month]] PROVIDER:[TOKEN:[[id number]],FOLLOWUP:[1 week]],PROVIDER:[TOKEN:[[id number]],FOLLOWUP:[1 month]],PROVIDER:[TOKEN:[[id number]],FOLLOWUP:[1 month]]

## 2020-12-01 NOTE — PROGRESS NOTE ADULT - PROBLEM SELECTOR PLAN 1
LIKELY 2/2 TO ACUTE METABOLIC ENCEPHALOPATHY SECONDARY TO UTI ,POA .PROGRESSION /ONSET OF DEMENTIA MAY BE CONTRIBUTING FACTOR .NEUROLOGY CONS REQUESTED TO RULE OUT ACUTE CVA AND MRI showed  left thalamus  small acute lacunar infarct .Son is aware of findings LIKELY 2/2 TO ACUTE METABOLIC ENCEPHALOPATHY SECONDARY TO UTI AND R SINUSITIS  ,POA .PROGRESSION /ONSET OF DEMENTIA MAY BE CONTRIBUTING FACTOR .NEUROLOGY CONS REQUESTED TO RULE OUT ACUTE CVA AND MRI showed  left thalamus  small acute lacunar infarct .Son is aware of findings .As per neurologist it is old infarct and PATIENT WAS RULED OUT FOR ACUTE CVA .

## 2020-12-01 NOTE — PROGRESS NOTE ADULT - SUBJECTIVE AND OBJECTIVE BOX
Interval History:    CENTRAL LINE:   [  ] YES       [  ] NO  OLMOS:                 [  ] YES       [  ] NO         REVIEW OF SYSTEMS:  All Systems below were reviewed and are negative [  ]  HEENT:  ID:  Pulmonary:  Cardiac:  GI:  Renal:  Musculoskeletal:  All other systems above were reviewed and are negative   [  ]      MEDICATIONS  (STANDING):  amLODIPine   Tablet 5 milliGRAM(s) Oral daily  aspirin enteric coated 81 milliGRAM(s) Oral daily  cefpodoxime 200 milliGRAM(s) Oral every 12 hours  cholecalciferol 1000 Unit(s) Oral daily  escitalopram 10 milliGRAM(s) Oral daily  heparin   Injectable 5000 Unit(s) SubCutaneous every 12 hours  lactobacillus acidophilus 1 Tablet(s) Oral daily  pantoprazole    Tablet 40 milliGRAM(s) Oral before breakfast  polyethylene glycol 3350 17 Gram(s) Oral daily  simvastatin 40 milliGRAM(s) Oral at bedtime  sodium chloride 0.65% Nasal 1 Spray(s) Right Nostril two times a day    MEDICATIONS  (PRN):  acetaminophen   Tablet .. 650 milliGRAM(s) Oral every 6 hours PRN Temp greater or equal to 38C (100.4F), Mild Pain (1 - 3)  hydrALAZINE 10 milliGRAM(s) Oral four times a day PRN Systolic blood pressure >180  magnesium hydroxide Suspension 30 milliLiter(s) Oral daily PRN Constipation  melatonin 3 milliGRAM(s) Oral at bedtime PRN Insomnia      Vital Signs Last 24 Hrs  T(C): 36.4 (01 Dec 2020 15:49), Max: 36.9 (01 Dec 2020 11:49)  T(F): 97.5 (01 Dec 2020 15:49), Max: 98.5 (01 Dec 2020 11:49)  HR: 83 (01 Dec 2020 15:49) (66 - 83)  BP: 154/69 (01 Dec 2020 15:49) (154/69 - 177/85)  BP(mean): --  RR: 18 (01 Dec 2020 15:49) (16 - 18)  SpO2: 92% (01 Dec 2020 15:49) (90% - 96%)    I&O's Summary    30 Nov 2020 07:01  -  01 Dec 2020 07:00  --------------------------------------------------------  IN: 120 mL / OUT: 300 mL / NET: -180 mL    01 Dec 2020 07:01  -  01 Dec 2020 18:26  --------------------------------------------------------  IN: 240 mL / OUT: 0 mL / NET: 240 mL        PHYSICAL EXAM:  HEENT: NC/AT; PERRLA  Neck: Soft; no tenderness  Lungs: CTA bilaterally; no wheezing.   Heart:  Abdomen:  Genital/ Rectal:  Extremities:  Neurologic:  Vascular:      LABORATORY:    CBC Full  -  ( 01 Dec 2020 09:00 )  WBC Count : 11.88 K/uL  RBC Count : 4.49 M/uL  Hemoglobin : 13.5 g/dL  Hematocrit : 41.1 %  Platelet Count - Automated : 340 K/uL  Mean Cell Volume : 91.5 fl  Mean Cell Hemoglobin : 30.1 pg  Mean Cell Hemoglobin Concentration : 32.8 gm/dL  Auto Neutrophil # : x  Auto Lymphocyte # : x  Auto Monocyte # : x  Auto Eosinophil # : x  Auto Basophil # : x  Auto Neutrophil % : x  Auto Lymphocyte % : x  Auto Monocyte % : x  Auto Eosinophil % : x  Auto Basophil % : x      ESR:                   12-01 @ 09:00  --    C-Reactive Protein:     12-01 @ 09:00  --    Procalcitonin:           12-01 @ 09:00   0.08  ESR:                   11-28 @ 05:40  --    C-Reactive Protein:     11-28 @ 05:40  --    Procalcitonin:           11-28 @ 05:40   <0.05  ESR:                   11-27 @ 12:44  --    C-Reactive Protein:     11-27 @ 12:44  --    Procalcitonin:           11-27 @ 12:44   <0.05      12-01    142  |  107  |  8   ----------------------------<  120<H>  3.4<L>   |  25  |  0.96    Ca    9.1      01 Dec 2020 09:00  Phos  2.8     12-01  Mg     2.1     12-01            Assessment and Plan:          Wayne Wall MD   [phone].   She is afebrile.   Comfortable.      MEDICATIONS  (STANDING):  amLODIPine   Tablet 5 milliGRAM(s) Oral daily  aspirin enteric coated 81 milliGRAM(s) Oral daily  cefpodoxime 200 milliGRAM(s) Oral every 12 hours  cholecalciferol 1000 Unit(s) Oral daily  escitalopram 10 milliGRAM(s) Oral daily  heparin   Injectable 5000 Unit(s) SubCutaneous every 12 hours  lactobacillus acidophilus 1 Tablet(s) Oral daily  pantoprazole    Tablet 40 milliGRAM(s) Oral before breakfast  polyethylene glycol 3350 17 Gram(s) Oral daily  simvastatin 40 milliGRAM(s) Oral at bedtime  sodium chloride 0.65% Nasal 1 Spray(s) Right Nostril two times a day    MEDICATIONS  (PRN):  acetaminophen   Tablet .. 650 milliGRAM(s) Oral every 6 hours PRN Temp greater or equal to 38C (100.4F), Mild Pain (1 - 3)  hydrALAZINE 10 milliGRAM(s) Oral four times a day PRN Systolic blood pressure >180  magnesium hydroxide Suspension 30 milliLiter(s) Oral daily PRN Constipation  melatonin 3 milliGRAM(s) Oral at bedtime PRN Insomnia      Vital Signs Last 24 Hrs  T(C): 36.4 (01 Dec 2020 15:49), Max: 36.9 (01 Dec 2020 11:49)  T(F): 97.5 (01 Dec 2020 15:49), Max: 98.5 (01 Dec 2020 11:49)  HR: 83 (01 Dec 2020 15:49) (66 - 83)  BP: 154/69 (01 Dec 2020 15:49) (154/69 - 177/85)  BP(mean): --  RR: 18 (01 Dec 2020 15:49) (16 - 18)  SpO2: 92% (01 Dec 2020 15:49) (90% - 96%)    I&O's Summary    30 Nov 2020 07:01  -  01 Dec 2020 07:00  --------------------------------------------------------  IN: 120 mL / OUT: 300 mL / NET: -180 mL    01 Dec 2020 07:01  -  01 Dec 2020 18:26  --------------------------------------------------------  IN: 240 mL / OUT: 0 mL / NET: 240 mL    PHYSICAL EXAM:  HEENT: NC/AT; PERRLA  Neck: Soft; no tenderness  Lungs: Coarse BS bilaterally; no wheezing.   Heart: RRR; no murmurs.   Abdomen: Soft; no tenderness.   Extremities: No ulcers.    Neurologic: Awake,       LABORATORY:    CBC Full  -  ( 01 Dec 2020 09:00 )  WBC Count : 11.88 K/uL  RBC Count : 4.49 M/uL  Hemoglobin : 13.5 g/dL  Hematocrit : 41.1 %  Platelet Count - Automated : 340 K/uL  Mean Cell Volume : 91.5 fl  Mean Cell Hemoglobin : 30.1 pg  Mean Cell Hemoglobin Concentration : 32.8 gm/dL  Auto Neutrophil # : x  Auto Lymphocyte # : x  Auto Monocyte # : x  Auto Eosinophil # : x  Auto Basophil # : x  Auto Neutrophil % : x  Auto Lymphocyte % : x  Auto Monocyte % : x  Auto Eosinophil % : x  Auto Basophil % : x      ESR:                   12-01 @ 09:00  --    C-Reactive Protein:     12-01 @ 09:00  --    Procalcitonin:           12-01 @ 09:00   0.08  ESR:                   11-28 @ 05:40  --    C-Reactive Protein:     11-28 @ 05:40  --    Procalcitonin:           11-28 @ 05:40   <0.05  ESR:                   11-27 @ 12:44  --    C-Reactive Protein:     11-27 @ 12:44  --    Procalcitonin:           11-27 @ 12:44   <0.05      12-01    142  |  107  |  8   ----------------------------<  120<H>  3.4<L>   |  25  |  0.96    Ca    9.1      01 Dec 2020 09:00  Phos  2.8     12-01  Mg     2.1     12-01    Assessment and Plan:    1. UTI.  2. Toxic metabolic encephalopathy.  3. Weakness.  4. Sinusitis.     . Urine culture is growing E coli.   . MRI of brain showed severe sinusitis. Will extend po Vantin to a total of 7 days.   . Discharge planning,       Wayne Wall MD   [phone].

## 2020-12-01 NOTE — DISCHARGE NOTE PROVIDER - NSDCCPCAREPLAN_GEN_ALL_CORE_FT
PRINCIPAL DISCHARGE DIAGNOSIS  Diagnosis: Altered mental status  Assessment and Plan of Treatment:       SECONDARY DISCHARGE DIAGNOSES  Diagnosis: Acute metabolic encephalopathy  Assessment and Plan of Treatment:     Diagnosis: UTI (urinary tract infection)  Assessment and Plan of Treatment: UTI (urinary tract infection)    Diagnosis: Sinus disorder  Assessment and Plan of Treatment: Sinus disorder    Diagnosis: Hypokalemia  Assessment and Plan of Treatment: Hypokalemia    Diagnosis: Dysphagia  Assessment and Plan of Treatment: Dysphagia    Diagnosis: Constipation  Assessment and Plan of Treatment: Constipation

## 2020-12-01 NOTE — DISCHARGE NOTE PROVIDER - CARE PROVIDER_API CALL
Reza Strange)  Otolaryngology  [address] [facility], [address]  [place] [address]  Phone: [phone]  Fax: [phone]  Follow Up Time: 2 weeks    Gabriela Clifton  NEUROLOGY  [address] [place] [address]  Phone: [phone]  Fax: [phone]  Follow Up Time: 1 month    Suraj Jack)  Cardiology Medicine  [address] [place] [address], [address]  [place] [address]  Phone: [phone]  Fax: [phone]  Follow Up Time: 1 month   Reza Strange)  Otolaryngology  [address] [facility], [address]  [place] [address]  Phone: [phone]  Fax: [phone]  Follow Up Time: 1 week    Gabriela Clifton  NEUROLOGY  [address] [place] [address]  Phone: [phone]  Fax: [phone]  Follow Up Time: 1 month    Suraj Jack)  Cardiology Medicine  [address] [place] [address], [address]  [place] [address]  Phone: [phone]  Fax: [phone]  Follow Up Time: 1 month

## 2020-12-01 NOTE — PROGRESS NOTE ADULT - ASSESSMENT
89 YO F ,[facility] resident was brought to ER with altered mental status and weakness ,found to have probable UTI Admitted for septic workup and evaluation,send blood and urine cx,serial lactate levels,monitor vitals closley,ivfs hydration,monitor urine output and renal profile,iv abx initiated -started on ceftriaxone in ER and ID CONS requested .CT Brain was done and showed Volume loss, microvascular disease, age indeterminate lacunar infarct including  left ventral medial thalamus, MR sensitive for new ischemia. No acute hemorrhage or midline shift. Right ostiomeatal complex obstruction with right frontal, ethmoid and maxillary mucoceles right maxillary bony hyperostosis,, mass effect as polyp or inverting papilloma not excluded,report was discussed with neurologist and no new interventions recommended ,continue home rx .Patient;s past med hx is significant for Anemia  ,Cognitive deficits  ,constipation ,Depression  ,HLD (hyperlipidemia)  ,HTN (hypertension)  ,TIA (transient ischemic attack)  .Found to have BP elevation and cardiology consult requested for comanagement Admitted  to telemetry unit for monitoring , send 3 sets of cardiac enzymes to rule out acute coronary event, obtain ECHO to evaluate LVEF, cardiology consult  ,continue current management, O2 supply, anticoagulation plan as per cardiology consult Palliative care consult requested ,to discuss advance directives and complete MOLST

## 2020-12-01 NOTE — DISCHARGE NOTE PROVIDER - HOSPITAL COURSE
89 YO F ,[facility] resident was brought to ER with altered mental status and weakness ,found to have probable UTI Admitted for septic workup and evaluation,send blood and urine cx,serial lactate levels,monitor vitals closley,ivfs hydration,monitor urine output and renal profile,iv abx initiated -started on ceftriaxone in ER and ID CONS requested .CT Brain was done and showed Volume loss, microvascular disease, age indeterminate lacunar infarct including  left ventral medial thalamus, MR sensitive for new ischemia. No acute hemorrhage or midline shift. Right ostiomeatal complex obstruction with right frontal, ethmoid and maxillary mucoceles right maxillary bony hyperostosis,, mass effect as polyp or inverting papilloma not excluded,report was discussed with neurologist and no new interventions recommended ,continue home rx .Patient;s past med hx is significant for Anemia  ,Cognitive deficits  ,constipation ,Depression  ,HLD (hyperlipidemia)  ,HTN (hypertension)  ,TIA (transient ischemic attack)  .Found to have BP elevation and cardiology consult requested for comanagement Admitted  to telemetry unit for monitoring , send 3 sets of cardiac enzymes to rule out acute coronary event, obtain ECHO to evaluate LVEF, cardiology consult  ,continue current management, O2 supply, anticoagulation plan as per cardiology consult Palliative care consult requested ,to discuss advance directives and complete MOLST     Problem/Plan - 1:  ·  Problem: Altered mental status.  Plan: LIKELY 2/2 TO ACUTE METABOLIC ENCEPHALOPATHY SECONDARY TO UTI AND R SINUSITIS  ,POA .PROGRESSION /ONSET OF DEMENTIA MAY BE CONTRIBUTING FACTOR .NEUROLOGY CONS REQUESTED TO RULE OUT ACUTE CVA AND MRI showed  left thalamus  small acute lacunar infarct .Son is aware of findings .As per neurologist it is old infarct and PATIENT WAS RULED OUT FOR ACUTE CVA .    Problem/Plan - 2:  ·  Problem: UTI (urinary tract infection).  Plan: SEEN BY ID ,ON VANTIN.     Problem/Plan - 3:  ·  Problem: Sinus disorder.  Plan: CONTINUE VANTIN FOR 7 DAYS AS PER  ,ENT FOLLOWUP AS OUTPATIENT.     Problem/Plan - 4:  ·  Problem: CVA (cerebral vascular accident).  Plan: RULED OUT ,CONTINUE ASA AS PER .    Problem/Plan - 5:  ·  Problem: Dysphagia.  Plan: DIET AS PER SLP ,ASPIRATION PRECAUTIONS.     Problem/Plan - 6:  Problem: Hypokalemia. Plan: REPLACED ,SERIAL BMP.    Problem/Plan - 7:  ·  Problem: HTN (hypertension).  Plan: AS PER CARDIOLOGY CONSULT ,CONTINUE NORVASC ,HYDRALAZINE PRN.     Problem/Plan - 8:  ·  Problem: Depression.  Plan: SUPPORTIVE CARE ,CONTINUE LEXAPRO.     Problem/Plan - 9:  ·  Problem: Constipation.  Plan: BOWEL REGIMEN.     Problem/Plan - 10:  Problem: Prophylactic measure. Plan; Gastrointestinal stress ulcer prophylaxis and DVT prophylaxis administered.

## 2020-12-01 NOTE — PROGRESS NOTE ADULT - SUBJECTIVE AND OBJECTIVE BOX
Neurology follow up note    TASH [id number]      Interval History:    Patient feels ok no new complaints.    MEDICATIONS    acetaminophen   Tablet .. 650 milliGRAM(s) Oral every 6 hours PRN  amLODIPine   Tablet 5 milliGRAM(s) Oral daily  aspirin enteric coated 81 milliGRAM(s) Oral daily  cefpodoxime 200 milliGRAM(s) Oral every 12 hours  cholecalciferol 1000 Unit(s) Oral daily  dextrose 5% + sodium chloride 0.45%. 1000 milliLiter(s) IV Continuous <Continuous>  escitalopram 10 milliGRAM(s) Oral daily  heparin   Injectable 5000 Unit(s) SubCutaneous every 12 hours  hydrALAZINE 10 milliGRAM(s) Oral four times a day PRN  magnesium hydroxide Suspension 30 milliLiter(s) Oral daily PRN  melatonin 3 milliGRAM(s) Oral at bedtime PRN  pantoprazole    Tablet 40 milliGRAM(s) Oral before breakfast  polyethylene glycol 3350 17 Gram(s) Oral daily      Allergies    No Known Allergies    Intolerances            Vital Signs Last 24 Hrs  T(C): 36.4 (01 Dec 2020 07:25), Max: 36.8 (30 Nov 2020 20:11)  T(F): 97.5 (01 Dec 2020 07:25), Max: 98.3 (01 Dec 2020 00:46)  HR: 77 (01 Dec 2020 07:25) (66 - 77)  BP: 155/79 (01 Dec 2020 07:25) (141/99 - 179/80)  BP(mean): --  RR: 16 (01 Dec 2020 07:25) (16 - 19)  SpO2: 96% (01 Dec 2020 07:25) (91% - 96%)          REVIEW OF SYSTEMS:  Very limited secondary to the patient has underlying dementia, is lethargic, but presented to the hospital secondary to decreased responsiveness and mental status       PHYSICAL EXAMINATION:  HEENT:  Head:  Normocephalic, atraumatic.  Eyes:  No scleral icterus.  Ears:  Hard to evaluate secondary to the patient to be lethargic.  NECK:  Supple.  RESPIRATORY:  Decreased breath sounds bilaterally, but gives poor effort.  CARDIOVASCULAR:  S1 and S2 heard.  ABDOMEN:  Soft and nontender.  EXTREMITIES:  No clubbing or cyanosis were noted.      NEUROLOGIC:  The patient was awake, the patient  opened eyes   Speech:  The patient would articulate one to two words.  No dysarthria.  Motor examination is very limited secondary to the patient's uncooperative examination.  Attempted to pull up bilateral upper extremities, the patient would actively resist, pull both arms down, would say overall 3/5, bilateral lower extremities with plantar stimulation, positive flexation at the hip and knee, would say 3/5, did not notice any focal gross asymmetric movements.           LABS:  CBC Full  -  ( 01 Dec 2020 09:00 )  WBC Count : 11.88 K/uL  RBC Count : 4.49 M/uL  Hemoglobin : 13.5 g/dL  Hematocrit : 41.1 %  Platelet Count - Automated : 340 K/uL  Mean Cell Volume : 91.5 fl  Mean Cell Hemoglobin : 30.1 pg  Mean Cell Hemoglobin Concentration : 32.8 gm/dL  Auto Neutrophil # : x  Auto Lymphocyte # : x  Auto Monocyte # : x  Auto Eosinophil # : x  Auto Basophil # : x  Auto Neutrophil % : x  Auto Lymphocyte % : x  Auto Monocyte % : x  Auto Eosinophil % : x  Auto Basophil % : x      12-01    142  |  107  |  8   ----------------------------<  120<H>  3.4<L>   |  25  |  0.96    Ca    9.1      01 Dec 2020 09:00  Phos  2.8     12-01  Mg     2.1     12-01      Hemoglobin A1C:       Vitamin B12         RADIOLOGY    ANALYSIS AND PLAN:  A 89-year-old with an episode of altered mental status.  For episode of altered mental status, suspect most likely more of metabolic encephalopathy, possibly underlying urinary tract infection.  I would recommend to rule out other causes of metabolic encephalopathy.  The examination is limited but there is no clear signs to suggest a new cerebrovascular accident has ensued, and a small lacunar thalamic infarct will not cause the patient being lethargic and change in mental status.  infectious workup as needed.  Antibiotics as needed.  For history of dementia, secondary to the patient's age, would recommend supportive therapy.  For history of hypertension, monitor systolic blood pressure.  For history of high cholesterol,  Would recommend statin lipid panel notgd   MRI noted suspect most likly incidental finding no clinical relevance on this admission   For history of TIAs in the past, would recommend to continue the patient on antiplatelets.  Son's name is Wayne, telephone number is [phone], alternate number is [phone] 12/1/2020    Greater than 36 minutes of time was spent with the patient, plan of care, reviewing data, speaking to the family and  50% of the visit was spent counseling and/or coordinating care with multidisciplinary healthcare team

## 2020-12-01 NOTE — PROGRESS NOTE ADULT - SUBJECTIVE AND OBJECTIVE BOX
PROGRESS NOTE LATE ENTRY- patient was seen and examined earlier today . Plan of care was discussed with med staff and unit coordinator .   Patient is a 89y old  Female who presents with a chief complaint of ALTERED MENTAL STATUS (01 Dec 2020 11:48)  Chart and available morning labs /imaging are reviewed electronically , urgent issues addressed . More information  is being added upon completion of rounds , when more information is collected and management discussed with consultants , medical staff and social service/case management on the floor   OVERNIGHT  No new issues reported by medical staff . All above noted Patient is resting in a bed comfortably .Confused ,poor mentation .No distress noted Seen by pt - JEANNINE recommended Son is in agreement   HPI:  89 YO F ,[place] [facility] resident was brought to ER with altered mental status and weakness ,found to have probable UTI Admitted for septic workup and evaluation,send blood and urine cx,serial lactate levels,monitor vitals closley,ivfs hydration,monitor urine output and renal profile,iv abx initiated -started on ceftriaxone in ER and ID CONS requested .CT Brain was done and showed Volume loss, microvascular disease, age indeterminate lacunar infarct including  left ventral medial thalamus, MR sensitive for new ischemia. No acute hemorrhage or midline shift. Right ostiomeatal complex obstruction with right frontal, ethmoid and maxillary mucoceles right maxillary bony hyperostosis,, mass effect as polyp or inverting papilloma not excluded,report was discussed with neurologist and no new interventions recommended ,continue home rx .Patient;s past med hx is significant for Anemia  ,Cognitive deficits  ,constipation ,Depression  ,HLD (hyperlipidemia)  ,HTN (hypertension)  ,TIA (transient ischemic attack)  .Found to have BP elevation and cardiology consult requested for comanagement Admitted  to telemetry unit for monitoring , send 3 sets of cardiac enzymes to rule out acute coronary event, obtain ECHO to evaluate LVEF, cardiology consult  ,continue current management, O2 supply, anticoagulation plan as per cardiology consult COVID PCR sent and currently is pending . Palliative care consult requested ,to discuss advance directives and complete MOLST  (27 Nov 2020 18:15)  PAST MEDICAL & SURGICAL HISTORY:  Constipation  HLD (hyperlipidemia)  Depression  HTN (hypertension)  Cognitive deficits  Weakness  Anemia  TIA (transient ischemic attack)  MEDICATIONS  (STANDING):  amLODIPine   Tablet 5 milliGRAM(s) Oral daily  aspirin enteric coated 81 milliGRAM(s) Oral daily  cefpodoxime 200 milliGRAM(s) Oral every 12 hours  cholecalciferol 1000 Unit(s) Oral daily  dextrose 5% + sodium chloride 0.45%. 1000 milliLiter(s) (50 mL/Hr) IV Continuous <Continuous>  escitalopram 10 milliGRAM(s) Oral daily  heparin   Injectable 5000 Unit(s) SubCutaneous every 12 hours  pantoprazole    Tablet 40 milliGRAM(s) Oral before breakfast  polyethylene glycol 3350 17 Gram(s) Oral daily  simvastatin 40 milliGRAM(s) Oral at bedtime  MEDICATIONS  (PRN):  acetaminophen   Tablet .. 650 milliGRAM(s) Oral every 6 hours PRN Temp greater or equal to 38C (100.4F), Mild Pain (1 - 3)  hydrALAZINE 10 milliGRAM(s) Oral four times a day PRN Systolic blood pressure >180  magnesium hydroxide Suspension 30 milliLiter(s) Oral daily PRN Constipation  melatonin 3 milliGRAM(s) Oral at bedtime PRN Insomnia  OBJECTIVE  T(C): 36.9 (12-01-20 @ 11:49), Max: 36.9 (12-01-20 @ 11:49)  HR: 83 (12-01-20 @ 11:49) (66 - 83)  BP: 158/78 (12-01-20 @ 11:49) (155/79 - 179/80)  RR: 16 (12-01-20 @ 07:25) (16 - 18)  SpO2: 90% (12-01-20 @ 11:49) (90% - 96%)  Wt(kg): --  I&O's Summary    30 Nov 2020 07:01  -  01 Dec 2020 07:00  --------------------------------------------------------  IN: 120 mL / OUT: 300 mL / NET: -180 mL  REVIEW OF SYSTEMS:  ROS is unobtainable due to dementia and confusion PATIENT IS CONFUSED AND IS UNABLE TO GIVE ANY/RELIABLE HISTORY OR REVIEW OF SYSTEMS PLEASE ALSO SEE UNDER HPI AND ASSESSMENT FOR OBTAINED REVIEW OF SYSTEMS   PHYSICAL EXAM:  Appearance: NAD. VS past 24 hrs -as above   HEENT:   Moist oral mucosa. Conjunctiva clear b/l.   Neck : supple  Respiratory: Lungs CTAB.  Gastrointestinal:  Soft, nontender. No rebound. No rigidity. BS present	  Cardiovascular: RRR ,S1S2 present  Neurologic: Non-focal. Moving all extremities.  Extremities: No edema. No erythema. No calf tenderness.  Skin: No rashes, No ecchymoses, No cyanosis.	  wounds ,skin lesions-See skin assesment flow sheet   LABS:                        13.5   11.88 )-----------( 340      ( 01 Dec 2020 09:00 )             41.1   12-01  142  |  107  |  8   ----------------------------<  120<H>  3.4<L>   |  25  |  0.96  Ca    9.1      01 Dec 2020 09:00  Phos  2.8     12-01  Mg     2.1     12-01  CAPILLARY BLOOD GLUCOSE  Culture - Urine (collected 27 Nov 2020 18:31)  Source: .Urine Catheterized  Final Report (29 Nov 2020 13:37):    >100,000 CFU/ml Escherichia coli  Organism: Escherichia coli (29 Nov 2020 13:37)  Organism: Escherichia coli (29 Nov 2020 13:37)  Culture - Blood (collected 27 Nov 2020 18:29)  Source: .Blood Blood-Peripheral  Preliminary Report (28 Nov 2020 19:01):    No growth to date.  Culture - Blood (collected 27 Nov 2020 18:29)  Source: .Blood Blood-Peripheral  Preliminary Report (28 Nov 2020 19:01):    No growth to date.  RADIOLOGY & ADDITIONAL TESTS:< from: MR Angio Head No Cont (11.30.20 @ 19:14) >    INTERPRETATION:  Exam Date: 11/30/2020 7:14 PM    MR angiography of the intracranial circulation.  (Non gadolinium technique.)    CLINICAL INFORMATION:  ams    TECHNIQUE:  MR angiography of intracranial circulation was performed using three-dimensional time of flight technique. Post processing angiographic reconstruction of images was performed. This data set was reconstructed as maximum intensity pixel imagesand displayed in multiple rotations.    FINDINGS:   No comparison similar studies are available.    The anterior circulation demonstrates intact petrous, cavernous and clinoid segments of the internal carotid arteries.  The anterior cerebral arteriesare intact and symmetric. An anterior communicating artery is demonstrated.  The middle cerebral arteries demonstrate intact initial M1 and M2 segments. There are symmetric intact peripheral Sylvian branches.    The posterior circulation demonstratesintact vertebral, basilar and posterior cerebral arteries.    No abnormal vessel termination, vascular malformation or intracranial aneurysm is recognized.      IMPRESSION:  No hemodynamically significant stenosis.    < end of copied text >  < from: MR Head No Cont (11.30.20 @ 19:13) >  EXAM:  MR BRAIN                            PROCEDURE DATE:  11/30/2020          INTERPRETATION:  Exam Date: 11/30/2020 7:13 PM    MR brain  without gadolinium    CLINICAL INFORMATION:  ams    TECHNIQUE:   Sagittal and axial T1-weighted images, axial FLAIR images, gradient echo, axial  T2-weighted images and axial diffusion weighted images of the brain were obtained.    FINDINGS: Comparison is made to CT head of 11/27/2020    There is small focus of restricted diffusion within the anteriomedial left thalamus, suggestive of a small acute lacunar infarct. There is a tiny chronic lacunar infarct in the right corona radiata. Tiny chronic subcortical infarct in the right frontal lobe. There is a small amount of patchy scattered foci of FLAIR hyperintensity in the periventricular subcortical and pontine white matter of the bilateral cerebri, compatible with moderate chronic microvascular ischemic changes. There is no midline shift or herniation pattern. No mass effect is found in the brain.    The ventricles, sulci and basal cisterns appear unremarkable.    The vertebral and internal carotid arteries demonstrate expected flow voids indicating their patency.    Complete opacification of the right maxillary sinus and mucosal inflammation in the right frontal and ethmoid sinus, compatible with severe sinusitis.    IMPRESSION:    Small focus of restricted diffusion within the anteriomedial left thalamus, suggestive of a small acute lacunar infarct. There is a tiny chronic lacunar infarct in the right corona radiata. Tiny chronic subcortical infarct in the right frontal lobe. There is a small amount of patchy scattered foci of FLAIR hyperintensity in the periventricular subcortical and pontine white matter of the bilateral cerebri, compatible with moderate chronic microvascular ischemic changes.    Complete opacification of the right maxillary sinus and mucosal inflammation in the right frontal and ethmoid sinus, compatible with severe sinusitis.    < end of copied text >     reviewed elctronically  ASSESSMENT/PLAN: 	  45minutes spent on this visit, 50% visit time spent in care co-ordination with other attendings and counselling patient  I have discussed care plan with patient and HCP,expressed understanding of problems treatment and their effect and side effects, alternatives in detail,I have asked if they have any questions and concerns and appropriately addressed them to best of my ability Advance care planning was discussed , pallitaive care issues ,CMO ,hospice levels of care were discussed in details , forms ,advance directives were reviewed .All questions were answered to the best of my knowledge . additional 25 min spent.

## 2020-12-01 NOTE — PROGRESS NOTE ADULT - PROBLEM SELECTOR PLAN 1
89 YO F ,[place] [facility] resident was brought to ER with altered mental status and weakness ,found to have probable UTI Admitted for septic workup   on Dysphagia Diet - asp prec - HOB Elev - oral hygiene  on ABX - for poss UTI - cx noted - UA noted - labs reviewed -   SLP eval  - Dysphagia II diet - HOB elev - oral hygiene -   on gentle hydration at present  GOC discussion - spoke with Son - three children all together - Son who lives in [place] does not know if mom is DNR - he will speak with his brothers.   TTE report reviewed -.   MRI report reviewed - lacunar infarct - chr changes -

## 2020-12-01 NOTE — PROGRESS NOTE ADULT - SUBJECTIVE AND OBJECTIVE BOX
Date/Time Patient Seen:  		  Referring MD:   Data Reviewed	       Patient is a 89y old  Female who presents with a chief complaint of ALTERED MENTAL STATUS (30 Nov 2020 20:09)      Subjective/HPI     PAST MEDICAL & SURGICAL HISTORY:  Constipation    HLD (hyperlipidemia)    Depression    HTN (hypertension)    Cognitive deficits    Weakness    Anemia    TIA (transient ischemic attack)          Medication list         MEDICATIONS  (STANDING):  amLODIPine   Tablet 5 milliGRAM(s) Oral daily  aspirin enteric coated 81 milliGRAM(s) Oral daily  cefpodoxime 200 milliGRAM(s) Oral every 12 hours  cholecalciferol 1000 Unit(s) Oral daily  dextrose 5% + sodium chloride 0.45%. 1000 milliLiter(s) (50 mL/Hr) IV Continuous <Continuous>  escitalopram 10 milliGRAM(s) Oral daily  heparin   Injectable 5000 Unit(s) SubCutaneous every 12 hours  pantoprazole    Tablet 40 milliGRAM(s) Oral before breakfast  polyethylene glycol 3350 17 Gram(s) Oral daily    MEDICATIONS  (PRN):  acetaminophen   Tablet .. 650 milliGRAM(s) Oral every 6 hours PRN Temp greater or equal to 38C (100.4F), Mild Pain (1 - 3)  hydrALAZINE 10 milliGRAM(s) Oral four times a day PRN Systolic blood pressure >180  magnesium hydroxide Suspension 30 milliLiter(s) Oral daily PRN Constipation  melatonin 3 milliGRAM(s) Oral at bedtime PRN Insomnia         Vitals log        ICU Vital Signs Last 24 Hrs  T(C): 36.6 (01 Dec 2020 04:21), Max: 36.8 (30 Nov 2020 20:11)  T(F): 97.8 (01 Dec 2020 04:21), Max: 98.3 (01 Dec 2020 00:46)  HR: 66 (01 Dec 2020 04:21) (66 - 72)  BP: 163/73 (01 Dec 2020 04:21) (119/73 - 179/80)  BP(mean): --  ABP: --  ABP(mean): --  RR: 18 (01 Dec 2020 04:21) (14 - 19)  SpO2: 96% (01 Dec 2020 04:21) (91% - 96%)           Input and Output:  I&O's Detail    30 Nov 2020 07:01  -  01 Dec 2020 07:00  --------------------------------------------------------  IN:    Oral Fluid: 120 mL  Total IN: 120 mL    OUT:    Voided (mL): 300 mL  Total OUT: 300 mL    Total NET: -180 mL          Lab Data                        13.1   13.47 )-----------( 370      ( 30 Nov 2020 06:05 )             39.2     11-30    142  |  105  |  9   ----------------------------<  118<H>  3.1<L>   |  29  |  1.10    Ca    8.9      30 Nov 2020 06:05              Review of Systems	      Objective     Physical Examination    heart s1s2  lung dec BS  abd soft      Pertinent Lab findings & Imaging      Benny:  NO   Adequate UO     I&O's Detail    30 Nov 2020 07:01  -  01 Dec 2020 07:00  --------------------------------------------------------  IN:    Oral Fluid: 120 mL  Total IN: 120 mL    OUT:    Voided (mL): 300 mL  Total OUT: 300 mL    Total NET: -180 mL               Discussed with:     Cultures:	        Radiology

## 2020-12-02 VITALS
OXYGEN SATURATION: 93 % | RESPIRATION RATE: 18 BRPM | TEMPERATURE: 98 F | DIASTOLIC BLOOD PRESSURE: 62 MMHG | HEART RATE: 67 BPM | SYSTOLIC BLOOD PRESSURE: 158 MMHG

## 2020-12-02 LAB
A1C WITH ESTIMATED AVERAGE GLUCOSE RESULT: 6 % — HIGH (ref 4–5.6)
ANION GAP SERPL CALC-SCNC: 7 MMOL/L — SIGNIFICANT CHANGE UP (ref 5–17)
BUN SERPL-MCNC: 17 MG/DL — SIGNIFICANT CHANGE UP (ref 7–23)
CALCIUM SERPL-MCNC: 9.1 MG/DL — SIGNIFICANT CHANGE UP (ref 8.5–10.1)
CHLORIDE SERPL-SCNC: 106 MMOL/L — SIGNIFICANT CHANGE UP (ref 96–108)
CO2 SERPL-SCNC: 30 MMOL/L — SIGNIFICANT CHANGE UP (ref 22–31)
CREAT SERPL-MCNC: 1.3 MG/DL — SIGNIFICANT CHANGE UP (ref 0.5–1.3)
CULTURE RESULTS: SIGNIFICANT CHANGE UP
CULTURE RESULTS: SIGNIFICANT CHANGE UP
ESTIMATED AVERAGE GLUCOSE: 126 MG/DL — HIGH (ref 68–114)
GLUCOSE SERPL-MCNC: 104 MG/DL — HIGH (ref 70–99)
HCT VFR BLD CALC: 38.4 % — SIGNIFICANT CHANGE UP (ref 34.5–45)
HGB BLD-MCNC: 12.6 G/DL — SIGNIFICANT CHANGE UP (ref 11.5–15.5)
MCHC RBC-ENTMCNC: 30.3 PG — SIGNIFICANT CHANGE UP (ref 27–34)
MCHC RBC-ENTMCNC: 32.8 GM/DL — SIGNIFICANT CHANGE UP (ref 32–36)
MCV RBC AUTO: 92.3 FL — SIGNIFICANT CHANGE UP (ref 80–100)
NRBC # BLD: 0 /100 WBCS — SIGNIFICANT CHANGE UP (ref 0–0)
PLATELET # BLD AUTO: 346 K/UL — SIGNIFICANT CHANGE UP (ref 150–400)
POTASSIUM SERPL-MCNC: 4.1 MMOL/L — SIGNIFICANT CHANGE UP (ref 3.5–5.3)
POTASSIUM SERPL-SCNC: 4.1 MMOL/L — SIGNIFICANT CHANGE UP (ref 3.5–5.3)
PROCALCITONIN SERPL-MCNC: 0.09 NG/ML — HIGH (ref 0–0.04)
RBC # BLD: 4.16 M/UL — SIGNIFICANT CHANGE UP (ref 3.8–5.2)
RBC # FLD: 13.9 % — SIGNIFICANT CHANGE UP (ref 10.3–14.5)
SODIUM SERPL-SCNC: 143 MMOL/L — SIGNIFICANT CHANGE UP (ref 135–145)
SPECIMEN SOURCE: SIGNIFICANT CHANGE UP
SPECIMEN SOURCE: SIGNIFICANT CHANGE UP
WBC # BLD: 15.21 K/UL — HIGH (ref 3.8–10.5)
WBC # FLD AUTO: 15.21 K/UL — HIGH (ref 3.8–10.5)

## 2020-12-02 RX ADMIN — Medication 1000 UNIT(S): at 11:45

## 2020-12-02 RX ADMIN — Medication 200 MILLIGRAM(S): at 05:59

## 2020-12-02 RX ADMIN — HEPARIN SODIUM 5000 UNIT(S): 5000 INJECTION INTRAVENOUS; SUBCUTANEOUS at 05:59

## 2020-12-02 RX ADMIN — ESCITALOPRAM OXALATE 10 MILLIGRAM(S): 10 TABLET, FILM COATED ORAL at 11:45

## 2020-12-02 RX ADMIN — Medication 81 MILLIGRAM(S): at 11:45

## 2020-12-02 RX ADMIN — PANTOPRAZOLE SODIUM 40 MILLIGRAM(S): 20 TABLET, DELAYED RELEASE ORAL at 06:06

## 2020-12-02 RX ADMIN — Medication 1 TABLET(S): at 11:48

## 2020-12-02 RX ADMIN — POLYETHYLENE GLYCOL 3350 17 GRAM(S): 17 POWDER, FOR SOLUTION ORAL at 11:45

## 2020-12-02 RX ADMIN — AMLODIPINE BESYLATE 5 MILLIGRAM(S): 2.5 TABLET ORAL at 05:59

## 2020-12-02 NOTE — PROGRESS NOTE ADULT - PROBLEM SELECTOR PROBLEM 1
Palliative care encounter
Altered mental status

## 2020-12-02 NOTE — PROGRESS NOTE ADULT - SUBJECTIVE AND OBJECTIVE BOX
Neurology follow up note    TASH [id number]      Interval History:    Patient feels ok no new complaints.    MEDICATIONS    acetaminophen   Tablet .. 650 milliGRAM(s) Oral every 6 hours PRN  amLODIPine   Tablet 5 milliGRAM(s) Oral daily  aspirin enteric coated 81 milliGRAM(s) Oral daily  cefpodoxime 200 milliGRAM(s) Oral every 12 hours  cholecalciferol 1000 Unit(s) Oral daily  escitalopram 10 milliGRAM(s) Oral daily  heparin   Injectable 5000 Unit(s) SubCutaneous every 12 hours  hydrALAZINE 10 milliGRAM(s) Oral four times a day PRN  lactobacillus acidophilus 1 Tablet(s) Oral daily  magnesium hydroxide Suspension 30 milliLiter(s) Oral daily PRN  melatonin 3 milliGRAM(s) Oral at bedtime PRN  pantoprazole    Tablet 40 milliGRAM(s) Oral before breakfast  polyethylene glycol 3350 17 Gram(s) Oral daily  simvastatin 40 milliGRAM(s) Oral at bedtime  sodium chloride 0.65% Nasal 1 Spray(s) Right Nostril two times a day      Allergies    No Known Allergies    Intolerances            Vital Signs Last 24 Hrs  T(C): 36.7 (02 Dec 2020 07:40), Max: 36.9 (01 Dec 2020 11:49)  T(F): 98 (02 Dec 2020 07:40), Max: 98.5 (01 Dec 2020 11:49)  HR: 65 (02 Dec 2020 07:40) (63 - 83)  BP: 139/66 (02 Dec 2020 07:40) (139/66 - 158/78)  BP(mean): --  RR: 15 (02 Dec 2020 07:40) (15 - 18)  SpO2: 98% (02 Dec 2020 07:40) (90% - 98%)      REVIEW OF SYSTEMS:  Very limited secondary to the patient has underlying dementia, is lethargic, but presented to the hospital secondary to decreased responsiveness and mental status       PHYSICAL EXAMINATION:  HEENT:  Head:  Normocephalic, atraumatic.  Eyes:  No scleral icterus.  Ears:  Hard to evaluate secondary to the patient to be lethargic.  NECK:  Supple.  RESPIRATORY:  Decreased breath sounds bilaterally, but gives poor effort.  CARDIOVASCULAR:  S1 and S2 heard.  ABDOMEN:  Soft and nontender.  EXTREMITIES:  No clubbing or cyanosis were noted.      NEUROLOGIC:  The patient was awake, the patient  opened eyes   Speech:  The patient would articulate one to two words.  No dysarthria.  Motor examination is very limited secondary to the patient's uncooperative examination.  Attempted to pull up bilateral upper extremities, the patient would actively resist, pull both arms down, would say overall 3/5, bilateral lower extremities with plantar stimulation, positive flexation at the hip and knee, would say 3/5, did not notice any focal gross asymmetric movements.                   LABS:  CBC Full  -  ( 02 Dec 2020 08:42 )  WBC Count : 15.21 K/uL  RBC Count : 4.16 M/uL  Hemoglobin : 12.6 g/dL  Hematocrit : 38.4 %  Platelet Count - Automated : 346 K/uL  Mean Cell Volume : 92.3 fl  Mean Cell Hemoglobin : 30.3 pg  Mean Cell Hemoglobin Concentration : 32.8 gm/dL  Auto Neutrophil # : x  Auto Lymphocyte # : x  Auto Monocyte # : x  Auto Eosinophil # : x  Auto Basophil # : x  Auto Neutrophil % : x  Auto Lymphocyte % : x  Auto Monocyte % : x  Auto Eosinophil % : x  Auto Basophil % : x      12-02    143  |  106  |  17  ----------------------------<  104<H>  4.1   |  30  |  1.30    Ca    9.1      02 Dec 2020 08:42  Phos  2.8     12-01  Mg     2.1     12-01      Hemoglobin A1C:       Vitamin B12         RADIOLOGY      ANALYSIS AND PLAN:  A 89-year-old with an episode of altered mental status.  For episode of altered mental status, suspect most likely more of metabolic encephalopathy, possibly underlying urinary tract infection.  I would recommend to rule out other causes of metabolic encephalopathy.  The examination is limited but there is no clear signs to suggest a new cerebrovascular accident has ensued, and a small lacunar thalamic infarct will not cause the patient being lethargic and change in mental status.  infectious workup as needed.  Antibiotics as needed.  For history of dementia, secondary to the patient's age, would recommend supportive therapy.  For history of hypertension, monitor systolic blood pressure.  For history of high cholesterol,  statin   MRI noted suspect most likly incidental finding no clinical relevance on this admission   For history of TIAs in the past, would recommend to continue the patient on antiplatelets.  Son's name is Wayne, telephone number is [phone], alternate number is [phone] 12/1/2020    Greater than 30 minutes of time was spent with the patient, plan of care, reviewing data, speaking to the family and  50% of the visit was spent counseling and/or coordinating care with multidisciplinary healthcare team

## 2020-12-02 NOTE — PROGRESS NOTE ADULT - SUBJECTIVE AND OBJECTIVE BOX
Patient is a 89y Female with a known history of :  CVA (cerebral vascular accident) [I63.9]    Sinus disorder [J34.9]    Hypokalemia [E87.6]    Dysphagia [R13.10]    Palliative care encounter [Z51.5]    Prophylactic measure [Z29.9]    Constipation [K59.00]    Weakness [R53.1]    Depression [F32.9]    Abnormal CT scan of head [R93.0]    HTN (hypertension) [I10]    UTI (urinary tract infection) [N39.0]    Altered mental status [R41.82]      HPI:  89 YO F ,[facility] [facility] resident was brought to ER with altered mental status and weakness ,found to have probable UTI Admitted for septic workup and evaluation,send blood and urine cx,serial lactate levels,monitor vitals closley,ivfs hydration,monitor urine output and renal profile,iv abx initiated -started on ceftriaxone in ER and ID CONS requested .CT Brain was done and showed Volume loss, microvascular disease, age indeterminate lacunar infarct including  left ventral medial thalamus, MR sensitive for new ischemia. No acute hemorrhage or midline shift. Right ostiomeatal complex obstruction with right frontal, ethmoid and maxillary mucoceles right maxillary bony hyperostosis,, mass effect as polyp or inverting papilloma not excluded,report was discussed with neurologist and no new interventions recommended ,continue home rx .Patient;s past med hx is significant for Anemia  ,Cognitive deficits  ,constipation ,Depression  ,HLD (hyperlipidemia)  ,HTN (hypertension)  ,TIA (transient ischemic attack)  .Found to have BP elevation and cardiology consult requested for comanagement Admitted  to telemetry unit for monitoring , send 3 sets of cardiac enzymes to rule out acute coronary event, obtain ECHO to evaluate LVEF, cardiology consult  ,continue current management, O2 supply, anticoagulation plan as per cardiology consult COVID PCR sent and currently is pending . Palliative care consult requested ,to discuss advance directives and complete MOLST  (27 Nov 2020 18:15)      REVIEW OF SYSTEMS:    CONSTITUTIONAL: No fever, weight loss, or fatigue  EYES: No eye pain, visual disturbances, or discharge  ENMT:  No difficulty hearing, tinnitus, vertigo; No sinus or throat pain  NECK: No pain or stiffness  BREASTS: No pain, masses, or nipple discharge  RESPIRATORY: No cough, wheezing, chills or hemoptysis; No shortness of breath  CARDIOVASCULAR: No chest pain, palpitations, dizziness, or leg swelling  GASTROINTESTINAL: No abdominal or epigastric pain. No nausea, vomiting, or hematemesis; No diarrhea or constipation. No melena or hematochezia.  GENITOURINARY: No dysuria, frequency, hematuria, or incontinence  NEUROLOGICAL: No headaches, memory loss, loss of strength, numbness, or tremors  SKIN: No itching, burning, rashes, or lesions   LYMPH NODES: No enlarged glands  ENDOCRINE: No heat or cold intolerance; No hair loss  MUSCULOSKELETAL: No joint pain or swelling; No muscle, back, or extremity pain  PSYCHIATRIC: No depression, anxiety, mood swings, or difficulty sleeping  HEME/LYMPH: No easy bruising, or bleeding gums  ALLERGY AND IMMUNOLOGIC: No hives or eczema    MEDICATIONS  (STANDING):  amLODIPine   Tablet 5 milliGRAM(s) Oral daily  aspirin enteric coated 81 milliGRAM(s) Oral daily  cefpodoxime 200 milliGRAM(s) Oral every 12 hours  cholecalciferol 1000 Unit(s) Oral daily  escitalopram 10 milliGRAM(s) Oral daily  heparin   Injectable 5000 Unit(s) SubCutaneous every 12 hours  lactobacillus acidophilus 1 Tablet(s) Oral daily  pantoprazole    Tablet 40 milliGRAM(s) Oral before breakfast  polyethylene glycol 3350 17 Gram(s) Oral daily  simvastatin 40 milliGRAM(s) Oral at bedtime  sodium chloride 0.65% Nasal 1 Spray(s) Right Nostril two times a day    MEDICATIONS  (PRN):  acetaminophen   Tablet .. 650 milliGRAM(s) Oral every 6 hours PRN Temp greater or equal to 38C (100.4F), Mild Pain (1 - 3)  hydrALAZINE 10 milliGRAM(s) Oral four times a day PRN Systolic blood pressure >180  magnesium hydroxide Suspension 30 milliLiter(s) Oral daily PRN Constipation  melatonin 3 milliGRAM(s) Oral at bedtime PRN Insomnia      ALLERGIES: No Known Allergies      FAMILY HISTORY:      PHYSICAL EXAMINATION:  -----------------------------  T(C): 36.7 (12-02-20 @ 07:40), Max: 36.9 (12-01-20 @ 11:49)  HR: 65 (12-02-20 @ 07:40) (63 - 83)  BP: 139/66 (12-02-20 @ 07:40) (139/66 - 158/78)  RR: 15 (12-02-20 @ 07:40) (15 - 18)  SpO2: 98% (12-02-20 @ 07:40) (90% - 98%)  Wt(kg): --    12-01 @ 07:01  -  12-02 @ 07:00  --------------------------------------------------------  IN:    Oral Fluid: 240 mL  Total IN: 240 mL    OUT:  Total OUT: 0 mL    Total NET: 240 mL            VITALS  T(C): 36.7 (12-02-20 @ 07:40), Max: 36.9 (12-01-20 @ 11:49)  HR: 65 (12-02-20 @ 07:40) (63 - 83)  BP: 139/66 (12-02-20 @ 07:40) (139/66 - 158/78)  RR: 15 (12-02-20 @ 07:40) (15 - 18)  SpO2: 98% (12-02-20 @ 07:40) (90% - 98%)    Constitutional: well developed, normal appearance, well groomed, well nourished, no deformities and no acute distress.   Eyes: the conjunctiva exhibited no abnormalities and the eyelids demonstrated no xanthelasmas.   HEENT: normal oral mucosa, no oral pallor and no oral cyanosis.   Neck: normal jugular venous A waves present, normal jugular venous V waves present and no jugular venous blake A waves.   Pulmonary: no respiratory distress, normal respiratory rhythm and effort, no accessory muscle use and lungs were clear to auscultation bilaterally.   Cardiovascular: heart rate and rhythm were normal, normal S1 and S2 and no murmur, gallop, rub, heave or thrill are present.   Abdomen: soft, non-tender, no hepato-splenomegaly and no abdominal mass palpated.   Musculoskeletal: the gait could not be assessed..   Extremities: no clubbing of the fingernails, no localized cyanosis, no petechial hemorrhages and no ischemic changes.   Skin: normal skin color and pigmentation, no rash, no venous stasis, no skin lesions, no skin ulcer and no xanthoma was observed.   Psychiatric: oriented to person, place, and time, the affect was normal, the mood was normal and not feeling anxious.     LABS:   --------  12-02    143  |  106  |  17  ----------------------------<  104<H>  4.1   |  30  |  1.30    Ca    9.1      02 Dec 2020 08:42  Phos  2.8     12-01  Mg     2.1     12-01                           12.6   15.21 )-----------( 346      ( 02 Dec 2020 08:42 )             38.4                 RADIOLOGY:  -----------------    ECG:     ECHO:

## 2020-12-02 NOTE — PROGRESS NOTE ADULT - PROBLEM SELECTOR PLAN 1
LIKELY 2/2 TO ACUTE METABOLIC ENCEPHALOPATHY SECONDARY TO UTI AND R SINUSITIS  ,POA .PROGRESSION /ONSET OF DEMENTIA MAY BE CONTRIBUTING FACTOR .NEUROLOGY CONS REQUESTED TO RULE OUT ACUTE CVA AND MRI showed  left thalamus  small acute lacunar infarct .Son is aware of findings .As per neurologist it is old infarct and PATIENT WAS RULED OUT FOR ACUTE CVA .

## 2020-12-02 NOTE — PROGRESS NOTE ADULT - SUBJECTIVE AND OBJECTIVE BOX
Date/Time Patient Seen:  		  Referring MD:   Data Reviewed	       Patient is a 89y old  Female who presents with a chief complaint of ALTERED MENTAL STATUS (01 Dec 2020 18:26)      Subjective/HPI     PAST MEDICAL & SURGICAL HISTORY:  Constipation    HLD (hyperlipidemia)    Depression    HTN (hypertension)    Cognitive deficits    Weakness    Anemia    TIA (transient ischemic attack)          Medication list         MEDICATIONS  (STANDING):  amLODIPine   Tablet 5 milliGRAM(s) Oral daily  aspirin enteric coated 81 milliGRAM(s) Oral daily  cefpodoxime 200 milliGRAM(s) Oral every 12 hours  cholecalciferol 1000 Unit(s) Oral daily  escitalopram 10 milliGRAM(s) Oral daily  heparin   Injectable 5000 Unit(s) SubCutaneous every 12 hours  lactobacillus acidophilus 1 Tablet(s) Oral daily  pantoprazole    Tablet 40 milliGRAM(s) Oral before breakfast  polyethylene glycol 3350 17 Gram(s) Oral daily  simvastatin 40 milliGRAM(s) Oral at bedtime  sodium chloride 0.65% Nasal 1 Spray(s) Right Nostril two times a day    MEDICATIONS  (PRN):  acetaminophen   Tablet .. 650 milliGRAM(s) Oral every 6 hours PRN Temp greater or equal to 38C (100.4F), Mild Pain (1 - 3)  hydrALAZINE 10 milliGRAM(s) Oral four times a day PRN Systolic blood pressure >180  magnesium hydroxide Suspension 30 milliLiter(s) Oral daily PRN Constipation  melatonin 3 milliGRAM(s) Oral at bedtime PRN Insomnia         Vitals log        ICU Vital Signs Last 24 Hrs  T(C): 36.7 (02 Dec 2020 07:40), Max: 36.9 (01 Dec 2020 11:49)  T(F): 98 (02 Dec 2020 07:40), Max: 98.5 (01 Dec 2020 11:49)  HR: 65 (02 Dec 2020 07:40) (63 - 83)  BP: 139/66 (02 Dec 2020 07:40) (139/66 - 158/78)  BP(mean): --  ABP: --  ABP(mean): --  RR: 15 (02 Dec 2020 07:40) (15 - 18)  SpO2: 98% (02 Dec 2020 07:40) (90% - 98%)           Input and Output:  I&O's Detail    01 Dec 2020 07:01  -  02 Dec 2020 07:00  --------------------------------------------------------  IN:    Oral Fluid: 240 mL  Total IN: 240 mL    OUT:  Total OUT: 0 mL    Total NET: 240 mL          Lab Data                        13.5   11.88 )-----------( 340      ( 01 Dec 2020 09:00 )             41.1     12-01    142  |  107  |  8   ----------------------------<  120<H>  3.4<L>   |  25  |  0.96    Ca    9.1      01 Dec 2020 09:00  Phos  2.8     12-01  Mg     2.1     12-01              Review of Systems	      Objective     Physical Examination    heart s1s2  lung dec BS  abd soft      Pertinent Lab findings & Imaging      Benny:  NO   Adequate UO     I&O's Detail    01 Dec 2020 07:01  -  02 Dec 2020 07:00  --------------------------------------------------------  IN:    Oral Fluid: 240 mL  Total IN: 240 mL    OUT:  Total OUT: 0 mL    Total NET: 240 mL               Discussed with:     Cultures:	        Radiology

## 2020-12-02 NOTE — PROGRESS NOTE ADULT - SUBJECTIVE AND OBJECTIVE BOX
PROGRESS NOTE  Patient is a 89y old  Female who presents with a chief complaint of ALTERED MENTAL STATUS (02 Dec 2020 11:39)  Chart and available morning labs /imaging are reviewed electronically , urgent issues addressed . More information  is being added upon completion of rounds , when more information is collected and management discussed with consultants , medical staff and social service/case management on the floor   OVERNIGHT  No new issues reported by medical staff . All above noted Patient is resting in a bed comfortably .Confused ,poor mentation .No distress noted   WBC elevation noted ,as per ID -ok to d/c on po vantin F/up with PCP in [facility], [place] on admission ,check CBC IN am  .COVID is negative .outpatient ENT appointment is recommended and will be scheduled by STR   HPI:  89 YO F ,[place] [facility] resident was brought to ER with altered mental status and weakness ,found to have probable UTI Admitted for septic workup and evaluation,send blood and urine cx,serial lactate levels,monitor vitals closley,ivfs hydration,monitor urine output and renal profile,iv abx initiated -started on ceftriaxone in ER and ID CONS requested .CT Brain was done and showed Volume loss, microvascular disease, age indeterminate lacunar infarct including  left ventral medial thalamus, MR sensitive for new ischemia. No acute hemorrhage or midline shift. Right ostiomeatal complex obstruction with right frontal, ethmoid and maxillary mucoceles right maxillary bony hyperostosis,, mass effect as polyp or inverting papilloma not excluded,report was discussed with neurologist and no new interventions recommended ,continue home rx .Patient;s past med hx is significant for Anemia  ,Cognitive deficits  ,constipation ,Depression  ,HLD (hyperlipidemia)  ,HTN (hypertension)  ,TIA (transient ischemic attack)  .Found to have BP elevation and cardiology consult requested for comanagement Admitted  to telemetry unit for monitoring , send 3 sets of cardiac enzymes to rule out acute coronary event, obtain ECHO to evaluate LVEF, cardiology consult  ,continue current management, O2 supply, anticoagulation plan as per cardiology consult COVID PCR sent and currently is pending . Palliative care consult requested ,to discuss advance directives and complete MOLST  (27 Nov 2020 18:15)    PAST MEDICAL & SURGICAL HISTORY:  Constipation    HLD (hyperlipidemia)    Depression    HTN (hypertension)    Cognitive deficits    Weakness    Anemia    TIA (transient ischemic attack)        MEDICATIONS  (STANDING):  amLODIPine   Tablet 5 milliGRAM(s) Oral daily  aspirin enteric coated 81 milliGRAM(s) Oral daily  cefpodoxime 200 milliGRAM(s) Oral every 12 hours  cholecalciferol 1000 Unit(s) Oral daily  escitalopram 10 milliGRAM(s) Oral daily  heparin   Injectable 5000 Unit(s) SubCutaneous every 12 hours  lactobacillus acidophilus 1 Tablet(s) Oral daily  pantoprazole    Tablet 40 milliGRAM(s) Oral before breakfast  polyethylene glycol 3350 17 Gram(s) Oral daily  simvastatin 40 milliGRAM(s) Oral at bedtime  sodium chloride 0.65% Nasal 1 Spray(s) Right Nostril two times a day    MEDICATIONS  (PRN):  acetaminophen   Tablet .. 650 milliGRAM(s) Oral every 6 hours PRN Temp greater or equal to 38C (100.4F), Mild Pain (1 - 3)  hydrALAZINE 10 milliGRAM(s) Oral four times a day PRN Systolic blood pressure >180  magnesium hydroxide Suspension 30 milliLiter(s) Oral daily PRN Constipation  melatonin 3 milliGRAM(s) Oral at bedtime PRN Insomnia      OBJECTIVE    T(C): 36.7 (12-02-20 @ 07:40), Max: 36.9 (12-01-20 @ 19:37)  HR: 65 (12-02-20 @ 07:40) (63 - 83)  BP: 139/66 (12-02-20 @ 07:40) (139/66 - 154/69)  RR: 15 (12-02-20 @ 07:40) (15 - 18)  SpO2: 98% (12-02-20 @ 07:40) (92% - 98%)  Wt(kg): --  I&O's Summary    01 Dec 2020 07:01  -  02 Dec 2020 07:00  --------------------------------------------------------  IN: 240 mL / OUT: 0 mL / NET: 240 mL          REVIEW OF SYSTEMS:  CONSTITUTIONAL: No fever, weight loss, or fatigue  ROS is unobtainable due to dementia and confusion PATIENT IS CONFUSED AND IS UNABLE TO GIVE ANY/RELIABLE HISTORY OR REVIEW OF SYSTEMS PLEASE ALSO SEE UNDER HPI AND ASSESSMENT FOR OBTAINED REVIEW OF SYSTEMS     PHYSICAL EXAM:  Appearance: NAD. VS past 24 hrs -as above   HEENT:   Moist oral mucosa. Conjunctiva clear b/l. No sinus tenderness   Neck : supple  Respiratory: Lungs CTAB.  Gastrointestinal:  Soft, nontender. No rebound. No rigidity. BS present	  Cardiovascular: RRR ,S1S2 present  Neurologic: Non-focal. Moving all extremities.  Extremities: No edema. No erythema. No calf tenderness.  Skin: No rashes, No ecchymoses, No cyanosis.	  wounds ,skin lesions-See skin assesment flow sheet   LABS:                        12.6   15.21 )-----------( 346      ( 02 Dec 2020 08:42 )             38.4     12-02    143  |  106  |  17  ----------------------------<  104<H>  4.1   |  30  |  1.30    Ca    9.1      02 Dec 2020 08:42  Phos  2.8     12-01  Mg     2.1     12-01      CAPILLARY BLOOD GLUCOSE              Culture - Urine (collected 27 Nov 2020 18:31)  Source: .Urine Catheterized  Final Report (29 Nov 2020 13:37):    >100,000 CFU/ml Escherichia coli  Organism: Escherichia coli (29 Nov 2020 13:37)  Organism: Escherichia coli (29 Nov 2020 13:37)    Culture - Blood (collected 27 Nov 2020 18:29)  Source: .Blood Blood-Peripheral  Preliminary Report (28 Nov 2020 19:01):    No growth to date.    Culture - Blood (collected 27 Nov 2020 18:29)  Source: .Blood Blood-Peripheral  Preliminary Report (28 Nov 2020 19:01):    No growth to date.      RADIOLOGY & ADDITIONAL TESTS:   reviewed elctronically  ASSESSMENT/PLAN: 	  Patient was seen and examined on a day of discharge . Plan of care , discharge medications and recommendations discussed with consultants and clearance for discharge obtained .Social service , case management  and medical staff are aware of plan. Family is notified. Discharge summary  is  prepared electronically 75minutes spent on this visit, 50% visit time spent in care co-ordination with other attendings and counselling patient  I have discussed care plan with patient and HCP,expressed understanding of problems treatment and their effect and side effects, alternatives in detail,I have asked if they have any questions and concerns and appropriately addressed them to best of my ability

## 2020-12-02 NOTE — PROGRESS NOTE ADULT - PROBLEM SELECTOR PLAN 1
covid PCR neg on 12 1 2020  89 YO F ,[facility] resident was brought to ER with altered mental status and weakness ,found to have probable UTI Admitted for septic workup   on Dysphagia Diet - asp prec - HOB Elev - oral hygiene  on ABX - for poss UTI - cx noted - UA noted - labs reviewed -   SLP eval  - Dysphagia II diet - HOB elev - oral hygiene -   on gentle hydration at present  GOC discussion - spoke with Son - three children all together - Son who lives in [place] does not know if mom is DNR - he will speak with his brothers.   TTE report reviewed -.   MRI report reviewed - lacunar infarct - chr changes -.

## 2020-12-02 NOTE — PROGRESS NOTE ADULT - ATTENDING COMMENTS
89 YO F ,[facility] resident was brought to ER with altered mental status and weakness ,found to have probable UTI Admitted for septic workup and evaluation,send blood and urine cx,serial lactate levels,monitor vitals closley,ivfs hydration,monitor urine output and renal profile,iv abx initiated -started on ceftriaxone in ER and ID CONS requested .CT Brain was done and showed Volume loss, microvascular disease, age indeterminate lacunar infarct including  left ventral medial thalamus, MR sensitive for new ischemia. No acute hemorrhage or midline shift. Right ostiomeatal complex obstruction with right frontal, ethmoid and maxillary mucoceles right maxillary bony hyperostosis,, mass effect as polyp or inverting papilloma not excluded,report was discussed with neurologist and no new interventions recommended ,continue home rx .Patient;s past med hx is significant for Anemia  ,Cognitive deficits  ,constipation ,Depression  ,HLD (hyperlipidemia)  ,HTN (hypertension)  ,TIA (transient ischemic attack)  .Found to have BP elevation and cardiology consult requested for comanagement Admitted  to telemetry unit for monitoring , send 3 sets of cardiac enzymes to rule out acute coronary event, obtain ECHO to evaluate LVEF, cardiology consult  ,continue current management, O2 supply, anticoagulation plan as per cardiology consult Palliative care consult requested ,to discuss advance directives and complete MOLST

## 2020-12-02 NOTE — PROGRESS NOTE ADULT - REASON FOR ADMISSION
ALTERED MENTAL STATUS

## 2020-12-02 NOTE — DISCHARGE NOTE NURSING/CASE MANAGEMENT/SOCIAL WORK - PATIENT PORTAL LINK FT
You can access the FollowMyHealth Patient Portal offered by [facility] by registering at the following website: [email or web address]. By joining [employer]’s FollowMyHealth portal, you will also be able to view your health information using other applications (apps) compatible with our system.

## 2020-12-02 NOTE — PROGRESS NOTE ADULT - ASSESSMENT
89 YO F ,[facility] [facility] resident was brought to ER with altered mental status and weakness ,found to have probable UTI Admitted for septic workup and evaluation,send blood and urine cx,serial lactate levels,monitor vitals closley,ivfs hydration,monitor urine output and renal profile,iv abx initiated -started on ceftriaxone in ER and ID CONS requested .CT Brain was done and showed Volume loss, microvascular disease, age indeterminate lacunar infarct including  left ventral medial thalamus, MR sensitive for new ischemia. No acute hemorrhage or midline shift. Right ostiomeatal complex obstruction with right frontal, ethmoid and maxillary mucoceles right maxillary bony hyperostosis,, mass effect as polyp or inverting papilloma not excluded,report was discussed with neurologist and no new interventions recommended ,continue home rx .Patient;s past med hx is significant for Anemia  ,Cognitive deficits  ,constipation ,Depression  ,HLD (hyperlipidemia)  ,HTN (hypertension)  ,TIA (transient ischemic attack)  .Found to have BP elevation and cardiology consult requested for comanagement Admitted  to telemetry unit for monitoring , send 3 sets of cardiac enzymes to rule out acute coronary event, obtain ECHO to evaluate LVEF, cardiology consult  ,continue current management, O2 supply, anticoagulation plan as per cardiology consult Palliative care consult requested ,to discuss advance directives and complete MOLST

## 2020-12-02 NOTE — DISCHARGE NOTE NURSING/CASE MANAGEMENT/SOCIAL WORK - NSDCPEPTSTRK_GEN_ALL_CORE
Prescribed medications/Risk factors for stroke/Call 911 for stroke/Stroke education booklet/Need for follow up after discharge/Stroke support groups for patients, families, and friends/Stroke warning signs and symptoms/Signs and symptoms of stroke

## 2020-12-02 NOTE — DISCHARGE NOTE NURSING/CASE MANAGEMENT/SOCIAL WORK - NSDCFUADDAPPT_GEN_ALL_CORE_FT
PLEASE SCHEDULE ENT APPOINTMENT WITH DR.JAY SPARKS  ,PATIENT HAS SEVERE R SINUS DISEASE ( SEE CT AND MRI REPORTS ATTACHED )

## 2020-12-28 PROCEDURE — 92610 EVALUATE SWALLOWING FUNCTION: CPT

## 2020-12-28 PROCEDURE — 86769 SARS-COV-2 COVID-19 ANTIBODY: CPT

## 2020-12-28 PROCEDURE — 84443 ASSAY THYROID STIM HORMONE: CPT

## 2020-12-28 PROCEDURE — 70450 CT HEAD/BRAIN W/O DYE: CPT

## 2020-12-28 PROCEDURE — 93005 ELECTROCARDIOGRAM TRACING: CPT

## 2020-12-28 PROCEDURE — 80061 LIPID PANEL: CPT

## 2020-12-28 PROCEDURE — 97162 PT EVAL MOD COMPLEX 30 MIN: CPT

## 2020-12-28 PROCEDURE — 84484 ASSAY OF TROPONIN QUANT: CPT

## 2020-12-28 PROCEDURE — 83605 ASSAY OF LACTIC ACID: CPT

## 2020-12-28 PROCEDURE — 83735 ASSAY OF MAGNESIUM: CPT

## 2020-12-28 PROCEDURE — 70551 MRI BRAIN STEM W/O DYE: CPT

## 2020-12-28 PROCEDURE — 97112 NEUROMUSCULAR REEDUCATION: CPT

## 2020-12-28 PROCEDURE — 93306 TTE W/DOPPLER COMPLETE: CPT

## 2020-12-28 PROCEDURE — 87186 SC STD MICRODIL/AGAR DIL: CPT

## 2020-12-28 PROCEDURE — 81001 URINALYSIS AUTO W/SCOPE: CPT

## 2020-12-28 PROCEDURE — 36415 COLL VENOUS BLD VENIPUNCTURE: CPT

## 2020-12-28 PROCEDURE — 82746 ASSAY OF FOLIC ACID SERUM: CPT

## 2020-12-28 PROCEDURE — 85610 PROTHROMBIN TIME: CPT

## 2020-12-28 PROCEDURE — 84100 ASSAY OF PHOSPHORUS: CPT

## 2020-12-28 PROCEDURE — 87040 BLOOD CULTURE FOR BACTERIA: CPT

## 2020-12-28 PROCEDURE — U0003: CPT

## 2020-12-28 PROCEDURE — 97116 GAIT TRAINING THERAPY: CPT

## 2020-12-28 PROCEDURE — 84145 PROCALCITONIN (PCT): CPT

## 2020-12-28 PROCEDURE — 87086 URINE CULTURE/COLONY COUNT: CPT

## 2020-12-28 PROCEDURE — 85025 COMPLETE CBC W/AUTO DIFF WBC: CPT

## 2020-12-28 PROCEDURE — 99285 EMERGENCY DEPT VISIT HI MDM: CPT

## 2020-12-28 PROCEDURE — 80048 BASIC METABOLIC PNL TOTAL CA: CPT

## 2020-12-28 PROCEDURE — 70544 MR ANGIOGRAPHY HEAD W/O DYE: CPT

## 2020-12-28 PROCEDURE — 82607 VITAMIN B-12: CPT

## 2020-12-28 PROCEDURE — 85730 THROMBOPLASTIN TIME PARTIAL: CPT

## 2020-12-28 PROCEDURE — 97110 THERAPEUTIC EXERCISES: CPT

## 2020-12-28 PROCEDURE — 83036 HEMOGLOBIN GLYCOSYLATED A1C: CPT

## 2020-12-28 PROCEDURE — 80053 COMPREHEN METABOLIC PANEL: CPT

## 2020-12-28 PROCEDURE — 71045 X-RAY EXAM CHEST 1 VIEW: CPT

## 2020-12-28 PROCEDURE — 96374 THER/PROPH/DIAG INJ IV PUSH: CPT

## 2020-12-28 PROCEDURE — 85027 COMPLETE CBC AUTOMATED: CPT

## 2021-01-01 ENCOUNTER — EMERGENCY (EMERGENCY)
Facility: HOSPITAL | Age: 86
LOS: 0 days | Discharge: HOME | End: 2021-11-29
Attending: EMERGENCY MEDICINE
Payer: MEDICARE

## 2021-01-01 ENCOUNTER — EMERGENCY (EMERGENCY)
Facility: HOSPITAL | Age: 86
LOS: 1 days | Discharge: HOME | End: 2021-01-01
Attending: EMERGENCY MEDICINE | Admitting: EMERGENCY MEDICINE
Payer: MEDICARE

## 2021-01-01 VITALS
RESPIRATION RATE: 18 BRPM | HEART RATE: 75 BPM | OXYGEN SATURATION: 98 % | HEIGHT: 55 IN | TEMPERATURE: 98 F | DIASTOLIC BLOOD PRESSURE: 81 MMHG | SYSTOLIC BLOOD PRESSURE: 181 MMHG

## 2021-01-01 VITALS
TEMPERATURE: 98 F | HEART RATE: 94 BPM | DIASTOLIC BLOOD PRESSURE: 96 MMHG | OXYGEN SATURATION: 98 % | SYSTOLIC BLOOD PRESSURE: 150 MMHG | RESPIRATION RATE: 18 BRPM

## 2021-01-01 VITALS
WEIGHT: 145.06 LBS | SYSTOLIC BLOOD PRESSURE: 155 MMHG | DIASTOLIC BLOOD PRESSURE: 82 MMHG | TEMPERATURE: 98 F | HEART RATE: 70 BPM | RESPIRATION RATE: 16 BRPM | HEIGHT: 55 IN | OXYGEN SATURATION: 98 %

## 2021-01-01 VITALS
HEART RATE: 70 BPM | OXYGEN SATURATION: 98 % | RESPIRATION RATE: 16 BRPM | TEMPERATURE: 99 F | DIASTOLIC BLOOD PRESSURE: 65 MMHG | SYSTOLIC BLOOD PRESSURE: 137 MMHG

## 2021-01-01 DIAGNOSIS — Z79.82 LONG TERM (CURRENT) USE OF ASPIRIN: ICD-10-CM

## 2021-01-01 DIAGNOSIS — H02.843 EDEMA OF RIGHT EYE, UNSPECIFIED EYELID: ICD-10-CM

## 2021-01-01 DIAGNOSIS — I10 ESSENTIAL (PRIMARY) HYPERTENSION: ICD-10-CM

## 2021-01-01 DIAGNOSIS — F03.90 UNSPECIFIED DEMENTIA WITHOUT BEHAVIORAL DISTURBANCE: ICD-10-CM

## 2021-01-01 DIAGNOSIS — E78.5 HYPERLIPIDEMIA, UNSPECIFIED: ICD-10-CM

## 2021-01-01 DIAGNOSIS — S00.11XA CONTUSION OF RIGHT EYELID AND PERIOCULAR AREA, INITIAL ENCOUNTER: ICD-10-CM
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
## 2021-01-01 PROCEDURE — 70450 CT HEAD/BRAIN W/O DYE: CPT | Mod: MA

## 2021-01-01 PROCEDURE — 99285 EMERGENCY DEPT VISIT HI MDM: CPT

## 2021-01-01 PROCEDURE — 70486 CT MAXILLOFACIAL W/O DYE: CPT | Mod: 26,MA

## 2021-01-01 PROCEDURE — 76376 3D RENDER W/INTRP POSTPROCES: CPT | Mod: 26

## 2021-01-01 PROCEDURE — 70450 CT HEAD/BRAIN W/O DYE: CPT | Mod: 26,MA

## 2021-01-01 PROCEDURE — 99284 EMERGENCY DEPT VISIT MOD MDM: CPT | Mod: 25

## 2021-01-01 PROCEDURE — 72125 CT NECK SPINE W/O DYE: CPT | Mod: 26,MA

## 2021-01-01 PROCEDURE — U0005: CPT

## 2021-01-01 PROCEDURE — 72125 CT NECK SPINE W/O DYE: CPT | Mod: MA

## 2021-01-01 PROCEDURE — 76376 3D RENDER W/INTRP POSTPROCES: CPT

## 2021-01-01 PROCEDURE — 70486 CT MAXILLOFACIAL W/O DYE: CPT | Mod: MA

## 2021-01-01 PROCEDURE — U0003: CPT

## 2021-01-01 PROCEDURE — 99284 EMERGENCY DEPT VISIT MOD MDM: CPT

## 2021-01-01 RX ORDER — HYDRALAZINE HCL 50 MG
10 TABLET ORAL ONCE
Refills: 0 | Status: COMPLETED | OUTPATIENT
Start: 2021-01-01 | End: 2021-01-01

## 2021-01-01 RX ORDER — AMLODIPINE BESYLATE 2.5 MG/1
5 TABLET ORAL ONCE
Refills: 0 | Status: COMPLETED | OUTPATIENT
Start: 2021-01-01 | End: 2021-01-01

## 2021-01-01 RX ADMIN — Medication 10 MILLIGRAM(S): at 16:28

## 2021-01-01 RX ADMIN — AMLODIPINE BESYLATE 5 MILLIGRAM(S): 2.5 TABLET ORAL at 16:27

## 2021-02-25 NOTE — DIETITIAN INITIAL EVALUATION ADULT. - REASON FOR ADMISSION
Current Issues/Problems reviewed on call:  Working on titrating oxygen. He is doing good majority of the time.     Details for Interventions/Education completed on call:   Patient is doing good. He is rebuilding his strength. He is able to drive himself. His PCP wants him to get a chest xray and labs. He will work on getting a ride from a friend.    Screening completed for  COVID -19 using the [facility] Symptom . Screening is Negative for symptoms    Time Frame for Follow up:  Within within 1-2 weeks    Plan for Next Call:  Follow up on open care gaps.   
ALTERED MENTAL STATUS

## 2021-04-19 NOTE — H&P ADULT - NSHPSOURCEINFOTX_GEN_ALL_CORE
2.3
called emergency contact Wayne [phone] ,was not able to leave a message ( "called party is temporarily not available ")

## 2021-08-03 NOTE — ED PROVIDER NOTE - PATIENT PORTAL LINK FT
You can access the FollowMyHealth Patient Portal offered by [facility] by registering at the following website: [email or web address]. By joining [employer]’s FollowMyHealth portal, you will also be able to view your health information using other applications (apps) compatible with our system.

## 2021-08-03 NOTE — ED ADULT NURSE NOTE - PMH
Anemia    Cognitive deficits    Constipation    Dementia    Depression    HLD (hyperlipidemia)    HTN (hypertension)    TIA (transient ischemic attack)    Weakness

## 2021-08-03 NOTE — ED ADULT NURSE NOTE - NSIMPLEMENTINTERV_GEN_ALL_ED
Implemented All Fall with Harm Risk Interventions:  [place] to call system. Call bell, personal items and telephone within reach. Instruct patient to call for assistance. Room bathroom lighting operational. Non-slip footwear when patient is off stretcher. Physically safe environment: no spills, clutter or unnecessary equipment. Stretcher in lowest position, wheels locked, appropriate side rails in place. Provide visual cue, wrist band, yellow gown, etc. Monitor gait and stability. Monitor for mental status changes and reorient to person, place, and time. Review medications for side effects contributing to fall risk. Reinforce activity limits and safety measures with patient and family. Provide visual clues: red socks.

## 2021-08-03 NOTE — ED ADULT NURSE NOTE - OBJECTIVE STATEMENT
patient awake but confused with Hx of dementia.  patient found by [facility] staff sitting upright on floor this morning.  patient denies any pain but has possible new bump on head. patient states she fell after drinking during a New Year's tad party.

## 2021-08-03 NOTE — ED ADULT NURSE NOTE - HIV OFFER
[No Acute Distress] : no acute distress [Well Nourished] : well nourished [Well Developed] : well developed [Well-Appearing] : well-appearing [Normal Sclera/Conjunctiva] : normal sclera/conjunctiva [PERRL] : pupils equal round and reactive to light [EOMI] : extraocular movements intact [Normal Outer Ear/Nose] : the outer ears and nose were normal in appearance [Normal Oropharynx] : the oropharynx was normal [No JVD] : no jugular venous distention [Supple] : supple [No Lymphadenopathy] : no lymphadenopathy [No Respiratory Distress] : no respiratory distress  [Clear to Auscultation] : lungs were clear to auscultation bilaterally [No Accessory Muscle Use] : no accessory muscle use [Normal Rate] : normal rate  [Regular Rhythm] : with a regular rhythm [Normal S1, S2] : normal S1 and S2 [No Murmur] : no murmur heard [No Varicosities] : no varicosities [Pedal Pulses Present] : the pedal pulses are present [No Edema] : there was no peripheral edema [No Extremity Clubbing/Cyanosis] : no extremity clubbing/cyanosis [Soft] : abdomen soft [Non Tender] : non-tender [Non-distended] : non-distended [No Masses] : no abdominal mass palpated [No HSM] : no HSM [Normal Bowel Sounds] : normal bowel sounds [Normal Posterior Cervical Nodes] : no posterior cervical lymphadenopathy [Normal Anterior Cervical Nodes] : no anterior cervical lymphadenopathy [No CVA Tenderness] : no CVA  tenderness [No Spinal Tenderness] : no spinal tenderness [Grossly Normal Strength/Tone] : grossly normal strength/tone [No Rash] : no rash [Normal Gait] : normal gait [Coordination Grossly Intact] : coordination grossly intact [No Focal Deficits] : no focal deficits [Normal Affect] : the affect was normal [Normal Insight/Judgement] : insight and judgment were intact [de-identified] : right knee with slight swelling and decreased ROM [de-identified] : Multiple skin tags on left side of the neck with erythema around them from treatment yesterday from [facility] Previously Declined (within the last year)

## 2021-08-03 NOTE — ED ADULT NURSE NOTE - SKIN TEMPERATURE MOISTURE
Shoulder Scope RCR follow Up   this is a video visit total time was 10 minutes      Patient: Christy L Verner        YOB: 1971      Chief Complaints: shoulder pain left    History of Present Illness: Pt is here f/u shoulder arthroscopy, RCR  She states her shoulder is doing much better her back went out on her again.  She has seen Dr. Gonzales who recommended epidural injections which she has not done yet she will get back into see him again.  She states her shoulder is doing better but I am concerned she is still limited in her motion.  She is only been to 1 physical therapy visit for variety of reasons including transportation      Allergies: No Known Allergies    Medications:   Home Medications:  Current Outpatient Medications on File Prior to Visit   Medication Sig   • amitriptyline (ELAVIL) 100 MG tablet Take 100 mg by mouth At Night As Needed.   • gabapentin (NEURONTIN) 600 MG tablet Take 600 mg by mouth Every Night.   • methocarbamol (ROBAXIN) 500 MG tablet Take 1 tablet by mouth 4 (Four) Times a Day. (Patient taking differently: Take 500 mg by mouth 3 (Three) Times a Day As Needed.)   • oxyCODONE-acetaminophen (PERCOCET) 7.5-325 MG per tablet Take 1-2 tablets by mouth every 4 hours as needed for severe pain   • traZODone (DESYREL) 100 MG tablet Take 1 tablet by mouth Every Night. (Patient taking differently: Take 50 mg by mouth Every Night.)     No current facility-administered medications on file prior to visit.      Current Medications:  Scheduled Meds:  Continuous Infusions:  No current facility-administered medications for this visit.   PRN Meds:.          Physical Exam: 48 y.o. female  General Appearance:    Alert, cooperative, in no acute distress                 Vitals:    04/24/20 0818   Temp: Comment: MYCHART VIDEO VISIT   Weight: Comment: MYCHART VIDEO VISIT   Height: Comment: MYCHART VIDEO VISIT      Patient is alert and oriented ×3 no acute distress normal mood physical exam.   Physical exam of the shoulder, incisions looked good there is no erythema,no signs or sx of infection.  By distant inspection.  She is still quite limited on range of motion.  Actively she only gets to about 90 passively it looks like she only gets to about 140      Assessment  S/P shoulder scope, rotator cuff repair, a bit concerned about her motion.  I showed her again ways to work on her range of motion I really want her to get back to therapy work on her motion I will see her in the office in 3 weeks         warm/dry

## 2021-08-03 NOTE — ED PROVIDER NOTE - OBJECTIVE STATEMENT
89 y/o F from [place] brought in by ambulance for unwitnessed fall.  pt with baseline dementia with no complaints.

## 2021-11-29 PROBLEM — F03.90 UNSPECIFIED DEMENTIA WITHOUT BEHAVIORAL DISTURBANCE: Chronic | Status: ACTIVE | Noted: 2021-01-01

## 2021-11-29 NOTE — ED PROVIDER NOTE - PATIENT PORTAL LINK FT
You can access the FollowMyHealth Patient Portal offered by [facility] by registering at the following website: [email or web address]. By joining [employer]’s FollowMyHealth portal, you will also be able to view your health information using other applications (apps) compatible with our system.

## 2021-11-29 NOTE — ED PROVIDER NOTE - NSFOLLOWUPINSTRUCTIONS_ED_ALL_ED_FT
Follow-up with your regular physician  Return with any persistent/worsening symptoms.     Head Injury, Adult    There are many types of head injuries. They can be as minor as a bump. Some head injuries can be worse. Worse injuries include:    A strong hit to the head that hurts the brain (concussion).  A bruise of the brain (contusion). This means there is bleeding in the brain that can cause swelling.  A cracked skull (skull fracture).  Bleeding in the brain that gathers, gets thick (makes a clot), and forms a bump (hematoma).    Most problems from a head injury come in the first 24 hours. However, you may still have side effects up to 7–10 days after your injury. It is important to watch your condition for any changes.     Follow these instructions at home:  Activity    Rest as much as possible.  Avoid activities that are hard or tiring.  Make sure you get enough sleep.  Limit activities that need a lot of thought or attention, such as:  Watching TV.  Playing memory games and puzzles.  Job-related work or homework.  Working on the computer, social media, and texting.  Avoid activities that could cause another head injury until your doctor says it is okay. This includes playing sports.  Ask your doctor when it is safe for you to go back to your normal activities, such as work or school. Ask your doctor for a step-by-step plan for slowly going back to your normal activities.  Ask your doctor when you can drive, ride a bicycle, or use heavy machinery. Never do these activities if you are dizzy.    Lifestyle    Do not drink alcohol until your doctor says it is okay.  Avoid drug use.  If it is harder than usual to remember things, write them down.  If you are easily distracted, try to do one thing at a time.  Talk with family members or close friends when making important decisions.  Tell your friends, family, a trusted coworker, and  about your injury, symptoms, and limits (restrictions). Have them watch for any problems that are new or getting worse.    General instructions    Take over-the-counter and prescription medicines only as told by your doctor.  Have someone stay with you for 24 hours after your head injury. This person should watch you for any changes in your symptoms and be ready to get help.  Keep all follow-up visits as told by your doctor. This is important.    How is this prevented?  Work on your balance and strength. This can help you avoid falls.  Wear a seatbelt when you are in a moving vehicle.  Wear a helmet when:  Riding a bicycle.  Skiing.  Doing any other sport or activity that has a risk of injury.  Drink alcohol only in moderation.  Make your home safer by:  Getting rid of clutter from the floors and stairs, like things that can make you trip.  Using grab bars in bathrooms and handrails by stairs.  Placing non-slip mats on floors and in bathtubs.  Putting more light in dim areas.    Get help right away if:  You have:  A very bad (severe) headache that is not helped by medicine.  Trouble walking or weakness in your arms and legs.  Clear or bloody fluid coming from your nose or ears.  Changes in your seeing (vision).  Jerky movements that you cannot control (seizure).  You throw up (vomit).  Your symptoms get worse.  You lose balance.  Your speech is slurred.  You pass out.  You are sleepier and have trouble staying awake.  The black centers of your eyes (pupils) change in size.    These symptoms may be an emergency. Do not wait to see if the symptoms will go away. Get medical help right away. Call your local emergency services (911 in the [place] Do not drive yourself to the hospital.    ADDITIONAL NOTES AND INSTRUCTIONS    Please follow up with your Primary MD in 24-48 hr.  Seek immediate medical care for any new/worsening signs or symptoms.

## 2021-11-29 NOTE — ED PROVIDER NOTE - OBJECTIVE STATEMENT
89 y/o female with a PMHx of anemia, cognitive deficits, constipation, dementia, depression, HTN, HLD, TIA, weakness presents to the ED s/p unwitnessed fall. Pt found on the floor at [facility]. Unknown time of fall. Pt only c/o right periorbital swelling and bruising. Hx limited secondary to dementia.

## 2021-11-29 NOTE — ED ADULT NURSE NOTE - NSICDXPASTMEDICALHX_GEN_ALL_CORE_FT
PAST MEDICAL HISTORY:  Anemia     Cognitive deficits     Constipation     Dementia     Depression     HLD (hyperlipidemia)     HTN (hypertension)     TIA (transient ischemic attack)     Weakness

## 2021-11-29 NOTE — ED ADULT TRIAGE NOTE - CHIEF COMPLAINT QUOTE
Pt comes to ED s/p unwitnessed fall. As per EMS pt woke up with black eye to right side. Unknown LOC, unknown down time. Pt is confused and is at baseline mentation. Pt takes ASA 81mg daily. Trauma alert activated at triage.

## 2021-11-29 NOTE — ED ADULT NURSE REASSESSMENT NOTE - NS ED NURSE REASSESS COMMENT FT1
pt is a&x0, airway patent, does no show s/s of respiratory distress, VS WNL, pt is a poor historian, does not know the year, where she is right now or how she got here. pt is in the hallway to be closely monitored at nurse station for safety precaution, ecchymosis noted to pt right eye,

## 2021-11-29 NOTE — ED ADULT NURSE NOTE - NSIMPLEMENTINTERV_GEN_ALL_ED
Implemented All Fall with Harm Risk Interventions:  [place] to call system. Call bell, personal items and telephone within reach. Instruct patient to call for assistance. Room bathroom lighting operational. Non-slip footwear when patient is off stretcher. Physically safe environment: no spills, clutter or unnecessary equipment. Stretcher in lowest position, wheels locked, appropriate side rails in place. Provide visual cue, wrist band, yellow gown, etc. Monitor gait and stability. Monitor for mental status changes and reorient to person, place, and time. Review medications for side effects contributing to fall risk. Reinforce activity limits and safety measures with patient and family. Provide visual clues: red socks.

## 2021-11-29 NOTE — ED PROVIDER NOTE - NSICDXPASTMEDICALHX_GEN_ALL_CORE_FT
Yes
PAST MEDICAL HISTORY:  Anemia     Cognitive deficits     Constipation     Dementia     Depression     HLD (hyperlipidemia)     HTN (hypertension)     TIA (transient ischemic attack)     Weakness

## 2022-01-01 ENCOUNTER — INPATIENT (INPATIENT)
Facility: HOSPITAL | Age: 87
LOS: 0 days | DRG: 871 | End: 2022-04-20
Attending: INTERNAL MEDICINE | Admitting: INTERNAL MEDICINE
Payer: MEDICARE

## 2022-01-01 VITALS
WEIGHT: 130.07 LBS | TEMPERATURE: 97 F | SYSTOLIC BLOOD PRESSURE: 82 MMHG | OXYGEN SATURATION: 100 % | HEIGHT: 55 IN | RESPIRATION RATE: 18 BRPM | HEART RATE: 123 BPM | DIASTOLIC BLOOD PRESSURE: 33 MMHG

## 2022-01-01 VITALS — OXYGEN SATURATION: 100 % | HEART RATE: 90 BPM | RESPIRATION RATE: 30 BRPM

## 2022-01-01 DIAGNOSIS — R74.01 ELEVATION OF LEVELS OF LIVER TRANSAMINASE LEVELS: ICD-10-CM

## 2022-01-01 DIAGNOSIS — J69.0 PNEUMONITIS DUE TO INHALATION OF FOOD AND VOMIT: ICD-10-CM

## 2022-01-01 DIAGNOSIS — A41.9 SEPSIS, UNSPECIFIED ORGANISM: ICD-10-CM

## 2022-01-01 DIAGNOSIS — Z90.710 ACQUIRED ABSENCE OF BOTH CERVIX AND UTERUS: Chronic | ICD-10-CM

## 2022-01-01 DIAGNOSIS — Z29.9 ENCOUNTER FOR PROPHYLACTIC MEASURES, UNSPECIFIED: ICD-10-CM

## 2022-01-01 DIAGNOSIS — R77.8 OTHER SPECIFIED ABNORMALITIES OF PLASMA PROTEINS: ICD-10-CM

## 2022-01-01 DIAGNOSIS — R57.0 CARDIOGENIC SHOCK: ICD-10-CM

## 2022-01-01 DIAGNOSIS — N17.9 ACUTE KIDNEY FAILURE, UNSPECIFIED: ICD-10-CM

## 2022-01-01 DIAGNOSIS — M62.82 RHABDOMYOLYSIS: ICD-10-CM

## 2022-01-01 DIAGNOSIS — E87.2 ACIDOSIS: ICD-10-CM

## 2022-01-01 DIAGNOSIS — R41.89 OTHER SYMPTOMS AND SIGNS INVOLVING COGNITIVE FUNCTIONS AND AWARENESS: ICD-10-CM

## 2022-01-01 DIAGNOSIS — J96.01 ACUTE RESPIRATORY FAILURE WITH HYPOXIA: ICD-10-CM

## 2022-01-01 LAB
A1C WITH ESTIMATED AVERAGE GLUCOSE RESULT: 6.1 % — HIGH (ref 4–5.6)
ALBUMIN SERPL ELPH-MCNC: 2.1 G/DL — LOW (ref 3.3–5)
ALBUMIN SERPL ELPH-MCNC: 3.1 G/DL — LOW (ref 3.3–5)
ALP SERPL-CCNC: 49 U/L — SIGNIFICANT CHANGE UP (ref 40–120)
ALP SERPL-CCNC: 56 U/L — SIGNIFICANT CHANGE UP (ref 40–120)
ALT FLD-CCNC: 52 U/L — SIGNIFICANT CHANGE UP (ref 12–78)
ALT FLD-CCNC: 5429 U/L — HIGH (ref 12–78)
AMMONIA BLD-MCNC: 29 UMOL/L — SIGNIFICANT CHANGE UP (ref 11–32)
ANION GAP SERPL CALC-SCNC: 17 MMOL/L — SIGNIFICANT CHANGE UP (ref 5–17)
ANION GAP SERPL CALC-SCNC: 24 MMOL/L — HIGH (ref 5–17)
ANION GAP SERPL CALC-SCNC: 26 MMOL/L — HIGH (ref 5–17)
APTT BLD: 27.8 SEC — SIGNIFICANT CHANGE UP (ref 27.5–35.5)
APTT BLD: 48.2 SEC — HIGH (ref 27.5–35.5)
AST SERPL-CCNC: 104 U/L — HIGH (ref 15–37)
AST SERPL-CCNC: SIGNIFICANT CHANGE UP U/L (ref 15–37)
BASE EXCESS BLDA CALC-SCNC: -16 MMOL/L — LOW (ref -2–3)
BASE EXCESS BLDA CALC-SCNC: -18.7 MMOL/L — LOW (ref -2–3)
BASE EXCESS BLDA CALC-SCNC: -20.2 MMOL/L — LOW (ref -2–3)
BASE EXCESS BLDA CALC-SCNC: -20.3 MMOL/L — LOW (ref -2–3)
BASOPHILS # BLD AUTO: 0.07 K/UL — SIGNIFICANT CHANGE UP (ref 0–0.2)
BASOPHILS # BLD AUTO: 0.12 K/UL — SIGNIFICANT CHANGE UP (ref 0–0.2)
BASOPHILS NFR BLD AUTO: 0.3 % — SIGNIFICANT CHANGE UP (ref 0–2)
BASOPHILS NFR BLD AUTO: 0.3 % — SIGNIFICANT CHANGE UP (ref 0–2)
BILIRUB SERPL-MCNC: 1 MG/DL — SIGNIFICANT CHANGE UP (ref 0.2–1.2)
BILIRUB SERPL-MCNC: 1.2 MG/DL — SIGNIFICANT CHANGE UP (ref 0.2–1.2)
BLOOD GAS COMMENTS ARTERIAL: SIGNIFICANT CHANGE UP
BUN SERPL-MCNC: 26 MG/DL — HIGH (ref 7–23)
BUN SERPL-MCNC: 30 MG/DL — HIGH (ref 7–23)
BUN SERPL-MCNC: 33 MG/DL — HIGH (ref 7–23)
CALCIUM SERPL-MCNC: 7 MG/DL — LOW (ref 8.5–10.1)
CALCIUM SERPL-MCNC: 7.1 MG/DL — LOW (ref 8.5–10.1)
CALCIUM SERPL-MCNC: 9.2 MG/DL — SIGNIFICANT CHANGE UP (ref 8.5–10.1)
CHLORIDE SERPL-SCNC: 105 MMOL/L — SIGNIFICANT CHANGE UP (ref 96–108)
CHLORIDE SERPL-SCNC: 107 MMOL/L — SIGNIFICANT CHANGE UP (ref 96–108)
CHLORIDE SERPL-SCNC: 108 MMOL/L — SIGNIFICANT CHANGE UP (ref 96–108)
CK MB BLD-MCNC: 12.9 % — HIGH (ref 0–3.5)
CK MB BLD-MCNC: 13.2 % — HIGH (ref 0–3.5)
CK MB BLD-MCNC: 15.8 % — HIGH (ref 0–3.5)
CK MB CFR SERPL CALC: 130.9 NG/ML — HIGH (ref 0–3.6)
CK MB CFR SERPL CALC: 348.3 NG/ML — HIGH (ref 0–3.6)
CK MB CFR SERPL CALC: 474.3 NG/ML — HIGH (ref 0–3.6)
CK SERPL-CCNC: 2202 U/L — HIGH (ref 26–192)
CK SERPL-CCNC: 364 U/L — HIGH (ref 26–192)
CK SERPL-CCNC: 3688 U/L — HIGH (ref 26–192)
CK SERPL-CCNC: 989 U/L — HIGH (ref 26–192)
CO2 SERPL-SCNC: 10 MMOL/L — CRITICAL LOW (ref 22–31)
CO2 SERPL-SCNC: 16 MMOL/L — LOW (ref 22–31)
CO2 SERPL-SCNC: 6 MMOL/L — CRITICAL LOW (ref 22–31)
CREAT SERPL-MCNC: 2.1 MG/DL — HIGH (ref 0.5–1.3)
CREAT SERPL-MCNC: 2.7 MG/DL — HIGH (ref 0.5–1.3)
CREAT SERPL-MCNC: 3.2 MG/DL — HIGH (ref 0.5–1.3)
EGFR: 13 ML/MIN/1.73M2 — LOW
EGFR: 16 ML/MIN/1.73M2 — LOW
EGFR: 22 ML/MIN/1.73M2 — LOW
EOSINOPHIL # BLD AUTO: 0.01 K/UL — SIGNIFICANT CHANGE UP (ref 0–0.5)
EOSINOPHIL # BLD AUTO: 0.03 K/UL — SIGNIFICANT CHANGE UP (ref 0–0.5)
EOSINOPHIL NFR BLD AUTO: 0 % — SIGNIFICANT CHANGE UP (ref 0–6)
EOSINOPHIL NFR BLD AUTO: 0.1 % — SIGNIFICANT CHANGE UP (ref 0–6)
ESTIMATED AVERAGE GLUCOSE: 128 MG/DL — HIGH (ref 68–114)
FLUAV AG NPH QL: SIGNIFICANT CHANGE UP
FLUBV AG NPH QL: SIGNIFICANT CHANGE UP
GAS PNL BLDA: SIGNIFICANT CHANGE UP
GAS PNL BLDA: SIGNIFICANT CHANGE UP
GLUCOSE SERPL-MCNC: 173 MG/DL — HIGH (ref 70–99)
GLUCOSE SERPL-MCNC: 199 MG/DL — HIGH (ref 70–99)
GLUCOSE SERPL-MCNC: 325 MG/DL — HIGH (ref 70–99)
HCO3 BLDA-SCNC: 10 MMOL/L — CRITICAL LOW (ref 21–28)
HCO3 BLDA-SCNC: 13 MMOL/L — LOW (ref 21–28)
HCO3 BLDA-SCNC: 8 MMOL/L — CRITICAL LOW (ref 21–28)
HCO3 BLDA-SCNC: 9 MMOL/L — CRITICAL LOW (ref 21–28)
HCT VFR BLD CALC: 38.6 % — SIGNIFICANT CHANGE UP (ref 34.5–45)
HCT VFR BLD CALC: 39.1 % — SIGNIFICANT CHANGE UP (ref 34.5–45)
HCT VFR BLD CALC: 43.2 % — SIGNIFICANT CHANGE UP (ref 34.5–45)
HGB BLD-MCNC: 12 G/DL — SIGNIFICANT CHANGE UP (ref 11.5–15.5)
HGB BLD-MCNC: 12 G/DL — SIGNIFICANT CHANGE UP (ref 11.5–15.5)
HGB BLD-MCNC: 14.1 G/DL — SIGNIFICANT CHANGE UP (ref 11.5–15.5)
HOROWITZ INDEX BLDA+IHG-RTO: 100 — SIGNIFICANT CHANGE UP
HOROWITZ INDEX BLDA+IHG-RTO: 40 — SIGNIFICANT CHANGE UP
HOROWITZ INDEX BLDA+IHG-RTO: 50 — SIGNIFICANT CHANGE UP
IMM GRANULOCYTES NFR BLD AUTO: 1.1 % — SIGNIFICANT CHANGE UP (ref 0–1.5)
IMM GRANULOCYTES NFR BLD AUTO: 3.3 % — HIGH (ref 0–1.5)
INR BLD: 1.79 RATIO — HIGH (ref 0.88–1.16)
LACTATE SERPL-SCNC: 12.2 MMOL/L — CRITICAL HIGH (ref 0.7–2)
LACTATE SERPL-SCNC: 12.7 MMOL/L — CRITICAL HIGH (ref 0.7–2)
LACTATE SERPL-SCNC: 12.9 MMOL/L — CRITICAL HIGH (ref 0.7–2)
LACTATE SERPL-SCNC: 14.8 MMOL/L — CRITICAL HIGH (ref 0.7–2)
LACTATE SERPL-SCNC: 9 MMOL/L — CRITICAL HIGH (ref 0.7–2)
LIDOCAIN IGE QN: 57 U/L — LOW (ref 73–393)
LYMPHOCYTES # BLD AUTO: 0.87 K/UL — LOW (ref 1–3.3)
LYMPHOCYTES # BLD AUTO: 1.75 K/UL — SIGNIFICANT CHANGE UP (ref 1–3.3)
LYMPHOCYTES # BLD AUTO: 2.1 % — LOW (ref 13–44)
LYMPHOCYTES # BLD AUTO: 7.2 % — LOW (ref 13–44)
MAGNESIUM SERPL-MCNC: 2.1 MG/DL — SIGNIFICANT CHANGE UP (ref 1.6–2.6)
MAGNESIUM SERPL-MCNC: 2.2 MG/DL — SIGNIFICANT CHANGE UP (ref 1.6–2.6)
MANUAL SMEAR VERIFICATION: SIGNIFICANT CHANGE UP
MCHC RBC-ENTMCNC: 30.5 PG — SIGNIFICANT CHANGE UP (ref 27–34)
MCHC RBC-ENTMCNC: 30.6 PG — SIGNIFICANT CHANGE UP (ref 27–34)
MCHC RBC-ENTMCNC: 30.7 GM/DL — LOW (ref 32–36)
MCHC RBC-ENTMCNC: 30.7 PG — SIGNIFICANT CHANGE UP (ref 27–34)
MCHC RBC-ENTMCNC: 31.1 GM/DL — LOW (ref 32–36)
MCHC RBC-ENTMCNC: 32.6 GM/DL — SIGNIFICANT CHANGE UP (ref 32–36)
MCV RBC AUTO: 93.7 FL — SIGNIFICANT CHANGE UP (ref 80–100)
MCV RBC AUTO: 98.7 FL — SIGNIFICANT CHANGE UP (ref 80–100)
MCV RBC AUTO: 99.5 FL — SIGNIFICANT CHANGE UP (ref 80–100)
MONOCYTES # BLD AUTO: 1.1 K/UL — HIGH (ref 0–0.9)
MONOCYTES # BLD AUTO: 1.63 K/UL — HIGH (ref 0–0.9)
MONOCYTES NFR BLD AUTO: 4 % — SIGNIFICANT CHANGE UP (ref 2–14)
MONOCYTES NFR BLD AUTO: 4.5 % — SIGNIFICANT CHANGE UP (ref 2–14)
MRSA PCR RESULT.: SIGNIFICANT CHANGE UP
NEUTROPHILS # BLD AUTO: 21.19 K/UL — HIGH (ref 1.8–7.4)
NEUTROPHILS # BLD AUTO: 36.8 K/UL — HIGH (ref 1.8–7.4)
NEUTROPHILS NFR BLD AUTO: 86.8 % — HIGH (ref 43–77)
NEUTROPHILS NFR BLD AUTO: 90.3 % — HIGH (ref 43–77)
NRBC # BLD: 0 /100 WBCS — SIGNIFICANT CHANGE UP (ref 0–0)
PCO2 BLDA: 21 MMHG — LOW (ref 32–35)
PCO2 BLDA: 28 MMHG — LOW (ref 32–35)
PCO2 BLDA: 29 MMHG — LOW (ref 32–35)
PCO2 BLDA: 37 MMHG — HIGH (ref 32–35)
PH BLDA: 7.11 — CRITICAL LOW (ref 7.35–7.45)
PH BLDA: 7.15 — CRITICAL LOW (ref 7.35–7.45)
PH BLDA: 7.16 — CRITICAL LOW (ref 7.35–7.45)
PH BLDA: 7.19 — CRITICAL LOW (ref 7.35–7.45)
PHOSPHATE SERPL-MCNC: 7.5 MG/DL — HIGH (ref 2.5–4.5)
PHOSPHATE SERPL-MCNC: 8.5 MG/DL — HIGH (ref 2.5–4.5)
PLAT MORPH BLD: NORMAL — SIGNIFICANT CHANGE UP
PLATELET # BLD AUTO: 126 K/UL — LOW (ref 150–400)
PLATELET # BLD AUTO: 127 K/UL — LOW (ref 150–400)
PLATELET # BLD AUTO: 349 K/UL — SIGNIFICANT CHANGE UP (ref 150–400)
PLATELET CLUMP BLD QL SMEAR: ABNORMAL
PO2 BLDA: 145 MMHG — HIGH (ref 83–108)
PO2 BLDA: 160 MMHG — HIGH (ref 83–108)
PO2 BLDA: 249 MMHG — HIGH (ref 83–108)
PO2 BLDA: 414 MMHG — HIGH (ref 83–108)
POTASSIUM SERPL-MCNC: 4.5 MMOL/L — SIGNIFICANT CHANGE UP (ref 3.5–5.3)
POTASSIUM SERPL-MCNC: 5.1 MMOL/L — SIGNIFICANT CHANGE UP (ref 3.5–5.3)
POTASSIUM SERPL-MCNC: 6.4 MMOL/L — CRITICAL HIGH (ref 3.5–5.3)
POTASSIUM SERPL-SCNC: 4.5 MMOL/L — SIGNIFICANT CHANGE UP (ref 3.5–5.3)
POTASSIUM SERPL-SCNC: 5.1 MMOL/L — SIGNIFICANT CHANGE UP (ref 3.5–5.3)
POTASSIUM SERPL-SCNC: 6.4 MMOL/L — CRITICAL HIGH (ref 3.5–5.3)
PROCALCITONIN SERPL-MCNC: 4.87 NG/ML — HIGH (ref 0–0.04)
PROT SERPL-MCNC: 5.8 G/DL — LOW (ref 6–8.3)
PROT SERPL-MCNC: 8.1 G/DL — SIGNIFICANT CHANGE UP (ref 6–8.3)
PROTHROM AB SERPL-ACNC: 21.1 SEC — HIGH (ref 10.5–13.4)
RAPID RVP RESULT: SIGNIFICANT CHANGE UP
RBC # BLD: 3.91 M/UL — SIGNIFICANT CHANGE UP (ref 3.8–5.2)
RBC # BLD: 3.93 M/UL — SIGNIFICANT CHANGE UP (ref 3.8–5.2)
RBC # BLD: 4.61 M/UL — SIGNIFICANT CHANGE UP (ref 3.8–5.2)
RBC # FLD: 14.2 % — SIGNIFICANT CHANGE UP (ref 10.3–14.5)
RBC # FLD: 14.3 % — SIGNIFICANT CHANGE UP (ref 10.3–14.5)
RBC # FLD: 14.6 % — HIGH (ref 10.3–14.5)
RBC BLD AUTO: NORMAL — SIGNIFICANT CHANGE UP
RSV RNA NPH QL NAA+NON-PROBE: SIGNIFICANT CHANGE UP
S AUREUS DNA NOSE QL NAA+PROBE: DETECTED
SAO2 % BLDA: 100 % — HIGH (ref 94–98)
SAO2 % BLDA: 100 % — HIGH (ref 94–98)
SAO2 % BLDA: 99 % — HIGH (ref 94–98)
SAO2 % BLDA: 99.3 % — HIGH (ref 94–98)
SARS-COV-2 RNA SPEC QL NAA+PROBE: SIGNIFICANT CHANGE UP
SARS-COV-2 RNA SPEC QL NAA+PROBE: SIGNIFICANT CHANGE UP
SODIUM SERPL-SCNC: 138 MMOL/L — SIGNIFICANT CHANGE UP (ref 135–145)
SODIUM SERPL-SCNC: 140 MMOL/L — SIGNIFICANT CHANGE UP (ref 135–145)
SODIUM SERPL-SCNC: 141 MMOL/L — SIGNIFICANT CHANGE UP (ref 135–145)
TROPONIN I, HIGH SENSITIVITY RESULT: 4521.3 NG/L — HIGH
TROPONIN I, HIGH SENSITIVITY RESULT: HIGH NG/L
TSH SERPL-MCNC: 5.3 UIU/ML — HIGH (ref 0.36–3.74)
WBC # BLD: 24.42 K/UL — HIGH (ref 3.8–10.5)
WBC # BLD: 40.65 K/UL — CRITICAL HIGH (ref 3.8–10.5)
WBC # BLD: 40.77 K/UL — CRITICAL HIGH (ref 3.8–10.5)
WBC # FLD AUTO: 24.42 K/UL — HIGH (ref 3.8–10.5)
WBC # FLD AUTO: 40.65 K/UL — CRITICAL HIGH (ref 3.8–10.5)
WBC # FLD AUTO: 40.77 K/UL — CRITICAL HIGH (ref 3.8–10.5)

## 2022-01-01 PROCEDURE — 71250 CT THORAX DX C-: CPT | Mod: 26,MA

## 2022-01-01 PROCEDURE — 93306 TTE W/DOPPLER COMPLETE: CPT | Mod: 26

## 2022-01-01 PROCEDURE — 71045 X-RAY EXAM CHEST 1 VIEW: CPT | Mod: 26,77

## 2022-01-01 PROCEDURE — 74176 CT ABD & PELVIS W/O CONTRAST: CPT | Mod: 26,MA

## 2022-01-01 PROCEDURE — 99291 CRITICAL CARE FIRST HOUR: CPT | Mod: FT,25

## 2022-01-01 PROCEDURE — 99291 CRITICAL CARE FIRST HOUR: CPT

## 2022-01-01 PROCEDURE — 71045 X-RAY EXAM CHEST 1 VIEW: CPT | Mod: 26

## 2022-01-01 PROCEDURE — 31500 INSERT EMERGENCY AIRWAY: CPT

## 2022-01-01 PROCEDURE — 93010 ELECTROCARDIOGRAM REPORT: CPT

## 2022-01-01 PROCEDURE — 70450 CT HEAD/BRAIN W/O DYE: CPT | Mod: 26,MA

## 2022-01-01 RX ORDER — HYDROCORTISONE 20 MG
50 TABLET ORAL EVERY 6 HOURS
Refills: 0 | Status: DISCONTINUED | OUTPATIENT
Start: 2022-01-01 | End: 2022-01-01

## 2022-01-01 RX ORDER — SIMVASTATIN 20 MG/1
40 TABLET, FILM COATED ORAL AT BEDTIME
Refills: 0 | Status: DISCONTINUED | OUTPATIENT
Start: 2022-01-01 | End: 2022-01-01

## 2022-01-01 RX ORDER — HEPARIN SODIUM 5000 [USP'U]/ML
5000 INJECTION INTRAVENOUS; SUBCUTANEOUS EVERY 8 HOURS
Refills: 0 | Status: DISCONTINUED | OUTPATIENT
Start: 2022-01-01 | End: 2022-01-01

## 2022-01-01 RX ORDER — SODIUM BICARBONATE 1 MEQ/ML
0.13 SYRINGE (ML) INTRAVENOUS
Qty: 50 | Refills: 0 | Status: DISCONTINUED | OUTPATIENT
Start: 2022-01-01 | End: 2022-01-01

## 2022-01-01 RX ORDER — FENTANYL CITRATE 50 UG/ML
100 INJECTION INTRAVENOUS ONCE
Refills: 0 | Status: DISCONTINUED | OUTPATIENT
Start: 2022-01-01 | End: 2022-01-01

## 2022-01-01 RX ORDER — SODIUM CHLORIDE 9 MG/ML
1000 INJECTION, SOLUTION INTRAVENOUS
Refills: 0 | Status: DISCONTINUED | OUTPATIENT
Start: 2022-01-01 | End: 2022-01-01

## 2022-01-01 RX ORDER — PANTOPRAZOLE SODIUM 20 MG/1
40 TABLET, DELAYED RELEASE ORAL DAILY
Refills: 0 | Status: DISCONTINUED | OUTPATIENT
Start: 2022-01-01 | End: 2022-01-01

## 2022-01-01 RX ORDER — NOREPINEPHRINE BITARTRATE/D5W 8 MG/250ML
0.05 PLASTIC BAG, INJECTION (ML) INTRAVENOUS
Qty: 8 | Refills: 0 | Status: DISCONTINUED | OUTPATIENT
Start: 2022-01-01 | End: 2022-01-01

## 2022-01-01 RX ORDER — SODIUM CHLORIDE 9 MG/ML
1000 INJECTION INTRAMUSCULAR; INTRAVENOUS; SUBCUTANEOUS ONCE
Refills: 0 | Status: COMPLETED | OUTPATIENT
Start: 2022-01-01 | End: 2022-01-01

## 2022-01-01 RX ORDER — SENNA PLUS 8.6 MG/1
2 TABLET ORAL
Qty: 0 | Refills: 0 | DISCHARGE

## 2022-01-01 RX ORDER — SODIUM CHLORIDE 9 MG/ML
10 INJECTION INTRAMUSCULAR; INTRAVENOUS; SUBCUTANEOUS
Refills: 0 | Status: DISCONTINUED | OUTPATIENT
Start: 2022-01-01 | End: 2022-01-01

## 2022-01-01 RX ORDER — SODIUM CHLORIDE 9 MG/ML
1850 INJECTION INTRAMUSCULAR; INTRAVENOUS; SUBCUTANEOUS ONCE
Refills: 0 | Status: COMPLETED | OUTPATIENT
Start: 2022-01-01 | End: 2022-01-01

## 2022-01-01 RX ORDER — MEROPENEM 1 G/30ML
500 INJECTION INTRAVENOUS EVERY 12 HOURS
Refills: 0 | Status: DISCONTINUED | OUTPATIENT
Start: 2022-01-01 | End: 2022-01-01

## 2022-01-01 RX ORDER — PIPERACILLIN AND TAZOBACTAM 4; .5 G/20ML; G/20ML
3.38 INJECTION, POWDER, LYOPHILIZED, FOR SOLUTION INTRAVENOUS ONCE
Refills: 0 | Status: COMPLETED | OUTPATIENT
Start: 2022-01-01 | End: 2022-01-01

## 2022-01-01 RX ORDER — FENTANYL CITRATE 50 UG/ML
25 INJECTION INTRAVENOUS EVERY 4 HOURS
Refills: 0 | Status: DISCONTINUED | OUTPATIENT
Start: 2022-01-01 | End: 2022-01-01

## 2022-01-01 RX ORDER — DEXMEDETOMIDINE HYDROCHLORIDE IN 0.9% SODIUM CHLORIDE 4 UG/ML
0.3 INJECTION INTRAVENOUS
Qty: 200 | Refills: 0 | Status: DISCONTINUED | OUTPATIENT
Start: 2022-01-01 | End: 2022-01-01

## 2022-01-01 RX ORDER — ASPIRIN/CALCIUM CARB/MAGNESIUM 324 MG
81 TABLET ORAL DAILY
Refills: 0 | Status: DISCONTINUED | OUTPATIENT
Start: 2022-01-01 | End: 2022-01-01

## 2022-01-01 RX ORDER — FENTANYL CITRATE 50 UG/ML
50 INJECTION INTRAVENOUS
Refills: 0 | Status: DISCONTINUED | OUTPATIENT
Start: 2022-01-01 | End: 2022-01-01

## 2022-01-01 RX ORDER — DEXTROSE 50 % IN WATER 50 %
25 SYRINGE (ML) INTRAVENOUS ONCE
Refills: 0 | Status: DISCONTINUED | OUTPATIENT
Start: 2022-01-01 | End: 2022-01-01

## 2022-01-01 RX ORDER — MEROPENEM 1 G/30ML
500 INJECTION INTRAVENOUS ONCE
Refills: 0 | Status: COMPLETED | OUTPATIENT
Start: 2022-01-01 | End: 2022-01-01

## 2022-01-01 RX ORDER — INSULIN LISPRO 100/ML
VIAL (ML) SUBCUTANEOUS EVERY 6 HOURS
Refills: 0 | Status: DISCONTINUED | OUTPATIENT
Start: 2022-01-01 | End: 2022-01-01

## 2022-01-01 RX ORDER — GLUCAGON INJECTION, SOLUTION 0.5 MG/.1ML
1 INJECTION, SOLUTION SUBCUTANEOUS ONCE
Refills: 0 | Status: DISCONTINUED | OUTPATIENT
Start: 2022-01-01 | End: 2022-01-01

## 2022-01-01 RX ORDER — DOBUTAMINE HCL 250MG/20ML
10 VIAL (ML) INTRAVENOUS
Qty: 500 | Refills: 0 | Status: DISCONTINUED | OUTPATIENT
Start: 2022-01-01 | End: 2022-01-01

## 2022-01-01 RX ORDER — MIDAZOLAM HYDROCHLORIDE 1 MG/ML
2 INJECTION, SOLUTION INTRAMUSCULAR; INTRAVENOUS ONCE
Refills: 0 | Status: DISCONTINUED | OUTPATIENT
Start: 2022-01-01 | End: 2022-01-01

## 2022-01-01 RX ORDER — PIPERACILLIN AND TAZOBACTAM 4; .5 G/20ML; G/20ML
3.38 INJECTION, POWDER, LYOPHILIZED, FOR SOLUTION INTRAVENOUS EVERY 12 HOURS
Refills: 0 | Status: DISCONTINUED | OUTPATIENT
Start: 2022-01-01 | End: 2022-01-01

## 2022-01-01 RX ORDER — HEPARIN SODIUM 5000 [USP'U]/ML
3800 INJECTION INTRAVENOUS; SUBCUTANEOUS EVERY 6 HOURS
Refills: 0 | Status: DISCONTINUED | OUTPATIENT
Start: 2022-01-01 | End: 2022-01-01

## 2022-01-01 RX ORDER — CHLORHEXIDINE GLUCONATE 213 G/1000ML
1 SOLUTION TOPICAL
Refills: 0 | Status: DISCONTINUED | OUTPATIENT
Start: 2022-01-01 | End: 2022-01-01

## 2022-01-01 RX ORDER — MEROPENEM 1 G/30ML
INJECTION INTRAVENOUS
Refills: 0 | Status: DISCONTINUED | OUTPATIENT
Start: 2022-01-01 | End: 2022-01-01

## 2022-01-01 RX ORDER — SUCCINYLCHOLINE CHLORIDE 100 MG/5ML
80 SYRINGE (ML) INTRAVENOUS ONCE
Refills: 0 | Status: COMPLETED | OUTPATIENT
Start: 2022-01-01 | End: 2022-01-01

## 2022-01-01 RX ORDER — OMEPRAZOLE 10 MG/1
1 CAPSULE, DELAYED RELEASE ORAL
Qty: 0 | Refills: 0 | DISCHARGE

## 2022-01-01 RX ORDER — CHLORHEXIDINE GLUCONATE 213 G/1000ML
15 SOLUTION TOPICAL EVERY 12 HOURS
Refills: 0 | Status: DISCONTINUED | OUTPATIENT
Start: 2022-01-01 | End: 2022-01-01

## 2022-01-01 RX ORDER — MIDAZOLAM HYDROCHLORIDE 1 MG/ML
2 INJECTION, SOLUTION INTRAMUSCULAR; INTRAVENOUS
Refills: 0 | Status: DISCONTINUED | OUTPATIENT
Start: 2022-01-01 | End: 2022-01-01

## 2022-01-01 RX ORDER — AMLODIPINE BESYLATE 2.5 MG/1
7.5 TABLET ORAL
Qty: 0 | Refills: 0 | DISCHARGE

## 2022-01-01 RX ORDER — SODIUM BICARBONATE 1 MEQ/ML
44 SYRINGE (ML) INTRAVENOUS ONCE
Refills: 0 | Status: COMPLETED | OUTPATIENT
Start: 2022-01-01 | End: 2022-01-01

## 2022-01-01 RX ORDER — HEPARIN SODIUM 5000 [USP'U]/ML
3800 INJECTION INTRAVENOUS; SUBCUTANEOUS ONCE
Refills: 0 | Status: COMPLETED | OUTPATIENT
Start: 2022-01-01 | End: 2022-01-01

## 2022-01-01 RX ORDER — DEXTROSE 50 % IN WATER 50 %
12.5 SYRINGE (ML) INTRAVENOUS ONCE
Refills: 0 | Status: DISCONTINUED | OUTPATIENT
Start: 2022-01-01 | End: 2022-01-01

## 2022-01-01 RX ORDER — ASPIRIN/CALCIUM CARB/MAGNESIUM 324 MG
1 TABLET ORAL
Qty: 0 | Refills: 0 | DISCHARGE

## 2022-01-01 RX ORDER — CHOLECALCIFEROL (VITAMIN D3) 125 MCG
1 CAPSULE ORAL
Qty: 0 | Refills: 0 | DISCHARGE

## 2022-01-01 RX ORDER — HEPARIN SODIUM 5000 [USP'U]/ML
INJECTION INTRAVENOUS; SUBCUTANEOUS
Qty: 25000 | Refills: 0 | Status: DISCONTINUED | OUTPATIENT
Start: 2022-01-01 | End: 2022-01-01

## 2022-01-01 RX ORDER — ETOMIDATE 2 MG/ML
20 INJECTION INTRAVENOUS ONCE
Refills: 0 | Status: COMPLETED | OUTPATIENT
Start: 2022-01-01 | End: 2022-01-01

## 2022-01-01 RX ORDER — CEFTRIAXONE 500 MG/1
1000 INJECTION, POWDER, FOR SOLUTION INTRAMUSCULAR; INTRAVENOUS ONCE
Refills: 0 | Status: DISCONTINUED | OUTPATIENT
Start: 2022-01-01 | End: 2022-01-01

## 2022-01-01 RX ORDER — DEXTROSE 50 % IN WATER 50 %
15 SYRINGE (ML) INTRAVENOUS ONCE
Refills: 0 | Status: DISCONTINUED | OUTPATIENT
Start: 2022-01-01 | End: 2022-01-01

## 2022-01-01 RX ORDER — VASOPRESSIN 20 [USP'U]/ML
0.04 INJECTION INTRAVENOUS
Qty: 50 | Refills: 0 | Status: DISCONTINUED | OUTPATIENT
Start: 2022-01-01 | End: 2022-01-01

## 2022-01-01 RX ORDER — PROPOFOL 10 MG/ML
10 INJECTION, EMULSION INTRAVENOUS
Qty: 500 | Refills: 0 | Status: DISCONTINUED | OUTPATIENT
Start: 2022-01-01 | End: 2022-01-01

## 2022-01-01 RX ORDER — VANCOMYCIN HCL 1 G
750 VIAL (EA) INTRAVENOUS ONCE
Refills: 0 | Status: COMPLETED | OUTPATIENT
Start: 2022-01-01 | End: 2022-01-01

## 2022-01-01 RX ADMIN — DEXMEDETOMIDINE HYDROCHLORIDE IN 0.9% SODIUM CHLORIDE 4.43 MICROGRAM(S)/KG/HR: 4 INJECTION INTRAVENOUS at 21:39

## 2022-01-01 RX ADMIN — Medication 5.53 MICROGRAM(S)/KG/MIN: at 00:32

## 2022-01-01 RX ADMIN — Medication 50 MILLIGRAM(S): at 19:13

## 2022-01-01 RX ADMIN — MEROPENEM 100 MILLIGRAM(S): 1 INJECTION INTRAVENOUS at 19:58

## 2022-01-01 RX ADMIN — PIPERACILLIN AND TAZOBACTAM 200 GRAM(S): 4; .5 INJECTION, POWDER, LYOPHILIZED, FOR SOLUTION INTRAVENOUS at 11:07

## 2022-01-01 RX ADMIN — DEXMEDETOMIDINE HYDROCHLORIDE IN 0.9% SODIUM CHLORIDE 4.43 MICROGRAM(S)/KG/HR: 4 INJECTION INTRAVENOUS at 03:21

## 2022-01-01 RX ADMIN — Medication 150 MEQ/KG/HR: at 18:00

## 2022-01-01 RX ADMIN — FENTANYL CITRATE 25 MICROGRAM(S): 50 INJECTION INTRAVENOUS at 19:25

## 2022-01-01 RX ADMIN — Medication 81 MILLIGRAM(S): at 19:13

## 2022-01-01 RX ADMIN — Medication 80 MILLIGRAM(S): at 09:06

## 2022-01-01 RX ADMIN — FENTANYL CITRATE 50 MICROGRAM(S): 50 INJECTION INTRAVENOUS at 02:27

## 2022-01-01 RX ADMIN — Medication 4: at 23:25

## 2022-01-01 RX ADMIN — SODIUM CHLORIDE 1000 MILLILITER(S): 9 INJECTION INTRAMUSCULAR; INTRAVENOUS; SUBCUTANEOUS at 14:55

## 2022-01-01 RX ADMIN — MIDAZOLAM HYDROCHLORIDE 2 MILLIGRAM(S): 1 INJECTION, SOLUTION INTRAMUSCULAR; INTRAVENOUS at 01:26

## 2022-01-01 RX ADMIN — PANTOPRAZOLE SODIUM 40 MILLIGRAM(S): 20 TABLET, DELAYED RELEASE ORAL at 12:59

## 2022-01-01 RX ADMIN — DEXMEDETOMIDINE HYDROCHLORIDE IN 0.9% SODIUM CHLORIDE 4.43 MICROGRAM(S)/KG/HR: 4 INJECTION INTRAVENOUS at 14:55

## 2022-01-01 RX ADMIN — SODIUM CHLORIDE 1850 MILLILITER(S): 9 INJECTION INTRAMUSCULAR; INTRAVENOUS; SUBCUTANEOUS at 09:18

## 2022-01-01 RX ADMIN — HEPARIN SODIUM 3800 UNIT(S): 5000 INJECTION INTRAVENOUS; SUBCUTANEOUS at 20:15

## 2022-01-01 RX ADMIN — FENTANYL CITRATE 100 MICROGRAM(S): 50 INJECTION INTRAVENOUS at 11:00

## 2022-01-01 RX ADMIN — Medication 5.53 MICROGRAM(S)/KG/MIN: at 09:25

## 2022-01-01 RX ADMIN — FENTANYL CITRATE 25 MICROGRAM(S): 50 INJECTION INTRAVENOUS at 00:31

## 2022-01-01 RX ADMIN — Medication 18.6 MICROGRAM(S)/KG/MIN: at 18:02

## 2022-01-01 RX ADMIN — DEXMEDETOMIDINE HYDROCHLORIDE IN 0.9% SODIUM CHLORIDE 4.43 MICROGRAM(S)/KG/HR: 4 INJECTION INTRAVENOUS at 09:28

## 2022-01-01 RX ADMIN — CHLORHEXIDINE GLUCONATE 15 MILLILITER(S): 213 SOLUTION TOPICAL at 18:04

## 2022-01-01 RX ADMIN — Medication 250 MILLIGRAM(S): at 13:51

## 2022-01-01 RX ADMIN — ETOMIDATE 20 MILLIGRAM(S): 2 INJECTION INTRAVENOUS at 09:06

## 2022-01-01 RX ADMIN — VASOPRESSIN 2.4 UNIT(S)/MIN: 20 INJECTION INTRAVENOUS at 19:58

## 2022-01-01 RX ADMIN — Medication 5.53 MICROGRAM(S)/KG/MIN: at 03:21

## 2022-01-01 RX ADMIN — HEPARIN SODIUM 750 UNIT(S)/HR: 5000 INJECTION INTRAVENOUS; SUBCUTANEOUS at 20:12

## 2022-01-01 RX ADMIN — Medication 100 MEQ/KG/HR: at 14:02

## 2022-01-01 RX ADMIN — Medication 50 MILLIGRAM(S): at 23:22

## 2022-01-01 RX ADMIN — PIPERACILLIN AND TAZOBACTAM 25 GRAM(S): 4; .5 INJECTION, POWDER, LYOPHILIZED, FOR SOLUTION INTRAVENOUS at 18:02

## 2022-01-01 RX ADMIN — Medication 44 MILLIEQUIVALENT(S): at 11:13

## 2022-01-01 RX ADMIN — CHLORHEXIDINE GLUCONATE 1 APPLICATION(S): 213 SOLUTION TOPICAL at 18:04

## 2022-01-01 RX ADMIN — HEPARIN SODIUM 5000 UNIT(S): 5000 INJECTION INTRAVENOUS; SUBCUTANEOUS at 15:01

## 2022-01-01 RX ADMIN — Medication 5.53 MICROGRAM(S)/KG/MIN: at 14:55

## 2022-01-01 RX ADMIN — SIMVASTATIN 40 MILLIGRAM(S): 20 TABLET, FILM COATED ORAL at 21:39

## 2022-01-01 RX ADMIN — Medication 5.53 MICROGRAM(S)/KG/MIN: at 20:15

## 2022-04-19 NOTE — ED PROVIDER NOTE - GASTROINTESTINAL, MLM
abd soft but pt makes a grimace on face upon palpation of the abdomen indicating there is pain , rectal pt has brown stool

## 2022-04-19 NOTE — CONSULT NOTE ADULT - ASSESSMENT
RABKIN TASH 4/[birth year]/2022 1/7/1930 DR CARA RÍOS     Initial evaluation/Pulmonary Critical Care consultation requested on  4/19/2022 by Dr CAMPBELL  from Dr Kaur   Patient examined chart reviewed    HOSPITAL ADMISSION   PATIENT CAME  FROM (if information available)      ZACH BIANCHI 4/[birth year]2022 1/7/1930 DR CARA RÍOS     REVIEW OF SYMPTOMS      Able to give (reliable) ROS  NO     PHYSICAL EXAM    HEENT Unremarkable  atraumatic   RESP Fair air entry EXP prolonged    Harsh breath sound Resp distres mild   CARDIAC S1 S2 No S3     NO JVD    ABDOMEN SOFT BS PRESENT NOT DISTENDED No hepatosplenomegaly   PEDAL EDEMA present No calf tenderness  NO rash     PATIENT PRESENTATION.  4/19/2022  89 yo female with a pmh of dementia, HTN, HLD, TIA, GERD and anemia who presented to [facility] ED on 04/19/22 from [facility] facility where they were found unresponsive. On EMS arrival, patient was unresponsive and hypotensive. In ED patient intubated for acute hypoxic resp failure, resp distress and airway protection. Further work up revealed WBC 24.42, K 5.1, creat 2.1, lactate 9, , trop 4521, ABG post intubation 7.15/37/249/13. CT C/A/P showing Bilateral small pleural effusions and interstitial edema, Increased density right breast with architectural distortion and calcification, Nonobstructive left nephrolithiasis and Nonspecific mesenteric panniculitis. CXR showing b/l infiltrates R>L. In ED patient given 1850 IVF per sepsis protocol and zosyn. Patient started on bicarb infusion d/t acidosis. Patient with continued hypotension, started on levophed. ICU consulted for further management and care.  Upon assessment patient is intubated, sedated and unresponsive.  Patient RSR in 90's, hypotensive with SBP 80.  Patient being admitted to ICU.  Pulm consulted 4/19/2022                                                                  DOA/CC/PROBLEMS poa .  89 f  doa 4/19/2022  cc Unresponsive resp failure meta acidosis sepsis asp pneum r breast density      PMH-PSH .  pmh Dementia  pmh  HLD  pmh  TIA  pmh GERD  pmh Anemia   pmh       GENERAL ISSUES .                                       ALLERGY.   nka                         WEIGHT.      4/19/2022 62                               BMI.                4/19/2022 25           COVID/ICU/CODE STATUS.                       COVID  STATUS.   4/19/2022 scv2 (-)         ICU STAY. none  GOC.  4/19/2022 dnr    BEST PRACTICE ISSUES.                                                  HEAD OF BED ELEVATION. Yes  DVT PROPHYLAXIS.      ELISE PROPHYLAXIS.    4/19/2022 protonix 40                                                                                     DIET.   4/19/2022 npo              VITALS/PO/IO/VENT/DRIPS.     4/19/2022 81 110/70   4/19/2022 5p dexm .5 m/k/h   4/19/2022 5p nore .4 m/k/m  4/19/2022 ac 24/370/5/.4      PROBLEM DATA/ASSESSMENT RECOMMENDATIONS (A/R).    HEMODYNAMICS.   Monitor bp Target MAP 65 (+)    RESP.   Monitor po Target po 90-95%    ABG.  4/19/2022 2p ac 24/370/5/.5 716/28/160       PMH/PSH PROBLEMS.  Management continued/modified as indicated    VENT MANAGEMENT.   HOB elevation  Target Pplat 30 (-)  Target PO 90-95%  Target pH 730 (+)  Daily spontaneous breathing trials   Daily sedation vacation         INFECTION SOURCE.  aspn pneum poa 4/19/2022    INFECTION A/R.   On empiric meropenem started 4/19/2022     INFECTION AMES.  W 4/19/2022 w 24   ct 4/19/2022 ct cap bl sm effs chf Increased density r breast Nonsp mesenteric panniculits  cxr 4/19/2022 bl infiltr   MICROBIO.  flu ab 4/19/2022 (-)   rsv 4/19/2022 (-)   ABIO.  4/19/2022 meropenem     LACTICEMIA.  La 4/19/2022 la 9     SEPTIC SHOCK.  4/19/2022 hydrocortisone 50.4  4/19/2022 norepi 8 in 250   4/19/2022 vasopressin 50 in 50 .04 u/m     SEDATION   4/19/2022 Dexmedetomidine 200 in 50 target rass 0 to -1   4/19/2022 fent 25.6p     META ACIDOSIS   4/19/2022   CO2. 16  AG. 17  ALB. 3.1  4/19/2022 Na HCO3 50 in 1000 at 150/h       MI.  Tr 4/19/2022 Tr 4521   ck 4/19/2022 ck 364   4/19/2022 asa 81     CHF.  4/19/2022 dobutamine 500 in 250 10 m/k/m     LORENZO.  Cr 4/19/2022 Cr 2     OVERALL PLAN  ASP PNEUM   SEPTIC SHOCK  CARDIAC SHOCK   MI   LORENZO  META ACIDOSIS         TIME SPENT   Over 55 minutes aggregate care time spent on encounter; activities included   direct patient care, counseling and/or coordinating care reviewing notes, lab data/ imaging , discussion with multidisciplinary team/ patient  /family and explaining in detail risks, benefits, alternatives  of the recommendations     ZACH BIANCHI 4/19/2022 1/7/1930 DR CARA RÍOS

## 2022-04-19 NOTE — H&P ADULT - NSHPLABSRESULTS_GEN_ALL_CORE
< from: CT Chest No Cont (04.19.22 @ 10:02) >    ACC: [id number] EXAM:  CT ABDOMEN AND PELVIS                        ACC: [id number] EXAM:  CT CHEST                          PROCEDURE DATE:  04/19/2022          INTERPRETATION:  CLINICAL INFORMATION: Respiratory failure, abdominal pain    COMPARISON: None.    CONTRAST/COMPLICATIONS:  IV Contrast: NONE  Oral Contrast: NONE  Complications: None reported at time of study completion    PROCEDURE:  CT of the Chest, Abdomen and Pelvis was performed.  Sagittal and coronal reformats were performed.    FINDINGS:  CHEST:  LUNGS AND LARGE AIRWAYS: Patent central airways. ET tube in situ.   Bilateral layering pleural effusions right larger than left with   bibasilar dependent atelectasis. Prominent subpleural interlobular septa   suggests interstitial edema..  PLEURA: No pleural effusion.  VESSELS: Nonaneurysmal  HEART: Heart size is normal, with coronary artery calcification. No   pericardial effusion.  MEDIASTINUM AND THEO: No lymphadenopathy.  CHEST WALL AND LOWER NECK: Increased density right breast with   architectural distortion and benign calcification. Neoplastic process not   excluded. Correlate with breast imaging..    ABDOMEN AND PELVIS:  LIVER: Within normal limits.  BILE DUCTS: Normal caliber.  GALLBLADDER: Within normal limits.  SPLEEN: Within normal limits.  PANCREAS: Within normal limits.  ADRENALS: Within normal limits.  KIDNEYS/URETERS: Cortical atrophy and scarring. Extensive vascular   calcification. Punctate nonobstructive left renal calculus..    BLADDER: Not adequately distended  REPRODUCTIVE ORGANS: Hysterectomy    BOWEL: No bowel obstruction. Colonic diverticula. No diverticulitis. NG   tube in position. Appendix no appendicitis  PERITONEUM: Hazy density in the mid to lower abdominal mesentery with   subcentimeter lymph nodes (2, 92/93) consistent with nonspecific   mesenteric panniculitis..  VESSELS: Nonaneurysmal.  RETROPERITONEUM/LYMPH NODES: No lymphadenopathy.  ABDOMINAL WALL: Within normal limits.  BONES: Degenerative changes.    IMPRESSION:  Bilateral small pleural effusions and interstitial edema. Consider an   element of mild/early CHF or fluid overload. No focal consolidation.    Increased density right breast with architectural distortion and   calcification. Correlate with dedicated breast imaging.    Nonobstructive left nephrolithiasis.    Nonspecific mesenteric panniculitis.    Colonic diverticulosis without acute diverticulitis.    < end of copied text >    < from: CT Head No Cont (04.19.22 @ 10:01) >        < end of copied text >

## 2022-04-19 NOTE — ED ADULT TRIAGE NOTE - CHIEF COMPLAINT QUOTE
PT BIB EMS for eval of altered mental status from [facility]. Per EMS pt found to be lethargic on awakening this morning. Pt also hypotensive on EMS arrival to 60s systolic.

## 2022-04-19 NOTE — ED PROVIDER NOTE - PRIOR EKG STATUS
Increase Topiramate 150 mg twice daily (50 mg tabs)    See Taty Fowler, Health Psychology. Call [facility] to schedule [phone]   there is no prior EKG available for comparison

## 2022-04-19 NOTE — PROVIDER CONTACT NOTE (CRITICAL VALUE NOTIFICATION) - DATE AND TIME:
19-Apr-2022 10:30 [x] [facility]  [address]  [phone]  F: [phone]     Physical Therapy Daily Treatment Note    Date:  3/23/2021  Patient Name:  Rio Steele      :  1970    MRN: [id number]  Physician: Sudarshan Mckenzie                           Insurance: [employer] [place] (by medical necessity)  Medical Diagnosis: Lumbar back pain with radiculopathy affecting left lower extremity M54.16             Rehab Codes: M54.5, M79.652, R26.2, R29.3  Onset Date: 2020                    Next 's appt.: 2021     Visit# / total visits:   Cancels/No Shows: 2/3    Subjective:    Pain:  [x] Yes  [] No Location: LB, L hip   Pain Rating: (0-10 scale) 2/10  Pain altered Tx:  [x] No  [] Yes  Action:  Comments: patient notes back is feeling much better today, continued relief since last Tx.         Objective:  Modalities: HP (large) EStim (IFC opiate) LB prone, at end of Rx held   Precautions:  Exercises completed marked with \"X\" 21  Exercise Reps/ Time Weight/ Level Comments    SciFit 6 min Level 3  X   Door stretch  3x30\"             Standing   Added 3/11/21    Door Pec    X   Calf Stretch on Wedge 3x30\"   X   Calf raises  20x   X   3-way hip  15x ea   X   Marching 20x ea   X   HS Curls  20x ea   X   Squats 2x10  Added 3/23/21 X          Seated        LAQ 20x 2 lbs W/abdom bouchra,     Hamstring stretch 3x30\" ea   X   Piriformis Stretch 3x30\" ea   X          Supine       SKTC 5x5\"   X   LTR 10x5\"  Alternating  X   Alt UE raises  10x      March  10x 2 lb     SLR 15x ea      SAQ 10x ea 2 lb     Bridge 15x  Added 3/18/21    Sciatic nn glides 15x3\"  Added 3/18/21 X                 Sidelying       Hip abduction  10x ea             Other: manual stretching of L LE/posterior hip       Treatment Charges: Mins Units   [x]  Modalities ES  HP     [x]  Ther Exercise 45 3   [x]  Manual Therapy 5 0   []  Ther Activities     []  Aquatics     [] Vasocompression     []  Other     Total Treatment time 50 3       Assessment: [x] Progressing toward goals: continued focus on standing ex's and mobility ex's in supine to improve posterior chain soft tissue restrictions and improve nn mobility on sciatic nn. Good tolerance overall throughout Tx with LE/core ex's and notes less stiffness post Tx. With some mild fatigue. [] No change. [] Other:  [x] Patient would continue to benefit from skilled physical therapy services in order to: decrease pain, increase hip and knee strength, increase lumbar ROM, and increase tolerance to standing and walking. STG: (to be met in 10 treatments)  1. ? Pain: L LB, LE 5/10 average pain, 7/10 at worst  2. ? ROM: Lumbar ext 20°  3. ? Strength: Hip flex L 3+/5, R 4-/5, B hip ext 4-/5, B knees 4+/5  4. ? Function: Oswestry 52% loss of function  5. Patient to be independent with home exercise program as demonstrated by performance with correct form without cues.     LTG: (to be met in 18 treatments)  1. ? Pain: L LB, LE 2/10 average pain, 5/10 at worst  2. ? ROM: Lumbar AROM WNL without increased pain   3. ? Strength: B hips 4/5  4. ? Function: Oswestry 38% loss of function  5. Pt able to walk long enough to do grocery shopping without increased pain       Pt. Education:  [] Yes  [] No  [] Reviewed Prior HEP/Ed  Method of Education: [] Verbal  [] Demo  [] Written  Comprehension of Education:  [x] Verbalizes understanding  [] Demonstrates understanding. [] Needs review. [] Demonstrates/verbalizes HEP/Ed previously given. 2/17 Correct technique shoveling snow, importance of making legs do the work, avoiding bending and twisting back. 3/8: Scapular retractions, posterior shoulder rolls, Supine marching, SLR, SAQ    Plan: [x] Continue current frequency toward long and short term goals.     [x] Specific Instructions for subsequent treatments:  progress spinal stab ex per Pt tolerance, monitor response to ES, progress standing ex        Time In: 0902            Time Out:  1003       Electronically signed by:  Marissa Viera, PTA

## 2022-04-19 NOTE — CONSULT NOTE ADULT - ASSESSMENT
Patient is a 89 yo female with a pmh of dementia, HTN, HLD, TIA, GERD and anemia who is being admitted to presented to the ED unresponsive. Patient is being admitted to ICU.    Problem:  - Septic shock  - Acute hypoxic resp failure  - B/L PNA (?asp)  - AMS   - acidosis   - LORENZO (baseline creat 0.9-1.1)  - Transaminitis  - Elevate troponin   - ? Rhabdomyolysis    Plan:  Neuro: Patient found with AMS/unresponsive. CTH showing no acute findings. Sedation ordered as intubated. Will c/w Precedex for sedation. Can add PRN fentanyl if needed for comfort/vent synchrony. Titrate to maintain RASS 0 to -1.   Respiratory: Intubated in ED, currently on 14/450/5/60. Titrate fio2 to maintain spo2 >90%. Will decrease TV To 370 for lung protective measures based on IBW, increase rate to 18 for compensation. Repeat ABG x1 hour post changes.   Cardiac: Currently in shock with hypotension Actively titrating vasopressors to maintain MAP >65 for adequate tissue perfusion. RSR on monitor. Trop elevated, likely demand ischemia from hypotension, will continue to trend. Repeat EKG in am.   GI: OGT placed post intubation. Place to low IS d/t distention. NPO except meds. GI ppx with PPI. AST elevated, continue to monitor and trend. CT A/P with no acute findings or etiology for clinical presentation.   /Renal: Metabolic acidosis on ABG, place on bicarb gtt in ED. Will continue at this time. Repeat BMP in evening. UA pending. Can not r/o rhabdo given found unresponsive for unknown time, elevated AST. Stat CK. Creat elevated from baseline, LORENZO likely from ATN given hypotension/sepsis. Received 1850 IVF in ED. Avoid nephrotoxic agents. Trend CMP, replace electrolytes as needed.   ID: PNA on CXR, concern from aspiration. UA pending, can not r/o urosepsis. Lactate elevated at 9. Repeat post IVF. Given zosyn in ED, will continue empiric coverage with zosyn, will give x1 dose vanco as from [facility] facility. Cultures obtained, continue to trend, adjust abx as cultures and sensitivity result. Trend WBC, pro komal and lactate. Assess for fevers  Hem/Onc: No active issues, will place on DVT ppx with sub q heparin q8 hr given LORENZO. Trend CBC, transfuse if hgb <7.  Endo: No active issues. Goal blood glucose 100-180.  Dispo: ICU   Patient is a 89 yo female with a pmh of dementia, HTN, HLD, TIA, GERD and anemia who is being admitted to presented to the ED unresponsive. Patient is being admitted to ICU.    Problem:  - Septic shock  - Acute hypoxic resp failure  - B/L PNA (?asp)  - AMS   - acidosis   - LORENZO (baseline creat 0.9-1.1)  - Transaminitis  - Elevate troponin   - ? Rhabdomyolysis    Plan:  Neuro: Patient found with AMS/unresponsive. CTH showing no acute findings. Sedation ordered as intubated. Will c/w Precedex for sedation. Can add PRN fentanyl if needed for comfort/vent synchrony. Titrate to maintain RASS 0 to -1.   Respiratory: Intubated in ED, currently on 14/450/5/60. Titrate fio2 to maintain spo2 >90%. Will decrease TV To 370 for lung protective measures based on IBW, increase rate to 18 for compensation. Repeat ABG x1 hour post changes.   Cardiac: Currently in shock with hypotension Actively titrating vasopressors to maintain MAP >65 for adequate tissue perfusion. RSR on monitor. Trop elevated, likely demand ischemia from hypotension, will continue to trend. Repeat EKG in am. Will obtain TTE, last known EF in 2020 was 55%. Mild CHF noted on CXR.  GI: OGT placed post intubation. Place to low IS d/t distention. NPO except meds. GI ppx with PPI. AST elevated, continue to monitor and trend. CT A/P with no acute findings or etiology for clinical presentation.   /Renal: Metabolic acidosis on ABG, place on bicarb gtt in ED. Will continue at this time. Repeat BMP in evening. UA pending. Can not r/o rhabdo given found unresponsive for unknown time, elevated AST. Stat CK. Creat elevated from baseline, LORENZO likely from ATN given hypotension/sepsis. Received 1850 IVF in ED. Avoid nephrotoxic agents. Trend CMP, replace electrolytes as needed.   ID: PNA on CXR, concern from aspiration. UA pending, can not r/o urosepsis. Lactate elevated at 9. Repeat post IVF. Given zosyn in ED, will continue empiric coverage with zosyn, will give x1 dose vanco as from [facility] facility. Cultures obtained, continue to trend, adjust abx as cultures and sensitivity result. Trend WBC, pro komal and lactate. Assess for fevers  Hem/Onc: No active issues, will place on DVT ppx with sub q heparin q8 hr given LORENZO. Trend CBC, transfuse if hgb <7.  Endo: No active issues. Goal blood glucose 100-180.  Dispo: Full code, ICU    Per chart from facility, patient is DNR - though no MOLST noted with patient. Call placed to emergency contact/son Wayne at , no answer, messaged left. Attempted secondary number  (no answer, unable to leave voicemail). Will f/u.  Patient is a 89 yo female with a pmh of dementia, HTN, HLD, TIA, GERD and anemia who is being admitted to presented to the ED unresponsive. Patient is being admitted to ICU.    Problem:  - Shock (sepsis vs cardiogenic)  - Acute hypoxic resp failure  - B/L PNA (?asp)  - AMS   - acidosis   - LORENZO (baseline creat 0.9-1.1)  - Transaminitis  - Elevate troponin   - ? Rhabdomyolysis    Plan:  Neuro: Patient found with AMS/unresponsive. CTH showing no acute findings. Sedation ordered as intubated. Will c/w Precedex for sedation. Can add PRN fentanyl if needed for comfort/vent synchrony. Titrate to maintain RASS 0 to -1.   Respiratory: Intubated in ED, currently on 14/450/5/60. Titrate fio2 to maintain spo2 >90%. Will decrease TV To 370 for lung protective measures based on IBW, increase rate to 18 for compensation. Repeat ABG x1 hour post changes.   Cardiac: Currently in shock with hypotension Actively titrating vasopressors to maintain MAP >65 for adequate tissue perfusion. RSR on monitor. Trop elevated, likely demand ischemia from hypotension, will continue to trend. Repeat EKG in am. Will obtain TTE, last known EF in 2020 was 55%. Mild CHF noted on CXR.  GI: OGT placed post intubation. Place to low IS d/t distention. NPO except meds. GI ppx with PPI. AST elevated, continue to monitor and trend. CT A/P with no acute findings or etiology for clinical presentation.   /Renal: Metabolic acidosis on ABG, place on bicarb gtt in ED. Will continue at this time. Repeat BMP in evening. UA pending. Can not r/o rhabdo given found unresponsive for unknown time, elevated AST. Stat CK. Creat elevated from baseline, LORENZO likely from ATN given hypotension/sepsis. Received 1850 IVF in ED. Avoid nephrotoxic agents. Trend CMP, replace electrolytes as needed.   ID: PNA on CXR, concern from aspiration. UA pending, can not r/o urosepsis. Lactate elevated at 9. Repeat post IVF. Given zosyn in ED, will continue empiric coverage with zosyn, will give x1 dose vanco as from [facility] facility. Cultures obtained, continue to trend, adjust abx as cultures and sensitivity result. Trend WBC, pro komal and lactate. Assess for fevers  Hem/Onc: No active issues, will place on DVT ppx with sub q heparin q8 hr given LORENZO. Trend CBC, transfuse if hgb <7.  Endo: No active issues. Goal blood glucose 100-180.  Dispo: Full code, ICU    Per chart from facility, patient is DNR - though no MOLST noted with patient. Call placed to emergency contact/son Wayne at , no answer, messaged left. Attempted secondary number  (no answer, unable to leave voicemail). Will f/u.     UPDATE: Spoke to son Wayne who is primary contact in chart. Wayne states he is not primary medical decision and brother James in [place] is and does not know if patient is DNR. Wayne states he will contact brother and call back.  I personally placed call to James Ellison at . No answer, voicemail left with instructions to call back.    - Trop  Patient is a 89 yo female with a pmh of dementia, HTN, HLD, TIA, GERD and anemia who is being admitted to presented to the ED unresponsive. Patient is being admitted to ICU.    Problem:  - Shock (sepsis vs cardiogenic)  - Acute hypoxic resp failure  - B/L PNA (?asp)  - AMS   - metabolic acidosis   - LORENZO (baseline creat 0.9-1.1)  - Transaminitis  - Elevate troponin   - ? Rhabdomyolysis    Plan:  Neuro: Patient found with AMS/unresponsive. CTH showing no acute findings. Sedation ordered as intubated. Will c/w Precedex for sedation. Can add PRN fentanyl if needed for comfort/vent synchrony. Titrate to maintain RASS 0 to -1.   Respiratory: Intubated in ED, currently on 14/450/5/60. Titrate fio2 to maintain spo2 >90%. Will decrease TV To 370 for lung protective measures based on IBW, increase rate to 18 for compensation. Repeat ABG x1 hour post changes.   Cardiac: Currently in shock with hypotension Actively titrating vasopressors to maintain MAP >65 for adequate tissue perfusion. RSR on monitor. Trop elevated, likely demand ischemia from hypotension, will continue to trend. Repeat EKG in am. Will obtain TTE, last known EF in 2020 was 55%. Mild CHF noted on CXR.  GI: OGT placed post intubation. Place to low IS d/t distention. NPO except meds. GI ppx with PPI. AST elevated, continue to monitor and trend. CT A/P with no acute findings or etiology for clinical presentation.   /Renal: Metabolic acidosis on ABG, place on bicarb gtt in ED. Will continue at this time. Repeat BMP in evening. UA pending. Can not r/o rhabdo given found unresponsive for unknown time, elevated AST. Stat CK. Creat elevated from baseline, LORENZO likely from ATN given hypotension/sepsis. Received 1850 IVF in ED. Avoid nephrotoxic agents. Trend CMP, replace electrolytes as needed.   ID: PNA on CXR, concern from aspiration. UA pending, can not r/o urosepsis. Lactate elevated at 9. Repeat post IVF. Given zosyn in ED, will continue empiric coverage with zosyn, will give x1 dose vanco as from [facility] facility. Cultures obtained, continue to trend, adjust abx as cultures and sensitivity result. Trend WBC, pro komal and lactate. Assess for fevers  Hem/Onc: No active issues, will place on DVT ppx with sub q heparin q8 hr given LORENZO. Trend CBC, transfuse if hgb <7.  Endo: No active issues. Goal blood glucose 100-180.  Dispo: Full code, ICU    Per chart from facility, patient is DNR - though no MOLST noted with patient. Call placed to emergency contact/son Wayne at , no answer, messaged left. Attempted secondary number  (no answer, unable to leave voicemail). Will f/u.     UPDATE: Spoke to son Wayne who is primary contact in chart. Wayne states he is not primary medical decision and brother James in [place] is and does not know if patient is DNR. Wayne states he will contact brother and call back.  I personally placed call to James Ellison at . No answer, voicemail left with instructions to call back.    - Trop with continued elevation, cardiology following. Pending TTE at this time. Continue to trend for peak. EKG showing RBBB, no ST elevation.   - CK elevated, rhabdo can not be ruled out, will give x1 L IVF  - Patient with no urine output, bladder scan showing 3 cc. Zapata flushed with 30cc of sterile NS, appropriate flow. Monitor output post IVF  - Low threshold to add vasopressin as Levophed requirements increasing  - Lactate trending up, IVF as above, repeat x1 hour post fluid  - ABG showing non improved acidosis. Will increase d5w to 150, increase TV and increase RR    UPDATE/GOC: Spoke with estrada Ramirez who is primary decision maker. Updated on mother's condition. States mother is DNR and would not want CPR if cardiac arrest were to occur. Does want to continue aggressive medical care at this time. All questions answered. MOSLT form updated.    Additional critical care time: 33 minutes.   Total critical care time: 89 minutes assessing presenting problems of acute illness, which pose high probability of life threatening deterioration or end organ damage/dysfunction, as well as medical decision making including initiating plan of care, reviewing data, reviewing radiologic exams, discussing with multidisciplinary team,  discussing goals of care with patient/family, and writing this note.  Non-inclusive of procedures performed.  Patient is a 89 yo female with a pmh of dementia, HTN, HLD, TIA, GERD and anemia who is being admitted to presented to the ED unresponsive. Patient is being admitted to ICU.    Problem:  - Shock (sepsis vs cardiogenic)  - Acute hypoxic resp failure  - B/L PNA (?asp)  - AMS   - metabolic acidosis   - LORENZO (baseline creat 0.9-1.1)  - Transaminitis  - Elevate troponin   - ? Rhabdomyolysis    Plan:  Neuro: Patient found with AMS/unresponsive. CTH showing no acute findings. Sedation ordered as intubated. Will c/w Precedex for sedation. Can add PRN fentanyl if needed for comfort/vent synchrony. Titrate to maintain RASS 0 to -1.   Respiratory: Intubated in ED, currently on 14/450/5/60. Titrate fio2 to maintain spo2 >90%. Will decrease TV To 370 for lung protective measures based on IBW, increase rate to 18 for compensation. Repeat ABG x1 hour post changes.   Cardiac: Currently in shock with hypotension Actively titrating vasopressors to maintain MAP >65 for adequate tissue perfusion. RSR on monitor. Trop elevated, likely demand ischemia from hypotension, will continue to trend. Repeat EKG in am. Will obtain TTE, last known EF in 2020 was 55%. Mild CHF noted on CXR.  GI: OGT placed post intubation. Place to low IS d/t distention. NPO except meds. GI ppx with PPI. AST elevated, continue to monitor and trend. CT A/P with no acute findings or etiology for clinical presentation.   /Renal: Metabolic acidosis on ABG, place on bicarb gtt in ED. Will continue at this time. Repeat BMP in evening. UA pending. Can not r/o rhabdo given found unresponsive for unknown time, elevated AST. Stat CK. Creat elevated from baseline, LORENZO likely from ATN given hypotension/sepsis. Received 1850 IVF in ED. Avoid nephrotoxic agents. Trend CMP, replace electrolytes as needed.   ID: PNA on CXR, concern from aspiration. UA pending, can not r/o urosepsis. Lactate elevated at 9. Repeat post IVF. Given zosyn in ED, will continue empiric coverage with zosyn, will give x1 dose vanco as from [facility]. Cultures obtained, continue to trend, adjust abx as cultures and sensitivity result. Trend WBC, pro komal and lactate. Assess for fevers  Hem/Onc: No active issues, will place on DVT ppx with sub q heparin q8 hr given LORENZO. Trend CBC, transfuse if hgb <7.  Endo: No active issues. Goal blood glucose 100-180.  Dispo: Full code, ICU    Per chart from facility, patient is DNR - though no MOLST noted with patient. Call placed to emergency contact/son Wayne at , no answer, messaged left. Attempted secondary number  (no answer, unable to leave voicemail). Will f/u.     UPDATE: Spoke to son Wayne who is primary contact in chart. Wayne states he is not primary medical decision and brother James in [place] is and does not know if patient is DNR. Wayne states he will contact brother and call back.  I personally placed call to James Ellison at . No answer, voicemail left with instructions to call back.    - Trop with continued elevation, cardiology following. Pending TTE at this time. Continue to trend for peak. EKG showing RBBB, no ST elevation.   - CK elevated, rhabdo can not be ruled out, will give x1 L IVF  - Patient with no urine output, bladder scan showing 3 cc. Zapata flushed with 30cc of sterile NS, appropriate flow. Monitor output post IVF  - Low threshold to add vasopressin as Levophed requirements increasing  - Lactate trending up, IVF as above, repeat x1 hour post fluid  - ABG showing non improved acidosis. Will increase d5w to 150, increase TV and increase RR  - POCUS showing reduced LV function, no visible pericardial effusion. VTI measured at 4.6.  - Cardiogenic shock likely, will start dobutamine, titrate to increase VTI.    UPDATE/GOC: Spoke with son James who is primary decision maker. Updated on mother's condition. States mother is DNR and would not want CPR if cardiac arrest were to occur. Does want to continue aggressive medical care at this time. All questions answered. MOSLT form updated.    Additional critical care time: 33 minutes.   Total critical care time: 89 minutes assessing presenting problems of acute illness, which pose high probability of life threatening deterioration or end organ damage/dysfunction, as well as medical decision making including initiating plan of care, reviewing data, reviewing radiologic exams, discussing with multidisciplinary team,  discussing goals of care with patient/family, and writing this note.  Non-inclusive of procedures performed.

## 2022-04-19 NOTE — CONSULT NOTE ADULT - SUBJECTIVE AND OBJECTIVE BOX
Patient is a 89y old  Female who presents with a chief complaint of acute hypoxic resp failure    BRIEF HOSPITAL COURSE: Patient is a 89 yo female with a pmh of dementia, HTN, HLD, TIA, GERD and anemia who presented to [facility] ED on 04/19/22 from [facility] facility where they were found unresponsive. On EMS arrival, patient was unresponsive and hypotensive. In ED patient intubated for acute hypoxic resp failure, resp distress and airway protection. Further work up revealed WBC 24.42, K 5.1, creat 2.1, lactate 9, , trop 4521, ABG post intubation 7.15/37/249/13. CT C/A/P showing Bilateral small pleural effusions and interstitial edema, Increased density right breast with architectural distortion and calcification, Nonobstructive left nephrolithiasis and Nonspecific mesenteric panniculitis. CXR showing b/l infiltrates R>L. In ED patient given 1850 IVF per sepsis protocol and zosyn. Patient started on bicarb infusion d/t acidosis. Patient with continued hypotension, started on levophed. ICU consulted for further management and care.  Upon assessment patient is intubated, sedated and unresponsive.  Patient RSR in 90's, hypotensive with SBP 80.  Patient being admitted to ICU.    Events last 24 hours: Arrived from facility unresponsive, intubated in ED. Found to be in septic shock requiring vasopressors. Intubated. Being admitted to ICU.     PAST MEDICAL & SURGICAL HISTORY:  TIA (transient ischemic attack)  Anemia  Weakness  Cognitive deficits  HTN (hypertension)  Depression  HLD (hyperlipidemia)  Constipation  Dementia    Review of Systems:  SHEEBA as patient intubated and sedated, unresponsive.      Medications:  piperacillin/tazobactam IVPB... 3.375 Gram(s) IV Intermittent once  norepinephrine Infusion 0.05 MICROgram(s)/kG/Min IV Continuous <Continuous>  dexMEDEtomidine Infusion 0.3 MICROgram(s)/kG/Hr IV Continuous <Continuous>  fentaNYL    Injectable 100 MICROGram(s) IV Push Once  sodium bicarbonate  Infusion 0.085 mEq/kG/Hr IV Continuous <Continuous>  sodium bicarbonate  Injectable 44 milliEquivalent(s) IV Push Once  chlorhexidine 0.12% Liquid 15 milliLiter(s) Oral Mucosa every 12 hours  chlorhexidine 2% Cloths 1 Application(s) Topical <User Schedule>    Mode: AC/ CMV (Assist Control/ Continuous Mandatory Ventilation)  RR (machine): 14  TV (machine): 450  FiO2: 60  PEEP: 5  ITime: 1  MAP: 9  PIP: 26    ICU Vital Signs Last 24 Hrs  T(C): 36.3 (19 Apr 2022 08:40), Max: 36.3 (19 Apr 2022 08:40)  T(F): 97.3 (19 Apr 2022 08:40), Max: 97.3 (19 Apr 2022 08:40)  HR: 112 (19 Apr 2022 10:12) (108 - 123)  BP: 112/68 (19 Apr 2022 10:26) (77/37 - 130/92)  BP(mean): 86 (19 Apr 2022 10:26) (86 - 91)  ABP: --  ABP(mean): --  RR: 15 (19 Apr 2022 10:12) (15 - 18)  SpO2: 100% (19 Apr 2022 10:12) (100% - 100%)      ABG - ( 19 Apr 2022 10:22 )  pH, Arterial: 7.15  pH, Blood: x     /  pCO2: 37    /  pO2: 249   / HCO3: 13    / Base Excess: -16.0 /  SaO2: 100.0     I&O's Detail      LABS:                        14.1   24.42 )-----------( 349      ( 19 Apr 2022 09:37 )             43.2     04-19    138  |  105  |  26<H>  ----------------------------<  199<H>  5.1   |  16<L>  |  2.10<H>    Ca    9.2      19 Apr 2022 09:37    TPro  8.1  /  Alb  3.1<L>  /  TBili  1.2  /  DBili  x   /  AST  104<H>  /  ALT  52  /  AlkPhos  56  04-19    CAPILLARY BLOOD GLUCOSE    POCT Blood Glucose.: 201 mg/dL (19 Apr 2022 08:51)    CULTURES:      Physical Examination:    General: Intubated and sedated. Unresponsive.     HEENT: Pupils equal 2 mm, sluggishly reactive to light.  Symmetric.    PULM: Diminished breath sounds to b/l lower bases. Chest expansion symmetrical Minimal secretions via inline suction. Intubated via ETT.    CVS: Regular rate and rhythm, no murmurs, rubs, or gallops. Hypotensive.    ABD: Soft, Mild distention, hypooactive bowel sounds    EXT: No edema    SKIN: Warm and well perfused, no visible abrasions    NEURO: Sedated, unresponsive, recently received paralytic for intubation; unable to accurately assess      RADIOLOGY: < from: CT Chest No Cont (04.19.22 @ 10:02) >  FINDINGS:  CHEST:  LUNGS AND LARGE AIRWAYS: Patent central airways. ET tube in situ.   Bilateral layering pleural effusions right larger than left with   bibasilar dependent atelectasis. Prominent subpleural interlobular septa   suggests interstitial edema..  PLEURA: No pleural effusion.  VESSELS: Nonaneurysmal  HEART: Heart size is normal, with coronary artery calcification. No   pericardial effusion.  MEDIASTINUM AND THEO: No lymphadenopathy.  CHEST WALL AND LOWER NECK: Increased density right breast with   architectural distortion and benign calcification. Neoplastic process not   excluded. Correlate with breast imaging..    ABDOMEN AND PELVIS:  LIVER: Within normal limits.  BILE DUCTS: Normal caliber.  GALLBLADDER: Within normal limits.  SPLEEN: Within normal limits.  PANCREAS: Within normal limits.  ADRENALS: Within normal limits.  KIDNEYS/URETERS: Cortical atrophy and scarring. Extensive vascular   calcification. Punctate nonobstructive left renal calculus..    BLADDER: Not adequately distended  REPRODUCTIVE ORGANS: Hysterectomy    BOWEL: No bowel obstruction. Colonic diverticula. No diverticulitis. NG   tube in position. Appendix no appendicitis  PERITONEUM: Hazy density in the mid to lower abdominal mesentery with   subcentimeter lymph nodes (2, 92/93) consistent with nonspecific   mesenteric panniculitis..  VESSELS: Nonaneurysmal.  RETROPERITONEUM/LYMPH NODES: No lymphadenopathy.  ABDOMINAL WALL: Within normal limits.  BONES: Degenerative changes.    IMPRESSION:  Bilateral small pleural effusions and interstitial edema. Consider an   element of mild/early CHF or fluid overload. No focal consolidation.    Increased density right breast with architectural distortion and   calcification. Correlate with dedicated breast imaging.    Nonobstructive left nephrolithiasis.    Nonspecific mesenteric panniculitis.    Colonic diverticulosis without acute diverticulitis.    < from: CT Head No Cont (04.19.22 @ 10:01) >  INTERPRETATION:  Clinical Indication: Altered mental status, dementia    5mm axial sections of the brain were obtained from base to vertex,   without the intravenous administration of contrast material. Coronal and   sagittal computer generated reconstructed views are available.    Comparison is made with prior CT of 11/29/2021 and demonstrates no   significant change.    The ventricles and sulci are prominent consistent age appropriate   involutional changes. Small vessel white matter ischemic changes are   noted. There is no hemorrhage, mass, or shift of midline structures. Bone   window examination is unremarkable. There has been prior bilateral lens   replacement surgery. A high left frontal parietal scalp with   calcification likely represents a sebaceous cyst. Good case yesterday   night    IMPRESSION: Age-appropriate involutional and ischemic gliotic changes. No   hemorrhage. No change from 11/29/2021.    CRITICAL CARE TIME SPENT: 56 minutes  Evaluating/treating patient, reviewing data/labs/imaging, discussing case with multidisciplinary team, discussing plan/goals of care with patient/family. Non-inclusive of procedure time.

## 2022-04-19 NOTE — ED PROVIDER NOTE - PROGRESS NOTE DETAILS
pt emergently intubated using rsi icu consulted aware of my prelim on ct that revealsbl pneumonia worse on r side pt overbreathing vent found on abg to be profoudly acidotic with llikely metabolic  pos trops,  fentanly given biccarb drip started ct revieweed pt admitted to icu dr boo cardiology paged will see pt in icu

## 2022-04-19 NOTE — ED PROVIDER NOTE - CRITICAL CARE ATTENDING CONTRIBUTION TO CARE
hx per ems and transfer papers:  pt is a 89 yo f whose pmd is dr Washington has hx of dementia tia, hld htn gerd and was found on am rounds of her dementia care facility to be unresponsive. ems was called pt found to be hypotensive and apparently dehydrated  no reports of trauma or injury no other data available.  exam reveals pt in resp distress hypoxia hypotensive on eval  needs emergency intubation for airway protection   ct scan to ro surgical or infectious path of abd chest ct head ro ich  labs ro metabolic physiologic derangement  abg ro acidosis hypercarbia  blood cultures type and screen   ua garcia cath ro uti uro sepsis  presumptive abx zosyn   pressors levophed, precedex for sedation, iv fluid resuscitation vent will manage with abg and pulse ox   pt critically ill hx per ems and transfer papers:  pt is a 89 yo f whose pmd is dr Washington has hx of dementia tia, hld htn gerd and was found on am rounds of her dementia care facility to be unresponsive. ems was called pt found to be hypotensive and apparently dehydrated  no reports of trauma or injury no other data available.  exam reveals pt in resp distress hypoxia hypotensive on eval  needs emergency intubation for airway protection   ct scan to ro surgical or infectious path of abd chest ct head ro ich  labs ro metabolic physiologic derangement  abg ro acidosis hypercarbia  blood cultures type and screen   ua garcia cath ro uti uro sepsis  presumptive abx zosyn   pressors levophed, precedex for sedation, iv fluid resuscitation vent will manage with abg and pulse ox   pt critically ill  ekg ro arrhythmia acs

## 2022-04-19 NOTE — PROVIDER CONTACT NOTE (CRITICAL VALUE NOTIFICATION) - PERSON GIVING RESULT:
Cherelle Carrillo
willy
willy
Lab Madiha Sheldon
Prakash Kam, RRT
Prakash Kam, RRT
Marilee Leyva
Ramila Nelson
Shiraz Bliss, RRT, respiratory care

## 2022-04-19 NOTE — PROVIDER CONTACT NOTE (CRITICAL VALUE NOTIFICATION) - ACTION/TREATMENT ORDERED:
RR increased to 30 and VT increased to 450
continue ventilatory support
No new orders given
ongoing
no changes at this time.

## 2022-04-19 NOTE — CONSULT NOTE ADULT - SUBJECTIVE AND OBJECTIVE BOX
Patient is a 89y old  Female who presents with a chief complaint of unresponsiveness (19 Apr 2022 15:53)      HPI:   Patient is a 89 yo Female ,[facility] resident with a pmh of dementia, HTN, HLD, TIA, GERD and anemia who presented to [facility] ED on 04/19/22 from [facility] facility where they were found unresponsive. On EMS arrival, patient was unresponsive and hypotensive. In ED patient intubated for acute hypoxic resp failure, resp distress and airway protection. Further work up revealed WBC 24.42, K 5.1, creat 2.1, lactate 9, , trop 4521, ABG post intubation 7.15/37/249/13. CT C/A/P showing Bilateral small pleural effusions and interstitial edema, Increased density right breast with architectural distortion and calcification, Nonobstructive left nephrolithiasis and Nonspecific mesenteric panniculitis. CXR showing b/l infiltrates R>L. In ED patient given 1850 IVF per sepsis protocol and zosyn. Patient started on bicarb infusion d/t acidosis. Patient with continued hypotension, started on levophed. ICU consulted for further management and care.Upon assessment patient is intubated, sedated and unresponsive.Patient RSR in 90's, hypotensive with SBP 80.Patient being admitted to ICU.Events last 24 hours: Arrived from facility unresponsive, intubated in ED. Found to be in septic shock requiring vasopressors. Intubated. She was admitted to ICU. Zapata was placed with no urine output.         PAST MEDICAL & SURGICAL HISTORY:  TIA (transient ischemic attack)    Anemia    Weakness    Cognitive deficits    HTN (hypertension)    Depression    HLD (hyperlipidemia)    Constipation    Dementia    GERD (gastroesophageal reflux disease)    HLD (hyperlipidemia)    Nephrolithiasis    Chronic diastolic congestive heart failure    History of hysterectomy        Allergies    No Known Allergies    Intolerances        REVIEW OF SYSTEMS:  Not able to obtain.    HOME MEDICATIONS:    MEDICATIONS  (STANDING):  aspirin  chewable 81 milliGRAM(s) Oral daily  chlorhexidine 0.12% Liquid 15 milliLiter(s) Oral Mucosa every 12 hours  chlorhexidine 2% Cloths 1 Application(s) Topical <User Schedule>  dexMEDEtomidine Infusion 0.3 MICROgram(s)/kG/Hr (4.43 mL/Hr) IV Continuous <Continuous>  DOBUTamine Infusion 10 MICROgram(s)/kG/Min (18.6 mL/Hr) IV Continuous <Continuous>  hydrocortisone sodium succinate Injectable 50 milliGRAM(s) IV Push every 6 hours  norepinephrine Infusion 0.05 MICROgram(s)/kG/Min (5.53 mL/Hr) IV Continuous <Continuous>  pantoprazole  Injectable 40 milliGRAM(s) IV Push daily  piperacillin/tazobactam IVPB.. 3.375 Gram(s) IV Intermittent every 12 hours  simvastatin 40 milliGRAM(s) Oral at bedtime  sodium bicarbonate  Infusion 0.127 mEq/kG/Hr (150 mL/Hr) IV Continuous <Continuous>  vasopressin Infusion 0.04 Unit(s)/Min (2.4 mL/Hr) IV Continuous <Continuous>    MEDICATIONS  (PRN):  fentaNYL    Injectable 25 MICROGram(s) IV Push every 4 hours PRN Agitation  sodium chloride 0.9% lock flush 10 milliLiter(s) IV Push every 1 hour PRN Pre/post blood products, medications, blood draw, and to maintain line patency      Vital Signs Last 24 Hrs  T(C): 36.2 (19 Apr 2022 15:28), Max: 36.4 (19 Apr 2022 12:20)  T(F): 97.2 (19 Apr 2022 15:28), Max: 97.5 (19 Apr 2022 12:20)  HR: 80 (19 Apr 2022 17:43) (78 - 123)  BP: 74/52 (19 Apr 2022 14:00) (74/52 - 145/65)  BP(mean): 59 (19 Apr 2022 14:00) (59 - 106)  RR: 30 (19 Apr 2022 17:30) (15 - 33)  SpO2: 100% (19 Apr 2022 16:45) (69% - 100%)    PHYSICAL EXAM:  HEENT: NC/AT. ET in place.  Neck: Soft  Lungs: Coarse Bs bilaterally; no wheezing.   Heart: RRR, no murmurs.   Abdomen: Soft, obese.   Genital/ Rectal: Zapata with estelle urine.   Extremities: Cold lower extremities.   Neurologic: Sedated.     I&O's Summary    19 Apr 2022 07:01  -  19 Apr 2022 18:37  --------------------------------------------------------  IN: 2113.6 mL / OUT: 0 mL / NET: 2113.6 mL        LABORATORY:                          14.1   24.42 )-----------( 349      ( 19 Apr 2022 09:37 )             43.2           04-19    138  |  105  |  26<H>  ----------------------------<  199<H>  5.1   |  16<L>  |  2.10<H>    Ca    9.2      19 Apr 2022 09:37    TPro  8.1  /  Alb  3.1<L>  /  TBili  1.2  /  DBili  x   /  AST  104<H>  /  ALT  52  /  AlkPhos  56  04-19          LABORATORY:    CBC Full  -  ( 19 Apr 2022 09:37 )  WBC Count : 24.42 K/uL  RBC Count : 4.61 M/uL  Hemoglobin : 14.1 g/dL  Hematocrit : 43.2 %  Platelet Count - Automated : 349 K/uL  Mean Cell Volume : 93.7 fl  Mean Cell Hemoglobin : 30.6 pg  Mean Cell Hemoglobin Concentration : 32.6 gm/dL  Auto Neutrophil # : 21.19 K/uL  Auto Lymphocyte # : 1.75 K/uL  Auto Monocyte # : 1.10 K/uL  Auto Eosinophil # : 0.03 K/uL  Auto Basophil # : 0.07 K/uL  Auto Neutrophil % : 86.8 %  Auto Lymphocyte % : 7.2 %  Auto Monocyte % : 4.5 %  Auto Eosinophil % : 0.1 %  Auto Basophil % : 0.3 %          04-19    138  |  105  |  26<H>  ----------------------------<  199<H>  5.1   |  16<L>  |  2.10<H>    Ca    9.2      19 Apr 2022 09:37    TPro  8.1  /  Alb  3.1<L>  /  TBili  1.2  /  DBili  x   /  AST  104<H>  /  ALT  52  /  AlkPhos  56  04-19      Assessment and Plan:    1. Leukocytosis, r/o sepsis.  2. Shock: sepsis vs cardiogenic.  3. Respiratory failure with hypoxia.  4. NSTEMI.  5. Metabolic acidosis.  6. LORENZO.      . Unclear sources of sepsis and shock. Suspect UTI.  . Would suggest changing Zosyn to IV Merrem 1 gm q12h to provide a broad coverage including ESBL UTI.  . Monitor urine output.  . Follow cultures. Get PCT.  . IV Heparin and ASA as per ICU and cardiology.      Thank you.                                            Patient is a 89y old  Female who presents with a chief complaint of unresponsiveness (19 Apr 2022 15:53)         The patient is a 89 yo Female ,[facility] resident with a PMH  of dementia, HTN, HLD, TIA, GERD and anemia who was brought to [facility] ED today from [facility] where she was found arias. On EMS arrival, patient was unresponsive and hypotensive. In ED patient intubated for acute hypoxic resp failure, resp distress and airway protection. Further work up revealed WBC 24.42, K 5.1, creat 2.1, lactate 9, , trop 4521, ABG post intubation 7.15/37/249/13. CT C/A/P showing Bilateral small pleural effusions and interstitial edema, Increased density right breast with architectural distortion and calcification, Nonobstructive left nephrolithiasis and Nonspecific mesenteric panniculitis. CXR showing b/l infiltrates R>L. In ED patient given 1850 IVF per sepsis protocol and zosyn. Patient started on bicarb infusion d/t acidosis. Patient with continued hypotension, started on levophed. ICU consulted for further management and care.Upon assessment patient is intubated, sedated and unresponsive.Patient RSR in 90's, hypotensive with SBP 80.Patient being admitted to ICU.Events last 24 hours: Arrived from facility unresponsive, intubated in ED. Found to be in septic shock requiring vasopressors. Intubated. She was admitted to ICU. Zapata was placed with no urine output.         PAST MEDICAL & SURGICAL HISTORY:  TIA (transient ischemic attack)    Anemia    Weakness    Cognitive deficits    HTN (hypertension)    Depression    HLD (hyperlipidemia)    Constipation    Dementia    GERD (gastroesophageal reflux disease)    HLD (hyperlipidemia)    Nephrolithiasis    Chronic diastolic congestive heart failure    History of hysterectomy        Allergies    No Known Allergies    Intolerances        REVIEW OF SYSTEMS:  Not able to obtain.    HOME MEDICATIONS:    MEDICATIONS  (STANDING):  aspirin  chewable 81 milliGRAM(s) Oral daily  chlorhexidine 0.12% Liquid 15 milliLiter(s) Oral Mucosa every 12 hours  chlorhexidine 2% Cloths 1 Application(s) Topical <User Schedule>  dexMEDEtomidine Infusion 0.3 MICROgram(s)/kG/Hr (4.43 mL/Hr) IV Continuous <Continuous>  DOBUTamine Infusion 10 MICROgram(s)/kG/Min (18.6 mL/Hr) IV Continuous <Continuous>  hydrocortisone sodium succinate Injectable 50 milliGRAM(s) IV Push every 6 hours  norepinephrine Infusion 0.05 MICROgram(s)/kG/Min (5.53 mL/Hr) IV Continuous <Continuous>  pantoprazole  Injectable 40 milliGRAM(s) IV Push daily  piperacillin/tazobactam IVPB.. 3.375 Gram(s) IV Intermittent every 12 hours  simvastatin 40 milliGRAM(s) Oral at bedtime  sodium bicarbonate  Infusion 0.127 mEq/kG/Hr (150 mL/Hr) IV Continuous <Continuous>  vasopressin Infusion 0.04 Unit(s)/Min (2.4 mL/Hr) IV Continuous <Continuous>    MEDICATIONS  (PRN):  fentaNYL    Injectable 25 MICROGram(s) IV Push every 4 hours PRN Agitation  sodium chloride 0.9% lock flush 10 milliLiter(s) IV Push every 1 hour PRN Pre/post blood products, medications, blood draw, and to maintain line patency      Vital Signs Last 24 Hrs  T(C): 36.2 (19 Apr 2022 15:28), Max: 36.4 (19 Apr 2022 12:20)  T(F): 97.2 (19 Apr 2022 15:28), Max: 97.5 (19 Apr 2022 12:20)  HR: 80 (19 Apr 2022 17:43) (78 - 123)  BP: 74/52 (19 Apr 2022 14:00) (74/52 - 145/65)  BP(mean): 59 (19 Apr 2022 14:00) (59 - 106)  RR: 30 (19 Apr 2022 17:30) (15 - 33)  SpO2: 100% (19 Apr 2022 16:45) (69% - 100%)    PHYSICAL EXAM:  HEENT: NC/AT. ET in place.  Neck: Soft  Lungs: Coarse Bs bilaterally; no wheezing.   Heart: RRR, no murmurs.   Abdomen: Soft, obese.   Genital/ Rectal: Zapata with estelle urine.   Extremities: Cold lower extremities.   Neurologic: Sedated.     I&O's Summary    19 Apr 2022 07:01  -  19 Apr 2022 18:37  --------------------------------------------------------  IN: 2113.6 mL / OUT: 0 mL / NET: 2113.6 mL        LABORATORY:                          14.1   24.42 )-----------( 349      ( 19 Apr 2022 09:37 )             43.2           04-19    138  |  105  |  26<H>  ----------------------------<  199<H>  5.1   |  16<L>  |  2.10<H>    Ca    9.2      19 Apr 2022 09:37    TPro  8.1  /  Alb  3.1<L>  /  TBili  1.2  /  DBili  x   /  AST  104<H>  /  ALT  52  /  AlkPhos  56  04-19          LABORATORY:    CBC Full  -  ( 19 Apr 2022 09:37 )  WBC Count : 24.42 K/uL  RBC Count : 4.61 M/uL  Hemoglobin : 14.1 g/dL  Hematocrit : 43.2 %  Platelet Count - Automated : 349 K/uL  Mean Cell Volume : 93.7 fl  Mean Cell Hemoglobin : 30.6 pg  Mean Cell Hemoglobin Concentration : 32.6 gm/dL  Auto Neutrophil # : 21.19 K/uL  Auto Lymphocyte # : 1.75 K/uL  Auto Monocyte # : 1.10 K/uL  Auto Eosinophil # : 0.03 K/uL  Auto Basophil # : 0.07 K/uL  Auto Neutrophil % : 86.8 %  Auto Lymphocyte % : 7.2 %  Auto Monocyte % : 4.5 %  Auto Eosinophil % : 0.1 %  Auto Basophil % : 0.3 %          04-19    138  |  105  |  26<H>  ----------------------------<  199<H>  5.1   |  16<L>  |  2.10<H>    Ca    9.2      19 Apr 2022 09:37    TPro  8.1  /  Alb  3.1<L>  /  TBili  1.2  /  DBili  x   /  AST  104<H>  /  ALT  52  /  AlkPhos  56  04-19      Assessment and Plan:    1. Leukocytosis, r/o sepsis.  2. Shock: sepsis vs cardiogenic.  3. Respiratory failure with hypoxia.  4. NSTEMI.  5. Metabolic acidosis.  6. LORENZO.      . Unclear sources of sepsis and shock. Suspect UTI.  . Would suggest changing Zosyn to IV Merrem 1 gm q12h to provide a broad coverage including ESBL UTI.  . Monitor urine output.  . Follow cultures. Get PCT.  . IV Heparin and ASA as per ICU and cardiology.      Thank you.                                            Patient is a 89y old  Female who presents with a chief complaint of unresponsiveness (19 Apr 2022 15:53)         The patient is a 89 yo Female ,[facility] resident with a PMH  of dementia, HTN, HLD, TIA, GERD and anemia who was brought to [facility] ED today from [facility] where she was found unresponsive. On EMS arrival, patient was unresponsive and hypotensive. In ED patient intubated for acute hypoxic resp failure and airway protection. Her labs revealed WBC 24.42, K 5.1, creat 2.1, lactate 9, , high  trop 4521 and low bicarbonate of 10. ABG post intubation was suggestive of metabolic acidosis and respiratory acidosis: 7.15/37/249/13. CT C/A/P showed moderate bilateral pleural effusions, atelectasis of lower lobes, increased density right breast with architectural distortion and calcification, nonobstructive left nephrolithiasis and nonspecific mesenteric panniculitis. She was admitted to ICU and started on vasopressors for shock and bicarbonate infusion for severe metabolic acidosis. She was given IV Vancomycin and Zosyn in the ED and now she is on IV Zosyn. Garcia was placed but she had no urine output. She has been seen by cardiology for NSTEMI and shock.         PAST MEDICAL & SURGICAL HISTORY:  TIA (transient ischemic attack)    Anemia    Weakness    Cognitive deficits    HTN (hypertension)    Depression    HLD (hyperlipidemia)    Constipation    Dementia    GERD (gastroesophageal reflux disease)    HLD (hyperlipidemia)    Nephrolithiasis    Chronic diastolic congestive heart failure    History of hysterectomy        Allergies    No Known Allergies    Intolerances        REVIEW OF SYSTEMS:  Not able to obtain.    HOME MEDICATIONS:    MEDICATIONS  (STANDING):  aspirin  chewable 81 milliGRAM(s) Oral daily  chlorhexidine 0.12% Liquid 15 milliLiter(s) Oral Mucosa every 12 hours  chlorhexidine 2% Cloths 1 Application(s) Topical <User Schedule>  dexMEDEtomidine Infusion 0.3 MICROgram(s)/kG/Hr (4.43 mL/Hr) IV Continuous <Continuous>  DOBUTamine Infusion 10 MICROgram(s)/kG/Min (18.6 mL/Hr) IV Continuous <Continuous>  hydrocortisone sodium succinate Injectable 50 milliGRAM(s) IV Push every 6 hours  norepinephrine Infusion 0.05 MICROgram(s)/kG/Min (5.53 mL/Hr) IV Continuous <Continuous>  pantoprazole  Injectable 40 milliGRAM(s) IV Push daily  piperacillin/tazobactam IVPB.. 3.375 Gram(s) IV Intermittent every 12 hours  simvastatin 40 milliGRAM(s) Oral at bedtime  sodium bicarbonate  Infusion 0.127 mEq/kG/Hr (150 mL/Hr) IV Continuous <Continuous>  vasopressin Infusion 0.04 Unit(s)/Min (2.4 mL/Hr) IV Continuous <Continuous>    MEDICATIONS  (PRN):  fentaNYL    Injectable 25 MICROGram(s) IV Push every 4 hours PRN Agitation  sodium chloride 0.9% lock flush 10 milliLiter(s) IV Push every 1 hour PRN Pre/post blood products, medications, blood draw, and to maintain line patency      Vital Signs Last 24 Hrs  T(C): 36.2 (19 Apr 2022 15:28), Max: 36.4 (19 Apr 2022 12:20)  T(F): 97.2 (19 Apr 2022 15:28), Max: 97.5 (19 Apr 2022 12:20)  HR: 80 (19 Apr 2022 17:43) (78 - 123)  BP: 74/52 (19 Apr 2022 14:00) (74/52 - 145/65)  BP(mean): 59 (19 Apr 2022 14:00) (59 - 106)  RR: 30 (19 Apr 2022 17:30) (15 - 33)  SpO2: 100% (19 Apr 2022 16:45) (69% - 100%)    PHYSICAL EXAM:  HEENT: NC/AT. ET in place.  Neck: Soft, no swelling.   Lungs: Coarse Bs bilaterally; no wheezing.   Heart: RRR, no murmurs.   Abdomen: Soft, obese.   Genital/ Rectal: Garcia with estelle urine.   Extremities: Cold lower extremities.   Neurologic: Sedated.     I&O's Summary    19 Apr 2022 07:01  -  19 Apr 2022 18:37  --------------------------------------------------------  IN: 2113.6 mL / OUT: 0 mL / NET: 2113.6 mL        LABORATORY:                          14.1   24.42 )-----------( 349      ( 19 Apr 2022 09:37 )             43.2           04-19    138  |  105  |  26<H>  ----------------------------<  199<H>  5.1   |  16<L>  |  2.10<H>    Ca    9.2      19 Apr 2022 09:37    TPro  8.1  /  Alb  3.1<L>  /  TBili  1.2  /  DBili  x   /  AST  104<H>  /  ALT  52  /  AlkPhos  56  04-19          LABORATORY:    CBC Full  -  ( 19 Apr 2022 09:37 )  WBC Count : 24.42 K/uL  RBC Count : 4.61 M/uL  Hemoglobin : 14.1 g/dL  Hematocrit : 43.2 %  Platelet Count - Automated : 349 K/uL  Mean Cell Volume : 93.7 fl  Mean Cell Hemoglobin : 30.6 pg  Mean Cell Hemoglobin Concentration : 32.6 gm/dL  Auto Neutrophil # : 21.19 K/uL  Auto Lymphocyte # : 1.75 K/uL  Auto Monocyte # : 1.10 K/uL  Auto Eosinophil # : 0.03 K/uL  Auto Basophil # : 0.07 K/uL  Auto Neutrophil % : 86.8 %  Auto Lymphocyte % : 7.2 %  Auto Monocyte % : 4.5 %  Auto Eosinophil % : 0.1 %  Auto Basophil % : 0.3 %          04-19    138  |  105  |  26<H>  ----------------------------<  199<H>  5.1   |  16<L>  |  2.10<H>    Ca    9.2      19 Apr 2022 09:37    TPro  8.1  /  Alb  3.1<L>  /  TBili  1.2  /  DBili  x   /  AST  104<H>  /  ALT  52  /  AlkPhos  56  04-19      Assessment and Plan:    1. Leukocytosis, r/o sepsis.  2. Shock: sepsis vs cardiogenic.  3. Respiratory failure with hypoxia.  4. NSTEMI.  5. Metabolic acidosis and respiratory acidosis.   6. LORENZO.      . Unclear sources of sepsis and shock. Suspect UTI. Possible pneumonia/atelectasis of lower lobes.   . Would suggest changing Zosyn to IV Merrem 500 mg q12h to provide a broad coverage including ESBL UTI. Avoid further IV Vancomycin due to LORENZO.   . Monitor urine output from garcia.   . Follow cultures. Get PCT.  . IV Heparin and ASA as per ICU and cardiology for MI.   . Vasopressors and vent support as per ICU.       Thank you.

## 2022-04-19 NOTE — ED PROVIDER NOTE - OBJECTIVE STATEMENT
hx per ems and transfer papers:  pt is a 89 yo f whose pmd is dr Washington has hx of dementia tia, hld htn gerd and was found on am rounds of her dementia care facility to be unresponsive. ems was called pt found to be hypotensive and apparently dehydrated  no reports of trauma or injury no other data available.

## 2022-04-19 NOTE — ED PROVIDER NOTE - CLINICAL SUMMARY MEDICAL DECISION MAKING FREE TEXT BOX
hx per ems and transfer papers:  pt is a 89 yo f whose pmd is dr Washington has hx of dementia tia, hld htn gerd and was found on am rounds of her dementia care facility to be unresponsive. ems was called pt found to be hypotensive and apparently dehydrated  no reports of trauma or injury no other data available.  exam reveals pt in resp distress hypoxia hypotensive on eval  needs emergency intubation for airway protection   ct scan to ro surgical or infectious path of abd chest ct head ro ich  labs ro metabolic physiologic derangement  abg ro acidosis hypercarbia  blood cultures type and screen   ua garcia cath ro uti uro sepsis  presumptive abx zosyn   pressors levophed, precedex for sedation, iv fluid resuscitation vent will manage with abg and pulse ox   pt critically ill  ekg ro arrhythmia acs

## 2022-04-19 NOTE — PROCEDURE NOTE - ADDITIONAL PROCEDURE DETAILS
Patient is a 89 yo female admitted with septic shock, acute hypoxic resp failure, PNA, AMS, acidosis, LORENZO, requiring arterial line placement for continuous BP monitoring for accurate vasopressor titration.  Art line placed under ultrasound.
Patient is a 89 yo female admitted with  - Shock (sepsis vs cardiogenic)  - Acute hypoxic resp failure  - B/L PNA (?asp)  - AMS   - metabolic acidosis   - LORENZO (baseline creat 0.9-1.1)    Requiring cvc placement for vasopressor administration.   CVC placed under ultrasound, vessel visualized, access obtained, wire passed easily and visualized in L IJ, catheter passed and wire removed. All ports aspirated and flushed.

## 2022-04-19 NOTE — H&P ADULT - PROBLEM SELECTOR PLAN 3
Intubated in ED, currently on 14/450/5/60. Titrate fio2 to maintain spo2 >90%. Will decrease TV To 370 for lung protective measures based on IBW, increase rate to 18 for compensation. Repeat ABG ,ICU team is following

## 2022-04-19 NOTE — H&P ADULT - HISTORY OF PRESENT ILLNESS
Patient is a 89 yo Female ,[facility] resident with a pmh of dementia, HTN, HLD, TIA, GERD and anemia who presented to [facility] ED on 04/19/22 from [facility] facility where they were found unresponsive. On EMS arrival, patient was unresponsive and hypotensive. In ED patient intubated for acute hypoxic resp failure, resp distress and airway protection. Further work up revealed WBC 24.42, K 5.1, creat 2.1, lactate 9, , trop 4521, ABG post intubation 7.15/37/249/13. CT C/A/P showing Bilateral small pleural effusions and interstitial edema, Increased density right breast with architectural distortion and calcification, Nonobstructive left nephrolithiasis and Nonspecific mesenteric panniculitis. CXR showing b/l infiltrates R>L. In ED patient given 1850 IVF per sepsis protocol and zosyn. Patient started on bicarb infusion d/t acidosis. Patient with continued hypotension, started on levophed. ICU consulted for further management and care.Upon assessment patient is intubated, sedated and unresponsive.Patient RSR in 90's, hypotensive with SBP 80.Patient being admitted to ICU.Events last 24 hours: Arrived from facility unresponsive, intubated in ED. Found to be in septic shock requiring vasopressors. Intubated. Being admitted to ICU.

## 2022-04-19 NOTE — H&P ADULT - PROBLEM SELECTOR PLAN 6
Troponin is  elevated, likely 2/2 to  demand ischemia from hypotension, will continue to trend. Repeat EKG in am. Will obtain TTE, last known EF in 2020 was 55%. Mild CHF noted on CXR. Cardiology consult is following

## 2022-04-19 NOTE — CONSULT NOTE ADULT - SUBJECTIVE AND OBJECTIVE BOX
TASH SERRANO    [facility] 10A    Allergies    No Known Allergies    Intolerances        PAST MEDICAL & SURGICAL HISTORY:  TIA (transient ischemic attack)    Anemia    Weakness    Cognitive deficits    HTN (hypertension)    Depression    HLD (hyperlipidemia)    Constipation    Dementia    GERD (gastroesophageal reflux disease)    HLD (hyperlipidemia)    Nephrolithiasis    Chronic diastolic congestive heart failure    History of hysterectomy        FAMILY HISTORY:      Home Medications:  amLODIPine: 7.5 milligram(s) orally once a day (19 Apr 2022 13:30)  Aspirin Enteric Coated 81 mg oral delayed release tablet: 1 tab(s) orally once a day (19 Apr 2022 13:30)  omeprazole 20 mg oral delayed release capsule: 1 cap(s) orally once a day (19 Apr 2022 13:30)  Senna 8.6 mg oral tablet: 2 tab(s) orally once a day (at bedtime) (19 Apr 2022 13:30)  simvastatin 40 mg oral tablet: 1 tab(s) orally once a day (at bedtime) (19 Apr 2022 13:30)  Vitamin D3 1000 intl units (25 mcg) oral capsule: 1 cap(s) orally once a day (19 Apr 2022 13:30)      MEDICATIONS  (STANDING):  chlorhexidine 0.12% Liquid 15 milliLiter(s) Oral Mucosa every 12 hours  chlorhexidine 2% Cloths 1 Application(s) Topical <User Schedule>  dexMEDEtomidine Infusion 0.3 MICROgram(s)/kG/Hr (4.43 mL/Hr) IV Continuous <Continuous>  DOBUTamine Infusion 10 MICROgram(s)/kG/Min (18.6 mL/Hr) IV Continuous <Continuous>  heparin   Injectable 5000 Unit(s) SubCutaneous every 8 hours  norepinephrine Infusion 0.05 MICROgram(s)/kG/Min (5.53 mL/Hr) IV Continuous <Continuous>  pantoprazole  Injectable 40 milliGRAM(s) IV Push daily  piperacillin/tazobactam IVPB.. 3.375 Gram(s) IV Intermittent every 12 hours  sodium bicarbonate  Infusion 0.127 mEq/kG/Hr (150 mL/Hr) IV Continuous <Continuous>    MEDICATIONS  (PRN):  fentaNYL    Injectable 25 MICROGram(s) IV Push every 4 hours PRN Agitation  sodium chloride 0.9% lock flush 10 milliLiter(s) IV Push every 1 hour PRN Pre/post blood products, medications, blood draw, and to maintain line patency      Diet, NPO:   Except Medications     Special Instructions for Nursing:  Except Medications (04-19-22 @ 11:04) [Active]          Vital Signs Last 24 Hrs  T(C): 36.2 (19 Apr 2022 15:28), Max: 36.4 (19 Apr 2022 12:20)  T(F): 97.2 (19 Apr 2022 15:28), Max: 97.5 (19 Apr 2022 12:20)  HR: 80 (19 Apr 2022 17:43) (78 - 123)  BP: 74/52 (19 Apr 2022 14:00) (74/52 - 145/65)  BP(mean): 59 (19 Apr 2022 14:00) (59 - 106)  RR: 30 (19 Apr 2022 17:30) (15 - 33)  SpO2: 100% (19 Apr 2022 16:45) (69% - 100%)      04-19-22 @ 07:01  -  04-19-22 @ 18:03  --------------------------------------------------------  IN: 2113.6 mL / OUT: 0 mL / NET: 2113.6 mL        Mode: AC/ CMV (Assist Control/ Continuous Mandatory Ventilation), RR (machine): 30, TV (machine): 450, FiO2: 100, PEEP: 5, ITime: 1, MAP: 12, PIP: 22      LABS:                        14.1   24.42 )-----------( 349      ( 19 Apr 2022 09:37 )             43.2     04-19    138  |  105  |  26<H>  ----------------------------<  199<H>  5.1   |  16<L>  |  2.10<H>    Ca    9.2      19 Apr 2022 09:37    TPro  8.1  /  Alb  3.1<L>  /  TBili  1.2  /  DBili  x   /  AST  104<H>  /  ALT  52  /  AlkPhos  56  04-19          ABG - ( 19 Apr 2022 14:51 )  pH, Arterial: 7.16  pH, Blood: x     /  pCO2: 28    /  pO2: 160   / HCO3: 10    / Base Excess: -18.7 /  SaO2: 99.3                WBC:  WBC Count: 24.42 K/uL (04-19 @ 09:37)      MICROBIOLOGY:  RECENT CULTURES:        CARDIAC MARKERS ( 19 Apr 2022 13:26 )  x     / x     / 989 U/L / x     / 130.9 ng/mL  CARDIAC MARKERS ( 19 Apr 2022 09:40 )  x     / x     / 364 U/L / x     / x                Sodium:  Sodium, Serum: 138 mmol/L (04-19 @ 09:37)      2.10 mg/dL 04-19 @ 09:37      Hemoglobin:  Hemoglobin: 14.1 g/dL (04-19 @ 09:37)      Platelets: Platelet Count - Automated: 349 K/uL (04-19 @ 09:37)      LIVER FUNCTIONS - ( 19 Apr 2022 09:37 )  Alb: 3.1 g/dL / Pro: 8.1 g/dL / ALK PHOS: 56 U/L / ALT: 52 U/L / AST: 104 U/L / GGT: x                 RADIOLOGY & ADDITIONAL STUDIES:      MICROBIOLOGY:  RECENT CULTURES:
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
## 2022-04-19 NOTE — PROVIDER CONTACT NOTE (CRITICAL VALUE NOTIFICATION) - TEST AND RESULT REPORTED:
ABG PH 7.11
AB.15/37/249/12.9/-16.0
lactic 9.0
ABG PH 7.16
CO2 10, Lactate 12.9, Troponin 19290.9
[facility] 4521.3
Lactate : 12.7
ABG
lacatate 12.2, troponin 18,062.2

## 2022-04-19 NOTE — CHART NOTE - NSCHARTNOTEFT_GEN_A_CORE
Pt is a 89 yo woman with Hx dementia, TIA, htn, HLD, resident of dementia facility, who was found by staff unresponsive and hypotensive.  On arrival to the ED she was in respiratory extremis and was emergently intubated.  Workup showed severe lactic acidosis and acidemia, LORENZO, NSTEMI with ST depressions.  Levophed was started for shock, and she received IVF and empiric Abx.  CT c/a/p showed no obvious source of sepsis.      in ICU  gen: elderly frail woman, intubated and sedated, labored respirations on vent  HEENT: PERRL  chest: ? R mastectomy   pulm: CTABL anteriorly  CV: RRR  abd: mild distension, soft, NT  ext: cool distally    POCUS: diffuse B lines, reduced LV function, no pericardial effusion, IVC about 2cm but pt active on vent      all labs, radiology, data reviewed.    89 yo woman with Hx dementia, TIA, ?R breast cancer, htn presenting with shock, acute respiratory failure in setting of severe lactic acidosis, with NSTEMI, LORENZO.  Possible septic v cardiogenic shock.    --sedation with precedex, fentanyl prn  --shock, on moderate dose levophed, trial of dobutamine  NSTEMI, tread EKG and cardiac enzymes  start ASA, statin, AC with heparin gtt  --acute hypoxemic respiratory failure  increase ventilation for acidemia  --LORENZO, garcia placed, remains anuric, suspect ATN  severe persistent lactic acidosis, continue bicarb gtt until pH starts to improve  --keep NPO for now  --unclear source of sepsis, check panCx  s/p Vanc x 1, continue empiric zosyn  --check fs  --ICU NP updated both brothers in detail, pt now DNR  --pt critically ill, CC time 60min

## 2022-04-19 NOTE — CONSULT NOTE ADULT - SUBJECTIVE AND OBJECTIVE BOX
[facility] Consultation    CHIEF COMPLAINT: Patient is a 89y old  Female who presents with a chief complaint of resp failure    HPI:  Patient is a 89 yo female with a pmh of dementia, HTN, HLD, TIA, GERD and anemia who presented to [facility] ED on 04/19/22 from [facility] facility where they were found unresponsive. On EMS arrival, patient was unresponsive and hypotensive. In ED patient intubated for acute hypoxic resp failure, resp distress and airway protection. Further work up revealed WBC 24.42, K 5.1, creat 2.1, lactate 9, , trop 4521, ABG post intubation 7.15/37/249/13. CT C/A/P showing Bilateral small pleural effusions and interstitial edema, Increased density right breast with architectural distortion and calcification, Nonobstructive left nephrolithiasis and Nonspecific mesenteric panniculitis. CXR showing b/l infiltrates R>L. In ED patient given 1850 IVF per sepsis protocol and zosyn. Patient started on bicarb infusion d/t acidosis. Patient with continued hypotension, started on levophed. ICU consulted for further management and care.  Upon assessment patient is intubated, sedated and unresponsive.  Patient RSR in 90's, hypotensive with SBP 80.  Patient being admitted to ICU.    Events last 24 hours: Arrived from facility unresponsive, intubated in ED. Found to be in septic shock requiring vasopressors. Intubated. Being admitted to ICU.         PAST MEDICAL & SURGICAL HISTORY:  TIA (transient ischemic attack)    Anemia    Weakness    Cognitive deficits    HTN (hypertension)    Depression    HLD (hyperlipidemia)    Constipation    Dementia        SOCIAL HISTORY: no tob/etog    FAMILY HISTORY: NC      MEDICATIONS  (STANDING):  chlorhexidine 0.12% Liquid 15 milliLiter(s) Oral Mucosa every 12 hours  chlorhexidine 2% Cloths 1 Application(s) Topical <User Schedule>  dexMEDEtomidine Infusion 0.3 MICROgram(s)/kG/Hr (4.43 mL/Hr) IV Continuous <Continuous>  heparin   Injectable 5000 Unit(s) SubCutaneous every 8 hours  norepinephrine Infusion 0.05 MICROgram(s)/kG/Min (5.53 mL/Hr) IV Continuous <Continuous>  pantoprazole  Injectable 40 milliGRAM(s) IV Push daily  piperacillin/tazobactam IVPB.. 3.375 Gram(s) IV Intermittent every 12 hours  sodium bicarbonate  Infusion 0.085 mEq/kG/Hr (100 mL/Hr) IV Continuous <Continuous>  vancomycin  IVPB 750 milliGRAM(s) IV Intermittent once    MEDICATIONS  (PRN):  fentaNYL    Injectable 25 MICROGram(s) IV Push every 4 hours PRN Agitation      Allergies    No Known Allergies    Intolerances        REVIEW OF SYSTEMS: unobtainable    VITAL SIGNS:   Vital Signs Last 24 Hrs  T(C): 36.3 (19 Apr 2022 08:40), Max: 36.3 (19 Apr 2022 08:40)  T(F): 97.3 (19 Apr 2022 08:40), Max: 97.3 (19 Apr 2022 08:40)  HR: 113 (19 Apr 2022 12:29) (103 - 123)  BP: 145/65 (19 Apr 2022 11:59) (77/37 - 145/65)  BP(mean): 86 (19 Apr 2022 10:26) (86 - 91)  RR: 15 (19 Apr 2022 11:59) (15 - 18)  SpO2: 100% (19 Apr 2022 11:59) (100% - 100%)    PHYSICAL EXAM:    Constitutional: intubated  Eyes:   Pupils round, no lesions  ENMT: ETT  Pulmonary: scattered rhonchi  Cardiovascular: PMI not palpable non-displaced Regular S1 and S2, no murmurs, rubs, gallops or clicks  Gastrointestinal: Bowel Sounds present, soft, nontender.   Lymph: No peripheral edema. No cervical lymphadenopathy.  Neurological: o focal deficits  Skin: No rashes.  No cyanosis.  Psych:  cannot assess    LABS: All Labs Reviewed:                        14.1   24.42 )-----------( 349      ( 19 Apr 2022 09:37 )             43.2     19 Apr 2022 09:37    138    |  105    |  26     ----------------------------<  199    5.1     |  16     |  2.10     Ca    9.2        19 Apr 2022 09:37    TPro  8.1    /  Alb  3.1    /  TBili  1.2    /  DBili  x      /  AST  104    /  ALT  52     /  AlkPhos  56     19 Apr 2022 09:37      CARDIAC MARKERS ( 19 Apr 2022 09:40 )  x     / x     / 364 U/L / x     / x        ECG: SR, diffuse ST depression c/w MI/I    < from: CT Chest No Cont (04.19.22 @ 10:02) >    ACC: [id number] EXAM:  CT ABDOMEN AND PELVIS                        ACC: [id number] EXAM:  CT CHEST                          PROCEDURE DATE:  04/19/2022          INTERPRETATION:  CLINICAL INFORMATION: Respiratory failure, abdominal pain    COMPARISON: None.    CONTRAST/COMPLICATIONS:  IV Contrast: NONE  Oral Contrast: NONE  Complications: None reported at time of study completion    PROCEDURE:  CT of the Chest, Abdomen and Pelvis was performed.  Sagittal and coronal reformats were performed.    FINDINGS:  CHEST:  LUNGS AND LARGE AIRWAYS: Patent central airways. ET tube in situ.   Bilateral layering pleural effusions right larger than left with   bibasilar dependent atelectasis. Prominent subpleural interlobular septa   suggests interstitial edema..  PLEURA: No pleural effusion.  VESSELS: Nonaneurysmal  HEART: Heart size is normal, with coronary artery calcification. No   pericardial effusion.  MEDIASTINUM AND THEO: No lymphadenopathy.  CHEST WALL AND LOWER NECK: Increased density right breast with   architectural distortion and benign calcification. Neoplastic process not   excluded. Correlate with breast imaging..    ABDOMEN AND PELVIS:  LIVER: Within normal limits.  BILE DUCTS: Normal caliber.  GALLBLADDER: Within normal limits.  SPLEEN: Within normal limits.  PANCREAS: Within normal limits.  ADRENALS: Within normal limits.  KIDNEYS/URETERS: Cortical atrophy and scarring. Extensive vascular   calcification. Punctate nonobstructive left renal calculus..    BLADDER: Not adequately distended  REPRODUCTIVE ORGANS: Hysterectomy    BOWEL: No bowel obstruction. Colonic diverticula. No diverticulitis. NG   tube in position. Appendix no appendicitis  PERITONEUM: Hazy density in the mid to lower abdominal mesentery with   subcentimeter lymph nodes (2, 92/93) consistent with nonspecific   mesenteric panniculitis..  VESSELS: Nonaneurysmal.  RETROPERITONEUM/LYMPH NODES: No lymphadenopathy.  ABDOMINAL WALL: Within normal limits.  BONES: Degenerative changes.    IMPRESSION:  Bilateral small pleural effusions and interstitial edema. Consider an   element of mild/early CHF or fluid overload. No focal consolidation.    Increased density right breast with architectural distortion and   calcification. Correlate with dedicated breast imaging.    Nonobstructive left nephrolithiasis.    Nonspecific mesenteric panniculitis.    Colonic diverticulosis without acute diverticulitis.        --- End of Report ---            HUNTER NEWTON MD; Attending Radiologist  This document has been electronically signed. Apr 19 2022 10:29AM    < end of copied text >

## 2022-04-19 NOTE — H&P ADULT - PROBLEM SELECTOR PLAN 4
Intubated in ED, currently on 14/450/5/60. Titrate fio2 to maintain spo2 >90%. Will decrease TV To 370 for lung protective measures based on IBW, increase rate to 18 for compensation. Repeat ABG,,ICU team is following- ABG showing non improved acidosis >  d5w increased  to 150, increase TV and increase RR
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
## 2022-04-19 NOTE — PROVIDER CONTACT NOTE (CRITICAL VALUE NOTIFICATION) - NAME OF MD/NP/PA/DO NOTIFIED:
Nicole
Vivek Owen-[facility]
Nicole
Dr. Nicole CHATTERJEE
Dr. Lyndsey Manning
Dr. Lyndsey Manning
LAVERNEA John Gan
Marcos
ICU CCPA John Ponce

## 2022-04-19 NOTE — ED PROVIDER NOTE - CONSTITUTIONAL, MLM
normal... unresponsive elderly female who is using accessory muscles to breath mottled skin on abdomen and back hypotensive

## 2022-04-19 NOTE — H&P ADULT - NSICDXPASTMEDICALHX_GEN_ALL_CORE_FT
PAST MEDICAL HISTORY:  Anemia     Chronic diastolic congestive heart failure     Cognitive deficits     Constipation     Dementia     Depression     GERD (gastroesophageal reflux disease)     HLD (hyperlipidemia)     HLD (hyperlipidemia)     HTN (hypertension)     Nephrolithiasis     TIA (transient ischemic attack)     Weakness

## 2022-04-19 NOTE — H&P ADULT - ASSESSMENT
Patient is a 89 yo Female ,[facility] resident with a pmh of dementia, HTN, HLD, TIA, GERD and anemia who presented to [facility] ED on 04/19/22 from Dementia facility where they were found unresponsive. On EMS arrival, patient was unresponsive and hypotensive. In ED patient intubated for acute hypoxic resp failure, resp distress and airway protection. Further work up revealed WBC 24.42, K 5.1, creat 2.1, lactate 9, , trop 4521, ABG post intubation 7.15/37/249/13. CT C/A/P showing Bilateral small pleural effusions and interstitial edema, Increased density right breast with architectural distortion and calcification, Nonobstructive left nephrolithiasis and Nonspecific mesenteric panniculitis. CXR showing b/l infiltrates R>L. In ED patient given 1850 IVF per sepsis protocol and zosyn. Patient started on bicarb infusion d/t acidosis. Patient with continued hypotension, started on levophed. ICU consulted for further management and care.Upon assessment patient is intubated, sedated and unresponsive.Patient RSR in 90's, hypotensive with SBP 80.Patient being admitted to ICU.Events last 24 hours: Arrived from facility unresponsive, intubated in ED. Found to be in septic shock requiring vasopressors. Intubated. Being admitted to ICU. Patient is a 89 yo female with a pmh of dementia, HTN, HLD, TIA, GERD and anemia who is being admitted to presented to the ED unresponsive. Patient is being admitted to ICU.Per chart from facility, patient is DNR - though no MOLST noted with patient. Call placed to emergency contact/estrada Black at , no answer, messaged left. ICU team spoke to another son James .  - Shock (sepsis vs cardiogenic)  - Acute hypoxic resp failure  - B/L PNA (?asp)  - AMS   - metabolic acidosis   - LORENZO (baseline creat 0.9-1.1)  - Transaminitis  - Elevate troponin   -  Rhabdomyolysis

## 2022-04-19 NOTE — PROCEDURE NOTE - NSINDICATIONS_GEN_A_CORE
arterial puncture to obtain ABG's/blood sampling/critical patient/monitoring purposes
critical illness/emergency venous access/hypertonic/irritant infusion

## 2022-04-19 NOTE — H&P ADULT - PROBLEM SELECTOR PLAN 5
PNEUMONIA noted on CXR,  aspiration suspected . UA pending, can not r/o urosepsis. Lactate elevated at 9. Repeat post IVF. Given zosyn in ED, will continue empiric coverage with zosyn, will give x1 dose vanco as from community facility. Cultures obtained, continue to trend, adjust abx as cultures and sensitivity result. Trend WBC, pro komal and lactate. Assess for fevers .Pulmonology and ID consults requested

## 2022-04-19 NOTE — ED PROVIDER NOTE - SECONDARY DIAGNOSIS.
Sepsis Pneumonia Acute dehydration Acute respiratory failure with hypoxia STEMI (ST elevation myocardial infarction)

## 2022-04-19 NOTE — ED ADULT NURSE NOTE - NSIMPLEMENTINTERV_GEN_ALL_ED
Implemented All Fall Risk Interventions:  [place] to call system. Call bell, personal items and telephone within reach. Instruct patient to call for assistance. Room bathroom lighting operational. Non-slip footwear when patient is off stretcher. Physically safe environment: no spills, clutter or unnecessary equipment. Stretcher in lowest position, wheels locked, appropriate side rails in place. Provide visual cue, wrist band, yellow gown, etc. Monitor gait and stability. Monitor for mental status changes and reorient to person, place, and time. Review medications for side effects contributing to fall risk. Reinforce activity limits and safety measures with patient and family.

## 2022-04-19 NOTE — H&P ADULT - PROBLEM SELECTOR PLAN 2
PNEUMONIA noted on CXR,  aspiration suspected . UA pending, can not r/o urosepsis. Lactate elevated at 9. Repeat post IVF. Given zosyn in ED, will continue empiric coverage with zosyn, will give x1 dose vanco as from community facility. Cultures obtained, continue to trend, adjust abx as cultures and sensitivity result. Trend WBC, pro komal and lactate. Assess for fevers .Pulmonology and ID consults requested .Currently in shock with hypotension Actively titrating vasopressors to maintain MAP >65 for adequate tissue perfusion.

## 2022-04-19 NOTE — PATIENT PROFILE ADULT - FUNCTIONAL ASSESSMENT - BASIC MOBILITY 3.
----- Message from Nan Garcia P.A.-C. sent at 10/28/2020  1:44 PM PDT -----  Please call patient about their results.     Results showed:Great news your repeat liver enzymes are normal. See you in 6 months!    Thank you,    Nan Garcia PA-C     1 = Total assistance

## 2022-04-19 NOTE — CONSULT NOTE ADULT - SUBJECTIVE AND OBJECTIVE BOX
Patient is a 89y Female whom presented to the hospital with     PAST MEDICAL & SURGICAL HISTORY:  TIA (transient ischemic attack)    Anemia    Weakness    Cognitive deficits    HTN (hypertension)    Depression    HLD (hyperlipidemia)    Constipation    Dementia    GERD (gastroesophageal reflux disease)    HLD (hyperlipidemia)    Nephrolithiasis    Chronic diastolic congestive heart failure    History of hysterectomy        MEDICATIONS  (STANDING):  aspirin  chewable 81 milliGRAM(s) Oral daily  chlorhexidine 0.12% Liquid 15 milliLiter(s) Oral Mucosa every 12 hours  chlorhexidine 2% Cloths 1 Application(s) Topical <User Schedule>  dexMEDEtomidine Infusion 0.3 MICROgram(s)/kG/Hr (4.43 mL/Hr) IV Continuous <Continuous>  DOBUTamine Infusion 10 MICROgram(s)/kG/Min (18.6 mL/Hr) IV Continuous <Continuous>  norepinephrine Infusion 0.05 MICROgram(s)/kG/Min (5.53 mL/Hr) IV Continuous <Continuous>  pantoprazole  Injectable 40 milliGRAM(s) IV Push daily  piperacillin/tazobactam IVPB.. 3.375 Gram(s) IV Intermittent every 12 hours  simvastatin 40 milliGRAM(s) Oral at bedtime  sodium bicarbonate  Infusion 0.127 mEq/kG/Hr (150 mL/Hr) IV Continuous <Continuous>      Allergies    No Known Allergies    Intolerances        SOCIAL HISTORY:  Denies ETOh,Smoking,     FAMILY HISTORY:      REVIEW OF SYSTEMS:    CONSTITUTIONAL: No weakness, fevers or chills  EYES/ENT: No visual changes;  no throat pain   NECK: No pain or stiffness  RESPIRATORY: No cough, wheezing, hemoptysis; No shortness of breath  CARDIOVASCULAR: No chest pain or palpitations  GASTROINTESTINAL: No abdominal or epigastric pain. No nausea, vomiting,     No diarrhea or constipation. No melena   GENITOURINARY: No dysuria, frequency or hematuria  NEUROLOGICAL: No numbness or weakness  SKIN: dry      VITAL:  T(C): , Max: 36.4 (04-19-22 @ 12:20)  T(F): , Max: 97.5 (04-19-22 @ 12:20)  HR: 80 (04-19-22 @ 17:43)  BP: 74/52 (04-19-22 @ 14:00)  BP(mean): 59 (04-19-22 @ 14:00)  RR: 30 (04-19-22 @ 17:30)  SpO2: 100% (04-19-22 @ 16:45)  Wt(kg): --    I and O's:    04-19 @ 07:01  -  04-19 @ 18:31  --------------------------------------------------------  IN: 2113.6 mL / OUT: 0 mL / NET: 2113.6 mL      Height (cm): 157.5 (04-19 @ 12:20)  Weight (kg): 62 (04-19 @ 12:20)  BMI (kg/m2): 25 (04-19 @ 12:20)  BSA (m2): 1.63 (04-19 @ 12:20)    PHYSICAL EXAM:    Constitutional: NAD  HEENT: conjunctive   clear   Neck:  No JVD  Respiratory: CTAB  Cardiovascular: S1 and S2  Gastrointestinal: BS+, soft, NT/ND  Extremities: No peripheral edema  Neurological: A/O x 3, no focal deficits  Psychiatric: Normal mood, normal affect  : No Zapata  Skin: No rashes  Access: Not applicable    LABS:                        14.1   24.42 )-----------( 349      ( 19 Apr 2022 09:37 )             43.2     04-19    138  |  105  |  26<H>  ----------------------------<  199<H>  5.1   |  16<L>  |  2.10<H>    Ca    9.2      19 Apr 2022 09:37    TPro  8.1  /  Alb  3.1<L>  /  TBili  1.2  /  DBili  x   /  AST  104<H>  /  ALT  52  /  AlkPhos  56  04-19      Urine Studies:          RADIOLOGY & ADDITIONAL STUDIES:                   Patient is a 89y Female whom presented to the hospital with ckd and ross     PAST MEDICAL & SURGICAL HISTORY:  TIA (transient ischemic attack)    Anemia    Weakness    Cognitive deficits    HTN (hypertension)    Depression    HLD (hyperlipidemia)    Constipation    Dementia    GERD (gastroesophageal reflux disease)    HLD (hyperlipidemia)    Nephrolithiasis    Chronic diastolic congestive heart failure    History of hysterectomy        MEDICATIONS  (STANDING):  aspirin  chewable 81 milliGRAM(s) Oral daily  chlorhexidine 0.12% Liquid 15 milliLiter(s) Oral Mucosa every 12 hours  chlorhexidine 2% Cloths 1 Application(s) Topical <User Schedule>  dexMEDEtomidine Infusion 0.3 MICROgram(s)/kG/Hr (4.43 mL/Hr) IV Continuous <Continuous>  DOBUTamine Infusion 10 MICROgram(s)/kG/Min (18.6 mL/Hr) IV Continuous <Continuous>  norepinephrine Infusion 0.05 MICROgram(s)/kG/Min (5.53 mL/Hr) IV Continuous <Continuous>  pantoprazole  Injectable 40 milliGRAM(s) IV Push daily  piperacillin/tazobactam IVPB.. 3.375 Gram(s) IV Intermittent every 12 hours  simvastatin 40 milliGRAM(s) Oral at bedtime  sodium bicarbonate  Infusion 0.127 mEq/kG/Hr (150 mL/Hr) IV Continuous <Continuous>      Allergies    No Known Allergies    Intolerances        SOCIAL HISTORY:  Denies ETOh,Smoking,     FAMILY HISTORY:      REVIEW OF SYSTEMS:  unable to obtained a good review system      VITAL:  T(C): , Max: 36.4 (04-19-22 @ 12:20)  T(F): , Max: 97.5 (04-19-22 @ 12:20)  HR: 80 (04-19-22 @ 17:43)  BP: 74/52 (04-19-22 @ 14:00)  BP(mean): 59 (04-19-22 @ 14:00)  RR: 30 (04-19-22 @ 17:30)  SpO2: 100% (04-19-22 @ 16:45)  Wt(kg): --    I and O's:    04-19 @ 07:01  -  04-19 @ 18:31  --------------------------------------------------------  IN: 2113.6 mL / OUT: 0 mL / NET: 2113.6 mL      Height (cm): 157.5 (04-19 @ 12:20)  Weight (kg): 62 (04-19 @ 12:20)  BMI (kg/m2): 25 (04-19 @ 12:20)  BSA (m2): 1.63 (04-19 @ 12:20)    PHYSICAL EXAM:    HEENT: conjunctive   clear   Neck:  No JVD  Respiratory: CTAB  Cardiovascular: S1 and S2  Gastrointestinal: BS+, soft,  Extremities: No peripheral edema  LABS:                        14.1   24.42 )-----------( 349      ( 19 Apr 2022 09:37 )             43.2     04-19    138  |  105  |  26<H>  ----------------------------<  199<H>  5.1   |  16<L>  |  2.10<H>    Ca    9.2      19 Apr 2022 09:37    TPro  8.1  /  Alb  3.1<L>  /  TBili  1.2  /  DBili  x   /  AST  104<H>  /  ALT  52  /  AlkPhos  56  04-19      Urine Studies:          RADIOLOGY & ADDITIONAL STUDIES:        MEDICATIONS  (STANDING):  aspirin  chewable 81 milliGRAM(s) Oral daily  chlorhexidine 0.12% Liquid 15 milliLiter(s) Oral Mucosa every 12 hours  chlorhexidine 2% Cloths 1 Application(s) Topical <User Schedule>  dexMEDEtomidine Infusion 0.3 MICROgram(s)/kG/Hr (4.43 mL/Hr) IV Continuous <Continuous>  dextrose 5%. 1000 milliLiter(s) (50 mL/Hr) IV Continuous <Continuous>  dextrose 5%. 1000 milliLiter(s) (100 mL/Hr) IV Continuous <Continuous>  dextrose 50% Injectable 25 Gram(s) IV Push once  dextrose 50% Injectable 12.5 Gram(s) IV Push once  dextrose 50% Injectable 25 Gram(s) IV Push once  glucagon  Injectable 1 milliGRAM(s) IntraMuscular once  heparin  Infusion.  Unit(s)/Hr (7.5 mL/Hr) IV Continuous <Continuous>  hydrocortisone sodium succinate Injectable 50 milliGRAM(s) IV Push every 6 hours  insulin lispro (ADMELOG) corrective regimen sliding scale   SubCutaneous every 6 hours  meropenem  IVPB      norepinephrine Infusion 0.05 MICROgram(s)/kG/Min (5.53 mL/Hr) IV Continuous <Continuous>  pantoprazole  Injectable 40 milliGRAM(s) IV Push daily  simvastatin 40 milliGRAM(s) Oral at bedtime  vasopressin Infusion 0.04 Unit(s)/Min (2.4 mL/Hr) IV Continuous <Continuous>

## 2022-04-19 NOTE — ED PROVIDER NOTE - CARE PLAN
1 Principal Discharge DX:	Acute respiratory failure with hypoxia  Secondary Diagnosis:	Sepsis  Secondary Diagnosis:	Pneumonia  Secondary Diagnosis:	Acute dehydration  Secondary Diagnosis:	Acute respiratory failure with hypoxia   Principal Discharge DX:	Acute respiratory failure with hypoxia  Secondary Diagnosis:	Sepsis  Secondary Diagnosis:	Pneumonia  Secondary Diagnosis:	Acute dehydration  Secondary Diagnosis:	Acute respiratory failure with hypoxia  Secondary Diagnosis:	STEMI (ST elevation myocardial infarction)

## 2022-04-19 NOTE — ED PROVIDER NOTE - UNABLE TO OBTAIN
Dementia dementia now unresponsive dementia now unresponsive with resp distress Urgent need for Intervention

## 2022-04-19 NOTE — CONSULT NOTE ADULT - ASSESSMENT
Patient is a 89 yo Female ,[facility] resident with a pmh of dementia, HTN, HLD, TIA, GERD and anemia who presented to [facility] ED on 04/19/22 from Dementia facility where they were found unresponsive. On EMS arrival, patient was unresponsive and hypotensive. In ED patient intubated for acute hypoxic resp failure, resp distress and airway protection. Further work up revealed WBC 24.42, K 5.1, creat 2.1, lactate 9, , trop 4521, ABG post intubation 7.15/37/249/13. CT C/A/P showing Bilateral small pleural effusions and interstitial edema, Increased density right breast with architectural distortion and calcification, Nonobstructive left nephrolithiasis and Nonspecific mesenteric panniculitis. CXR showing b/l infiltrates R>L. In ED patient given 1850 IVF per sepsis protocol and zosyn. Patient started on bicarb infusion d/t acidosis. Patient with continued hypotension, started on levophed. ICU consulted for further management and care.Upon assessment patient is intubated, sedated and unresponsive.Patient RSR in 90's, hypotensive with SBP 80.Patient being admitted to ICU.Events last 24 hours: Arrived from facility unresponsive, intubated in ED. Found to be in septic shock requiring vasopressors. Intubated. Being admitted to ICU.  (19 Apr 2022 15:53)      ACUTE RENAL FAILURE:   Serum creatinine is  at     , approximating GFR at   ml/min.   There is no progression . No uremic symptoms  No evidence of anemia .  Fluid status stable.  Will continue to avoid nephrotoxic drugs.  Patient remains asymptomatic.   Continue current therapy.  hold  diuretic.  hold   ACE inhibitor.  hold   ARB.  Additional evaluation:   ECG,    echocardiogram,     CXR,  will obtained recent   renal ultrasound to evalaute kidney size and possible stones ,        Patient is a 89 yo Female ,[facility] resident with a pmh of dementia, HTN, HLD, TIA, GERD and anemia who presented to [facility] ED on 04/19/22 from Dementia facility where they were found unresponsive. On EMS arrival, patient was unresponsive and hypotensive. In ED patient intubated for acute hypoxic resp failure, resp distress and airway protection. Further work up revealed WBC 24.42, K 5.1, creat 2.1, lactate 9, , trop 4521, ABG post intubation 7.15/37/249/13. CT C/A/P showing Bilateral small pleural effusions and interstitial edema, Increased density right breast with architectural distortion and calcification, Nonobstructive left nephrolithiasis and Nonspecific mesenteric panniculitis. CXR showing b/l infiltrates R>L. In ED patient given 1850 IVF per sepsis protocol and zosyn. Patient started on bicarb infusion d/t acidosis. Patient with continued hypotension, started on levophed. ICU consulted for further management and care.Upon assessment patient is intubated, sedated and unresponsive.Patient RSR in 90's, hypotensive with SBP 80.Patient being admitted to ICU.Events last 24 hours: Arrived from facility unresponsive, intubated in ED. Found to be in septic shock requiring vasopressors. Intubated. Being admitted to ICU.  (19 Apr 2022 15:53)      ACUTE RENAL FAILURE:   Serum creatinine is  at 2.1     , approximating GFR at   ml/min.   There is no progression . No uremic symptoms  No evidence of anemia .  Fluid status stable.  Will continue to avoid nephrotoxic drugs.  Patient remains asymptomatic.   Continue current therapy.  hold  diuretic.  hold   ACE inhibitor.  hold   ARB.  Additional evaluation:   ECG,    echocardiogram,     CXR,  will obtained recent   renal ultrasound to evalaute kidney size and possible stones , if cr is no improved     hypotension norepinephrine Infusion 0.05 MICROgram(s)/kG/Min (5.53 mL/Hr) IV Continuous

## 2022-04-19 NOTE — CONSULT NOTE ADULT - ASSESSMENT
Patient admitted with shock and respiratory failure now on pressors and ventilator management in the ICU. She had an elevated white count and appears to be septic, possibly from a urinary infection although she may have a concurrent pneumonia.  Electrocardiography suggests diffuse ischemia/non-ST segment elevation myocardial infarction likely on the basis of severe metabolic demand. It is unlikely to be acute atherothrombosis. His elevated lactic acid levels with significant acidemia She is critically ill    recommend    Continue vent management per ICU    ID evaluation    Follow renal function closely    Check serial cardiac enzymes    Repeat electrocardiogram    Echocardiography    Aspirin if possible    anticoagulation    Pressors as needed    Further management be dependent on her clinical course

## 2022-04-19 NOTE — PATIENT PROFILE ADULT - FALL HARM RISK - HARM RISK INTERVENTIONS

## 2022-04-19 NOTE — H&P ADULT - PROBLEM SELECTOR PLAN 1
2/2 to acute metabolic encephalopathy secondary to sepsis with acute hypoxic respiratory failure and LROENZO ,POA . CTH showing no acute findings ,ruled out for CVA . Sedation ordered as intubated. Will c/w Precedex for sedation.

## 2022-04-20 NOTE — PROGRESS NOTE ADULT - SUBJECTIVE AND OBJECTIVE BOX
Patient is a 89y old  Female who presents with a chief complaint of unresponsiveness (19 Apr 2022 18:36)      BRIEF HOSPITAL COURSE:  89 y/o female pmhx dementia, TIA, HTN presented to ER today unresponsive and hypotensive in respiratory distress. Admitted w/ acute hypoxic respiratory failure, shock, LORENZO, NSTEMI. She was intubated in the ER and started on vasopressors.  Admitted to ICU .     Events last 24 hours: Dual pressor shock on levophed and vasopressin. Intubated, able to titrate down to 40% FiO2. Anuric w/ worsening renal function. Given IV push versed for additional sedation.     PAST MEDICAL & SURGICAL HISTORY:  TIA (transient ischemic attack)    Anemia    Weakness    Cognitive deficits    HTN (hypertension)    Depression    HLD (hyperlipidemia)    Constipation    Dementia    GERD (gastroesophageal reflux disease)    HLD (hyperlipidemia)    Nephrolithiasis    Chronic diastolic congestive heart failure    History of hysterectomy        Review of Systems:  Unable to obtain due to clinical condition     Medications:  meropenem  IVPB      meropenem  IVPB 500 milliGRAM(s) IV Intermittent every 12 hours  norepinephrine Infusion 0.05 MICROgram(s)/kG/Min IV Continuous <Continuous>  dexMEDEtomidine Infusion 0.3 MICROgram(s)/kG/Hr IV Continuous <Continuous>  fentaNYL    Injectable 50 MICROGram(s) IV Push every 2 hours PRN  midazolam Injectable 2 milliGRAM(s) IV Push every 2 hours PRN  aspirin  chewable 81 milliGRAM(s) Oral daily  heparin   Injectable 3800 Unit(s) IV Push every 6 hours PRN  heparin  Infusion.  Unit(s)/Hr IV Continuous <Continuous>  pantoprazole  Injectable 40 milliGRAM(s) IV Push daily  dextrose 50% Injectable 25 Gram(s) IV Push once  dextrose 50% Injectable 12.5 Gram(s) IV Push once  dextrose 50% Injectable 25 Gram(s) IV Push once  dextrose Oral Gel 15 Gram(s) Oral once PRN  glucagon  Injectable 1 milliGRAM(s) IntraMuscular once  hydrocortisone sodium succinate Injectable 50 milliGRAM(s) IV Push every 6 hours  insulin lispro (ADMELOG) corrective regimen sliding scale   SubCutaneous every 6 hours  simvastatin 40 milliGRAM(s) Oral at bedtime  vasopressin Infusion 0.04 Unit(s)/Min IV Continuous <Continuous>  dextrose 5%. 1000 milliLiter(s) IV Continuous <Continuous>  dextrose 5%. 1000 milliLiter(s) IV Continuous <Continuous>  sodium chloride 0.9% lock flush 10 milliLiter(s) IV Push every 1 hour PRN  hlorhexidine 0.12% Liquid 15 milliLiter(s) Oral Mucosa every 12 hours  chlorhexidine 2% Cloths 1 Application(s) Topical <User Schedule>        Mode: AC/ CMV (Assist Control/ Continuous Mandatory Ventilation)  RR (machine): 30  TV (machine): 450  FiO2: 40  PEEP: 5  ITime: 1  MAP: 15  PIP: 28      ICU Vital Signs Last 24 Hrs  T(C): 35.3 (20 Apr 2022 00:00), Max: 36.5 (19 Apr 2022 18:00)  T(F): 95.6 (20 Apr 2022 00:00), Max: 97.7 (19 Apr 2022 18:00)  HR: 91 (20 Apr 2022 03:00) (78 - 123)  BP: 74/52 (19 Apr 2022 14:00) (74/52 - 145/65)  BP(mean): 59 (19 Apr 2022 14:00) (59 - 106)  ABP: 93/42 (20 Apr 2022 03:00) (81/40 - 143/68)  ABP(mean): 61 (20 Apr 2022 03:00) (55 - 97)  RR: 10 (20 Apr 2022 03:00) (10 - 40)  SpO2: 98% (20 Apr 2022 03:00) (69% - 100%)      ABG - ( 19 Apr 2022 23:16 )  pH, Arterial: 7.19  pH, Blood: x     /  pCO2: 21    /  pO2: 145   / HCO3: 8     / Base Excess: -20.2 /  SaO2: 99.0                I&O's Detail    19 Apr 2022 07:01  -  20 Apr 2022 03:39  --------------------------------------------------------  IN:    Dexmedetomidine: 124.3 mL    DOBUTamine: 9.3 mL    Heparin Infusion: 52.5 mL    IV PiggyBack: 50 mL    IV PiggyBack: 100 mL    IV PiggyBack: 250 mL    Norepinephrine: 845.1 mL    Sodium Bicarbonate: 600 mL    Sodium Bicarbonate: 300 mL    Sodium Chloride 0.9% Bolus: 1000 mL    Vasopressin: 14.4 mL  Total IN: 3345.6 mL    OUT:    Indwelling Catheter - Urethral (mL): 5 mL    Oral Fluid: 0 mL  Total OUT: 5 mL    Total NET: 3340.6 mL            LABS:                        14.1   24.42 )-----------( 349      ( 19 Apr 2022 09:37 )             43.2     04-19    141  |  107  |  30<H>  ----------------------------<  325<H>  4.5   |  10<LL>  |  2.70<H>    Ca    7.0<L>      19 Apr 2022 19:08  Phos  7.5     04-19  Mg     2.1     04-19    TPro  8.1  /  Alb  3.1<L>  /  TBili  1.2  /  DBili  x   /  AST  104<H>  /  ALT  52  /  AlkPhos  56  04-19      CARDIAC MARKERS ( 19 Apr 2022 19:08 )  x     / x     / 2202 U/L / x     / 348.3 ng/mL  CARDIAC MARKERS ( 19 Apr 2022 13:26 )  x     / x     / 989 U/L / x     / 130.9 ng/mL  CARDIAC MARKERS ( 19 Apr 2022 09:40 )  x     / x     / 364 U/L / x     / x          CAPILLARY BLOOD GLUCOSE      POCT Blood Glucose.: 287 mg/dL (19 Apr 2022 19:12)    PT/INR - ( 19 Apr 2022 19:08 )   PT: 21.1 sec;   INR: 1.79 ratio         PTT - ( 20 Apr 2022 03:16 )  PTT:48.2 sec    CULTURES:      Physical Examination:    General: Intubated. No acute distress    HEENT: Pupils equal, reactive to light.  Symmetric.    PULM: Clear to auscultation bilaterally, no significant sputum production    CVS: Regular rate and rhythm, no murmurs, rubs, or gallops    ABD: Soft, nondistended, nontender, normoactive bowel sounds, no masses    EXT: No edema, nontender    SKIN: Warm and well perfused, no rashes noted.    NEURO:  Not responding to stimuli. Not following commands.       RADIOLOGY: < from: CT Chest No Cont (04.19.22 @ 10:02) >    ACC: [id number] EXAM:  CT ABDOMEN AND PELVIS                        ACC: [id number] EXAM:  CT CHEST                          PROCEDURE DATE:  04/19/2022          INTERPRETATION:  CLINICAL INFORMATION: Respiratory failure, abdominal pain    COMPARISON: None.    CONTRAST/COMPLICATIONS:  IV Contrast: NONE  Oral Contrast: NONE  Complications: None reported at time of study completion    PROCEDURE:  CT of the Chest, Abdomen and Pelvis was performed.  Sagittal and coronal reformats were performed.    FINDINGS:  CHEST:  LUNGS AND LARGE AIRWAYS: Patent central airways. ET tube in situ.   Bilateral layering pleural effusions right larger than left with   bibasilar dependent atelectasis. Prominent subpleural interlobular septa   suggests interstitial edema..  PLEURA: No pleural effusion.  VESSELS: Nonaneurysmal  HEART: Heart size is normal, with coronary artery calcification. No   pericardial effusion.  MEDIASTINUM AND THEO: No lymphadenopathy.  CHEST WALL AND LOWER NECK: Increased density right breast with   architectural distortion and benign calcification. Neoplastic process not   excluded. Correlate with breast imaging..    ABDOMEN AND PELVIS:  LIVER: Within normal limits.  BILE DUCTS: Normal caliber.  GALLBLADDER: Within normal limits.  SPLEEN: Within normal limits.  PANCREAS: Within normal limits.  ADRENALS: Within normal limits.  KIDNEYS/URETERS: Cortical atrophy and scarring. Extensive vascular   calcification. Punctate nonobstructive left renal calculus..    BLADDER: Not adequately distended  REPRODUCTIVE ORGANS: Hysterectomy    BOWEL: No bowel obstruction. Colonic diverticula. No diverticulitis. NG   tube in position. Appendix no appendicitis  PERITONEUM: Hazy density in the mid to lower abdominal mesentery with   subcentimeter lymph nodes (2, 92/93) consistent with nonspecific   mesenteric panniculitis..  VESSELS: Nonaneurysmal.  RETROPERITONEUM/LYMPH NODES: No lymphadenopathy.  ABDOMINAL WALL: Within normal limits.  BONES: Degenerative changes.    IMPRESSION:  Bilateral small pleural effusions and interstitial edema. Consider an   element of mild/early CHF or fluid overload. No focal consolidation.    Increased density right breast with architectural distortion and   calcification. Correlate with dedicated breast imaging.    Nonobstructive left nephrolithiasis.    Nonspecific mesenteric panniculitis.    Colonic diverticulosis without acute diverticulitis.        --- End of Report ---    < end of copied text >      CRITICAL CARE TIME SPENT:  42 min  Evaluating/treating patient, reviewing data/labs/imaging, discussing case with multidisciplinary team, discussing plan/goals of care with patient/family. Non-inclusive of procedure time.

## 2022-04-20 NOTE — DISCHARGE NOTE FOR THE EXPIRED PATIENT - HOSPITAL COURSE
89 y/o female pmhx dementia, TIA, HTN presented to ER today unresponsive and hypotensive in respiratory distress. Patient was urgently intubated in the ER, started on vasopressors.  Increased troponin suspected NSTEMI. CT chest/abd/pelvis w/out obvious source of infection.  Admitted w/ acute hypoxic respiratory failure, shock, LORENZO, NSTEMI. .  Admitted to ICU in dual pressor shock. Worsening LORENZO, becoming anuric. Tonight patient quickly w/ increased pressor requirements followed by going into asystole. Patient was DNR. Son Wayne was called and informed her passing. She was pronounced at 3:37.

## 2022-04-20 NOTE — PROGRESS NOTE ADULT - ASSESSMENT
89 y/o female pmhx dementia, TIA, HTN presented to ER today unresponsive and hypotensive in respiratory distress    Assessment:  1. Acute hypoxic respiratory failure  2. Shock  3. LORENZO  4. Metabolic acidosis  5. NSTEMI  6. Lactic acidosis   7. HTN    Plan:     89 y/o female pmhx dementia, TIA, HTN presented to ER today unresponsive and hypotensive in respiratory distress    Assessment:  1. Acute hypoxic respiratory failure  2. Shock, cardiogenic vs septic.   3. LORENZO  4. Metabolic acidosis  5. NSTEMI  6. Lactic acidosis   7. HTN    Plan:  Neuro: Sedated on Precedex for vent synchrony. PRN fentanyl and versed for agitation. CT head w/out acute pathology   CV: Shock, actively titrating levophed to maintain MAP >65. Fixed dose vasopressin. Did not tolerate dobutamine, has been stopped   - NSTEMI, EKG w/ ST depressions. Rising trops. Started on heparin gtt ACS nomogram. ASA and high intensity statin. TTE pending.  Trend Trops.   Resp: Intubated on AC/VC, actively titrating FiO2 to maintain O2 sat >92%. Increased MV to further correct acidosis. Lung protective ventillation.  Vent bundle in place. Aspiration precautions. Repeat ABG in am.  GI: PPI daily. NPO. CT abd/plevis w/out acute pathology   Renal: LORENZO, anuric. Zapata Catheter inserted. Strict I&Os. Trending BUN/Crt. Avoid nephrotoxins. Nephrology following   - Metabolic acidosis, rising lactate. s/p bicarbonate. Trend BMP  ID: Broad spectrum abx w/ meropenum. Vanc x 1 dose given renal failure. Pan cultures pending. RVP negative  Heme: Full AC w/ heparin gtt. ACS nomogram.     DNR  Poor Prognosis.

## 2022-04-24 LAB
CULTURE RESULTS: SIGNIFICANT CHANGE UP
CULTURE RESULTS: SIGNIFICANT CHANGE UP
SPECIMEN SOURCE: SIGNIFICANT CHANGE UP
SPECIMEN SOURCE: SIGNIFICANT CHANGE UP

## 2022-04-26 PROCEDURE — 83605 ASSAY OF LACTIC ACID: CPT

## 2022-04-26 PROCEDURE — 71045 X-RAY EXAM CHEST 1 VIEW: CPT

## 2022-04-26 PROCEDURE — 82550 ASSAY OF CK (CPK): CPT

## 2022-04-26 PROCEDURE — 82140 ASSAY OF AMMONIA: CPT

## 2022-04-26 PROCEDURE — 94003 VENT MGMT INPAT SUBQ DAY: CPT

## 2022-04-26 PROCEDURE — 84145 PROCALCITONIN (PCT): CPT

## 2022-04-26 PROCEDURE — 99291 CRITICAL CARE FIRST HOUR: CPT | Mod: 25

## 2022-04-26 PROCEDURE — 96374 THER/PROPH/DIAG INJ IV PUSH: CPT

## 2022-04-26 PROCEDURE — 85027 COMPLETE CBC AUTOMATED: CPT

## 2022-04-26 PROCEDURE — 87637 SARSCOV2&INF A&B&RSV AMP PRB: CPT

## 2022-04-26 PROCEDURE — 83036 HEMOGLOBIN GLYCOSYLATED A1C: CPT

## 2022-04-26 PROCEDURE — 82803 BLOOD GASES ANY COMBINATION: CPT

## 2022-04-26 PROCEDURE — 87640 STAPH A DNA AMP PROBE: CPT

## 2022-04-26 PROCEDURE — C8929: CPT

## 2022-04-26 PROCEDURE — 83735 ASSAY OF MAGNESIUM: CPT

## 2022-04-26 PROCEDURE — 36415 COLL VENOUS BLD VENIPUNCTURE: CPT

## 2022-04-26 PROCEDURE — 0225U NFCT DS DNA&RNA 21 SARSCOV2: CPT

## 2022-04-26 PROCEDURE — 31500 INSERT EMERGENCY AIRWAY: CPT

## 2022-04-26 PROCEDURE — 36600 WITHDRAWAL OF ARTERIAL BLOOD: CPT

## 2022-04-26 PROCEDURE — 87641 MR-STAPH DNA AMP PROBE: CPT

## 2022-04-26 PROCEDURE — 82962 GLUCOSE BLOOD TEST: CPT

## 2022-04-26 PROCEDURE — 84100 ASSAY OF PHOSPHORUS: CPT

## 2022-04-26 PROCEDURE — 74176 CT ABD & PELVIS W/O CONTRAST: CPT | Mod: MA

## 2022-04-26 PROCEDURE — 85610 PROTHROMBIN TIME: CPT

## 2022-04-26 PROCEDURE — 94002 VENT MGMT INPAT INIT DAY: CPT

## 2022-04-26 PROCEDURE — 71250 CT THORAX DX C-: CPT | Mod: MA

## 2022-04-26 PROCEDURE — 94760 N-INVAS EAR/PLS OXIMETRY 1: CPT

## 2022-04-26 PROCEDURE — 84443 ASSAY THYROID STIM HORMONE: CPT

## 2022-04-26 PROCEDURE — 94799 UNLISTED PULMONARY SVC/PX: CPT

## 2022-04-26 PROCEDURE — 70450 CT HEAD/BRAIN W/O DYE: CPT | Mod: MA

## 2022-04-26 PROCEDURE — 85730 THROMBOPLASTIN TIME PARTIAL: CPT

## 2022-04-26 PROCEDURE — 93306 TTE W/DOPPLER COMPLETE: CPT

## 2022-04-26 PROCEDURE — 80053 COMPREHEN METABOLIC PANEL: CPT

## 2022-04-26 PROCEDURE — 80048 BASIC METABOLIC PNL TOTAL CA: CPT

## 2022-04-26 PROCEDURE — 82553 CREATINE MB FRACTION: CPT

## 2022-04-26 PROCEDURE — 93005 ELECTROCARDIOGRAM TRACING: CPT

## 2022-04-26 PROCEDURE — 87040 BLOOD CULTURE FOR BACTERIA: CPT

## 2022-04-26 PROCEDURE — 85025 COMPLETE CBC W/AUTO DIFF WBC: CPT

## 2022-04-26 PROCEDURE — 83690 ASSAY OF LIPASE: CPT

## 2022-04-26 PROCEDURE — 84484 ASSAY OF TROPONIN QUANT: CPT

## 2022-04-26 PROCEDURE — 96375 TX/PRO/DX INJ NEW DRUG ADDON: CPT

## 2022-05-11 NOTE — ED ADULT TRIAGE NOTE - CHIEF COMPLAINT QUOTE
pt s/p fall, head injury, unwitnessed, pt poor historian, pr on floor for approx 4 hours, hs of dementia, c/o mild dizziness Contraindicated

## 2022-05-28 NOTE — ED PROVIDER NOTE - NS ED ATTENDING STATEMENT MOD
Called patient and spoke with her as she needed a refill of her thyroid hormone but had not been seen by endocrinology in a long time.  Our medical assistant explained to her the policy.  I was about to give her a 30-day refill but she informed me that she has made an appointment to see a new primary care physician at [facility] and she is undecided if she will let the primary care manage her hypothyroidism or follow-up with endocrinology.  I offered her to give her a refill but she declined at this time.   I have personally performed a face to face diagnostic evaluation on this patient. I have reviewed the ACP note and agree with the history, exam and plan of care, except as noted.

## 2022-06-22 NOTE — ED ADULT NURSE NOTE - TOBACCO USE
No new care gaps identified.  [facility] Embedded Care Gaps. Reference number: [id number]. 6/22/2022   3:56:32 PM CDT   Unknown if ever smoked

## 2022-12-27 NOTE — ED PROVIDER NOTE - NS_EDPROVIDERDISPOUSERTYPE_ED_A_ED
Anticoagulation Progress Note      Comment:  MDINR   She took 5mg last night. I advised her to follow the above dose now.   INR in one week.       Assessment  Yes No   []  [x] Missed doses:   []  [x] Extra doses:   []  [] Significant medication changes (RX, OTC, Herbal):   []  [] High-risk maintenance medications:                []  [] Vitamin K / dietary changes (Vitamin K goal: ___ cups/week):   []  [] Bleeding / bruising:   []  [] Falls / injury:   []  [] Acute illness:    []  [] Alcohol intake:   []  [] Procedures / hospitalization / ER visits:   []  [] Other:    Plan     Anticoagulation Summary  As of 2022    INR goal:  2.5-3.5   TTR:  62.9 % (1.3 y)   INR used for dosin.8 (2022)   Warfarin maintenance plan:  5 mg (5 mg x 1) every Clemens; 7.5 mg (5 mg x 1.5) all other days; Starting 2022   Weekly warfarin total:  50 mg   Plan last modified:  Jayesh Gallardo LPN (2022)   Next INR check:  2023   Target end date:  Indefinite    Indications    Paroxysmal atrial fibrillation (CMS/HCC) [I48.0]  Long term (current) use of anticoagulants (Resolved) [Z79.01]  Dilated cardiomyopathy (CMS/HCC) [I42.0]  Long term (current) use of anticoagulants [Z79.01]             Anticoagulation Episode Summary     INR check location:      Preferred lab:      Send INR reminders to:  [place] [address] 2 FELIX 400 POOL    Comments:  MD INR, call patient every time ..........................INR RANGE 2.5 to 3.0  Fax result of 10/31/22 INR to eye doctor Dr Bales for cataract surgery 22  Dr Bales fax [phone]      Anticoagulation Care Providers     Provider Role Specialty Phone number    Alyson Flowers MD Responsible Internal Medicine Advanced Heart Failure and Transplant Cardiology [phone]                Attending Attestation (For Attendings USE Only)...

## 2023-05-04 NOTE — PROGRESS NOTE ADULT - ATTENDING COMMENTS
Outpatient Occupational Therapy  DAILY TREATMENT     [facility] Occupational Therapy [address]  [address]  [place] [address]  Phone:  [phone]  Fax:  [phone]    Date: 05/04/2023    Patient: Heri Solis  YOB: 1961  MRN: [id number]     Time Calculation  Start time: 0230  Stop time: 0315 Time Calculation (min): 45 minutes         Chief Complaint: Extremity Fracture and Self Care Duties    Visit #: 10    SUBJECTIVE:  I am feeling like my wrist is getting looser.     OBJECTIVE:  Current objective measures:      Right Wrist   Wrist flexion: 75 degrees   Wrist extension: 65 degrees   Wrist pronation: WFL  Wrist supination: 75 degrees   Radial deviation: 20 degrees   Ulnar deviation: 20 degrees      Right Thumb     Normal active range of motion     Right Digits   Normal active range of motion     Passive Range of Motion      Right Wrist   Wrist flexion: 80 degrees   Wrist extension: 75 degrees   Wrist pronation: WFL  Wrist supination: 70 degrees   Radial deviation: 20 degrees   Ulnar deviation: 20 degrees      Strength      Left Wrist/Hand       (2nd hand position)     Trial 1: 34     Right Wrist/Hand       (2nd hand position)     Trial 1: 85     Tests      Left Wrist/Hand   Negative Phalen's sign and Tinel's sign (median nerve).      General Comments      Wrist/Hand Comments  9 hole peg test:  R= 20 seconds  L = 24 seconds         Therapeutic Exercises (CPT 88992):     1. AROM and AAROM of left supination, wrist flexion and extension along with active movements of the hand    2. medium resistance theraputty    3. isolated supination/pronation, 10 x    4. wrist maze, 6 x    5. fine motor manipulation and dexterity    6. 9# digi-flex, 10 x    7. 11# graded clip with 3 point prehension, 10 x    8. velcro roll with cylndrical grasp, 5 reps    Therapeutic Exercise Summary:            Time-based treatments/modalities:  Therapeutic exercise minutes (CPT 02429): 45 minutes         Pain rating before treatment: 1  Pain rating after treatment: 2    ASSESSMENT:   Response to treatment: improved AROM of wrist flexion and extension.   strength remains status quo.  Reporting lower pain levels more frequently than before.     PLAN/RECOMMENDATIONS:   Plan for treatment: therapy treatment to continue next visit.  Planned interventions for next visit: continue with current treatment and therapeutic exercise (CPT 10002)          89 YO F ,[facility] resident was brought to ER with altered mental status and weakness ,found to have probable UTI Admitted for septic workup and evaluation,send blood and urine cx,serial lactate levels,monitor vitals closley,ivfs hydration,monitor urine output and renal profile,iv abx initiated -started on ceftriaxone in ER and ID CONS requested .CT Brain was done and showed Volume loss, microvascular disease, age indeterminate lacunar infarct including  left ventral medial thalamus, MR sensitive for new ischemia. No acute hemorrhage or midline shift. Right ostiomeatal complex obstruction with right frontal, ethmoid and maxillary mucoceles right maxillary bony hyperostosis,, mass effect as polyp or inverting papilloma not excluded,report was discussed with neurologist and no new interventions recommended ,continue home rx .Patient;s past med hx is significant for Anemia  ,Cognitive deficits  ,constipation ,Depression  ,HLD (hyperlipidemia)  ,HTN (hypertension)  ,TIA (transient ischemic attack)  .Found to have BP elevation and cardiology consult requested for comanagement Admitted  to telemetry unit for monitoring , send 3 sets of cardiac enzymes to rule out acute coronary event, obtain ECHO to evaluate LVEF, cardiology consult  ,continue current management, O2 supply, anticoagulation plan as per cardiology consult Palliative care consult requested ,to discuss advance directives and complete MOLST

## 2023-10-31 NOTE — PATIENT PROFILE ADULT - NSPRESCRALCAMT_GEN_A_NUR
Writer called, patient saw message and verbalized understanding.   unable to assess due to cognitive impairement

## 2023-12-07 NOTE — PROCEDURAL SAFETY CHECKLIST WITH OR WITHOUT SEDATION - NSTEAMCONFIRM_GEN_ALL_CORE
If you are a smoker, it is important for your health to stop smoking. Please be aware that second hand smoke is also harmful.
done
done

## 2024-02-03 NOTE — DIETITIAN INITIAL EVALUATION ADULT. - PROBLEM/PLAN-1
Problem: Psychosis  Goal: Will report no hallucinations or delusions  Description: INTERVENTIONS:  1. Administer medication as  ordered  2. Assist with reality testing to support increasing orientation  3. Assess if patient's hallucinations or delusions are encouraging self harm or harm to others and intervene as appropriate  2/3/2024 0945 by Tiffanie Russo, RN  Outcome: Progressing     Problem: Behavior  Goal: Pt/Family maintain appropriate behavior and adhere to behavioral management agreement, if implemented  Description: INTERVENTIONS:  1. Assess patient/family's coping skills and  non-compliant behavior (including use of illegal substances)  2. Notify security of behavior or suspected illegal substances which indicate the need for search of the family and/or belongings  3. Encourage verbalization of thoughts and concerns in a socially appropriate manner  4. Utilize positive, consistent limit setting strategies supporting safety of patient, staff and others  5. Encourage participation in the decision making process about the behavioral management agreement  6. If a visitor's behavior poses a threat to safety call refer to organization policy.  7. Initiate consult with , Psychosocial CNS, Spiritual Care as appropriate  2/3/2024 0945 by Tiffanie Russo RN  Outcome: Progressing    Patient is seen in the day room alert. Patient is pleasant upon approach smiling and laughing. Patient denies any depression or anxiety. Denying homicidal or suicidal ideation. Patient denies any olfactory, visual, tactile disturbances. Patient has been medication compliant and denies any side effects. Patient has been compliant with daily ADLs. Patient reports adequate intake and sleep. Patient was encouraged to attend group and socialize with peers. Patient maintains Q 15 minute safety check.       DISPLAY PLAN FREE TEXT

## 2024-05-17 NOTE — PATIENT PROFILE ADULT - NSPRESCRUSEDDRG_GEN_A_NUR
Quality 226: Preventive Care And Screening: Tobacco Use: Screening And Cessation Intervention: Patient screened for tobacco use and is an ex/non-smoker
Detail Level: Detailed
SHEEBA

## 2025-01-13 NOTE — PROGRESS NOTE ADULT - PROBLEM SELECTOR PLAN 10
Gastrointestinal stress ulcer prophylaxis and DVT prophylaxis administered Use Enhanced Medication Counseling?: No

## 2025-04-28 NOTE — ED ADULT NURSE NOTE - NURSING ED SKIN COLOR
normal for race
Instructions: This plan will send the code FBSE to the PM system.  DO NOT or CHANGE the price.
Price (Do Not Change): 0.00
Detail Level: Simple